# Patient Record
Sex: FEMALE | Race: WHITE | Employment: OTHER | URBAN - METROPOLITAN AREA
[De-identification: names, ages, dates, MRNs, and addresses within clinical notes are randomized per-mention and may not be internally consistent; named-entity substitution may affect disease eponyms.]

---

## 2017-01-09 ENCOUNTER — HOSPITAL ENCOUNTER (OUTPATIENT)
Dept: GENERAL RADIOLOGY | Age: 67
Discharge: HOME | End: 2017-01-09
Payer: MEDICARE

## 2017-01-09 DIAGNOSIS — J90 PLEURAL EFFUSION: ICD-10-CM

## 2017-01-09 PROCEDURE — 71020 XR CHEST PA LAT: CPT

## 2018-03-26 ENCOUNTER — HOSPITAL ENCOUNTER (OUTPATIENT)
Dept: CT IMAGING | Age: 68
Discharge: HOME | End: 2018-03-26
Attending: INTERNAL MEDICINE
Payer: MEDICARE

## 2018-03-26 VITALS — HEIGHT: 65 IN | WEIGHT: 180 LBS | BODY MASS INDEX: 29.99 KG/M2

## 2018-03-26 DIAGNOSIS — Z87.891 HISTORY OF SMOKING 30 OR MORE PACK YEARS: ICD-10-CM

## 2018-03-26 PROCEDURE — G0297 LDCT FOR LUNG CA SCREEN: HCPCS

## 2018-04-09 ENCOUNTER — HOSPITAL ENCOUNTER (OUTPATIENT)
Age: 68
Setting detail: OUTPATIENT SURGERY
Discharge: HOME | End: 2018-04-10
Attending: INTERNAL MEDICINE | Admitting: INTERNAL MEDICINE
Payer: MEDICARE

## 2018-04-09 ENCOUNTER — APPOINTMENT (OUTPATIENT)
Dept: GENERAL RADIOLOGY | Age: 68
End: 2018-04-09
Attending: INTERNAL MEDICINE
Payer: MEDICARE

## 2018-04-09 VITALS
SYSTOLIC BLOOD PRESSURE: 154 MMHG | DIASTOLIC BLOOD PRESSURE: 80 MMHG | TEMPERATURE: 97.8 F | OXYGEN SATURATION: 100 % | WEIGHT: 180 LBS | RESPIRATION RATE: 17 BRPM | HEART RATE: 89 BPM | HEIGHT: 64 IN | BODY MASS INDEX: 30.73 KG/M2

## 2018-04-09 LAB
APPEARANCE FLD: ABNORMAL
BODY FLD TYPE: NORMAL
CHOLEST FLD-MCNC: 151 MG/DL
COLOR FLD: YELLOW
GLUCOSE FLD-MCNC: 6 MG/DL
LDH FLD L TO P-CCNC: 972 U/L
LYMPHOCYTES NFR FLD: 28 %
MONOS+MACROS NFR FLD: 68 %
NEUTROPHILS NFR FLD: 4 %
NUC CELL # FLD: 291 /CU MM
PH FLD: 6.9 [PH]
PROT FLD-MCNC: 6.4 G/DL
RBC # FLD: >100 /CU MM
SPECIMEN SOURCE FLD: ABNORMAL
SPECIMEN SOURCE FLD: NORMAL

## 2018-04-09 PROCEDURE — 87205 SMEAR GRAM STAIN: CPT | Performed by: INTERNAL MEDICINE

## 2018-04-09 PROCEDURE — 82664 ELECTROPHORETIC TEST: CPT | Performed by: INTERNAL MEDICINE

## 2018-04-09 PROCEDURE — 88112 CYTOPATH CELL ENHANCE TECH: CPT | Performed by: INTERNAL MEDICINE

## 2018-04-09 PROCEDURE — 83615 LACTATE (LD) (LDH) ENZYME: CPT | Performed by: INTERNAL MEDICINE

## 2018-04-09 PROCEDURE — 89050 BODY FLUID CELL COUNT: CPT | Performed by: INTERNAL MEDICINE

## 2018-04-09 PROCEDURE — 84157 ASSAY OF PROTEIN OTHER: CPT | Performed by: INTERNAL MEDICINE

## 2018-04-09 PROCEDURE — 88305 TISSUE EXAM BY PATHOLOGIST: CPT | Performed by: INTERNAL MEDICINE

## 2018-04-09 PROCEDURE — 87116 MYCOBACTERIA CULTURE: CPT | Performed by: INTERNAL MEDICINE

## 2018-04-09 PROCEDURE — 84311 SPECTROPHOTOMETRY: CPT | Performed by: INTERNAL MEDICINE

## 2018-04-09 PROCEDURE — 87102 FUNGUS ISOLATION CULTURE: CPT | Performed by: INTERNAL MEDICINE

## 2018-04-09 PROCEDURE — 71045 X-RAY EXAM CHEST 1 VIEW: CPT

## 2018-04-09 PROCEDURE — 82945 GLUCOSE OTHER FLUID: CPT | Performed by: INTERNAL MEDICINE

## 2018-04-09 PROCEDURE — 76040000019: Performed by: INTERNAL MEDICINE

## 2018-04-09 PROCEDURE — 83986 ASSAY PH BODY FLUID NOS: CPT | Performed by: INTERNAL MEDICINE

## 2018-04-09 RX ORDER — FENTANYL CITRATE 50 UG/ML
100 INJECTION, SOLUTION INTRAMUSCULAR; INTRAVENOUS
Status: ACTIVE | OUTPATIENT
Start: 2018-04-09 | End: 2018-04-09

## 2018-04-09 RX ORDER — SODIUM CHLORIDE 0.9 % (FLUSH) 0.9 %
5-10 SYRINGE (ML) INJECTION AS NEEDED
Status: DISCONTINUED | OUTPATIENT
Start: 2018-04-09 | End: 2018-04-11 | Stop reason: HOSPADM

## 2018-04-09 RX ORDER — MIDAZOLAM HYDROCHLORIDE 1 MG/ML
5 INJECTION, SOLUTION INTRAMUSCULAR; INTRAVENOUS
Status: DISCONTINUED | OUTPATIENT
Start: 2018-04-09 | End: 2018-04-11 | Stop reason: HOSPADM

## 2018-04-09 RX ORDER — SODIUM CHLORIDE 0.9 % (FLUSH) 0.9 %
5-10 SYRINGE (ML) INJECTION EVERY 8 HOURS
Status: DISCONTINUED | OUTPATIENT
Start: 2018-04-09 | End: 2018-04-11 | Stop reason: HOSPADM

## 2018-04-09 NOTE — DISCHARGE INSTRUCTIONS
Case Diaz MD  [facility]      Name: Dyan Conley MRN: [id number]   : 1950 Hospital: [address]   Date: 2018  Admit Date: 2018     BRONCHOSCOPY / EBUS & THORACENTESIS DISCHARGE INSTRUCTIONS   Discomfort:  -  Sore throat- throat lozenges or warm salt water gargle  -  Redness at IV site- apply warm compress to area  -  If redness or soreness persists at your IV site - contact your physician  -  Gaseous discomfort- walking, belching will help relieve any discomfort  -  Should not operate a vehicle for at least 12 hours  -  You should not engage in operating any machinery or appliances for rest of today  -  You should not drink alcoholic beverages for at least 12 hours  -  Avoid making any critical decisions for at least 24 hour  -  Blood tinged secretions - this should stop within 2-3 hours    DIET:  -  Nothing by mouth - do not eat or drink for two hours. -  You may resume your regular diet unless otherwise directed    MEDICATIONS:  -  Resume your home medications as outlined below unless otherwise directed. ACTIVITY:  - You may resume your normal daily activities unless otherwise directed  -  It is recommended that you spend the remainder of the day resting.  -  Avoid any strenuous activity. CALL M.D. ANY SIGN OF:  -   Increasing pain, nausea, vomiting  -   New increased bleeding  -   Fever (chills)  -   Pain in chest area  -   Bloody discharge from nose or mouth that persists  -   Shortness of breath  -   Bubbles under the skin or around the collarbone. (These may crackle and pop when you press on them.)  -   Coughing up more than ½ cup of blood. Call [phone] office for the following:  Results of procedure  in  3 days  Additional instructions: If you have not heard the results of your test in 7 days please call the phone number listed above.

## 2018-04-09 NOTE — IP AVS SNAPSHOT
[address] 
[phone] Patient: Alisha Ballesteros MRN: [id number] A check jorgito indicates which time of day the medication should be taken. My Medications CONTINUE taking these medications Instructions Each Dose to Equal  
 Morning Noon Evening Bedtime CENTRUM SILVER PO Your last dose was: Your next dose is: Take 1 Tab by mouth daily. 1 Tab  
    
   
   
   
  
 ibuprofen 600 mg tablet Commonly known as:  MOTRIN Your last dose was: Your next dose is: Take 600 mg by mouth daily. 600 mg  
    
   
   
   
  
 vitamin a-vitamin c-vit e-min tablet Commonly known as:  Alvera Tarrs Your last dose was: Your next dose is: Take 1 Tab by mouth daily. 1 Tab

## 2018-04-09 NOTE — INTERVAL H&P NOTE
H&P Update:  Harsha Bar was seen and examined. History and physical has been reviewed. The patient has been examined.  There have been no significant clinical changes since the completion of the originally dated History and Physical.    Signed By: Lorena Mcdonald MD     April 9, 2018 10:04 AM

## 2018-04-09 NOTE — IP AVS SNAPSHOT
[address] [facility] [address] 
[phone] Patient: Rola Beach MRN: [id number] About your hospitalization You were admitted on:  2018 [facility] You were discharged on:  2018 Why you were hospitalized Your primary diagnosis was:  Not on File Follow-up Information Follow up With Details Comments Contact Info Anuel Pretty MD   [address] 
[phone] Discharge Orders None A check jorgito indicates which time of day the medication should be taken. My Medications CONTINUE taking these medications Instructions Each Dose to Equal  
 Morning Noon Evening Bedtime CENTRUM SILVER PO Your last dose was: Your next dose is: Take 1 Tab by mouth daily. 1 Tab  
    
   
   
   
  
 ibuprofen 600 mg tablet Commonly known as:  MOTRIN Your last dose was: Your next dose is: Take 600 mg by mouth daily. 600 mg  
    
   
   
   
  
 vitamin a-vitamin c-vit e-min tablet Commonly known as:  Abdirizak Shiver Your last dose was: Your next dose is: Take 1 Tab by mouth daily. 1 Tab Discharge Instructions Cheryl Delcid MD 
[facility] Name: Rola Beach MRN: [id number] : 1950 Hospital: [place] [address] Date: 2018  Admit Date: 2018 BRONCHOSCOPY / EBUS & THORACENTESIS DISCHARGE INSTRUCTIONS Discomfort: -  Sore throat- throat lozenges or warm salt water gargle -  Redness at IV site- apply warm compress to area -  If redness or soreness persists at your IV site - contact your physician 
-  Gaseous discomfort- walking, belching will help relieve any discomfort -  Should not operate a vehicle for at least 12 hours -  You should not engage in operating any machinery or appliances for rest of today -  You should not drink alcoholic beverages for at least 12 hours -  Avoid making any critical decisions for at least 24 hour -  Blood tinged secretions  this should stop within 2-3 hours DIET: 
-  Nothing by mouth - do not eat or drink for two hours. -  You may resume your regular diet unless otherwise directed MEDICATIONS: 
-  Resume your home medications as outlined below unless otherwise directed. ACTIVITY: 
- You may resume your normal daily activities unless otherwise directed -  It is recommended that you spend the remainder of the day resting. 
-  Avoid any strenuous activity. CALL M.D. ANY SIGN OF: 
-   Increasing pain, nausea, vomiting 
-   New increased bleeding -   Fever (chills) -   Pain in chest area -   Bloody discharge from nose or mouth that persists -   Shortness of breath -   Bubbles under the skin or around the collarbone. (These may crackle and pop when you press on them.) -   Coughing up more than ½ cup of blood. Call physicians office for the following: 
Results of procedure  in  3 days Additional instructions: If you have not heard the results of your test in 7 days please call the phone number listed above. Introducing [facility] [place] [facility]! Dear Wilhelmenia Mortimer: Thank you for requesting a Rocketmiles account. Our records indicate that you already have an active Rocketmiles account. You can access your account anytime at [email or web address] Did you know that you can access your hospital and ER discharge instructions at any time in Rocketmiles? You can also review all of your test results from your hospital stay or ER visit. Additional Information If you have questions, please visit the Frequently Asked Questions section of the Rocketmiles website at [email or web address] Remember, Rocketmiles is NOT to be used for urgent needs.  For medical emergencies, dial 911. Now available from your iPhone and Android! Introducing Parag Dowd As a [facility] patient, I wanted to make you aware of our electronic visit tool called Parag Dowd. Cat Torrent[employer]/7 allows you to connect within minutes with a medical provider 24 hours a day, seven days a week via a mobile device or tablet or logging into a secure website from your computer. You can access Parag Dowd from anywhere in the [place]. A virtual visit might be right for you when you have a simple condition and feel like you just dont want to get out of bed, or cant get away from work for an appointment, when your regular [facility] provider is not available (evenings, weekends or holidays), or when youre out of town and need minor care. Electronic visits cost only $49 and if the [facility] [employer]/7 provider determines a prescription is needed to treat your condition, one can be electronically transmitted to a nearby pharmacy*. Please take a moment to enroll today if you have not already done so. The enrollment process is free and takes just a few minutes. To enroll, please download the Coferon/Metail neli to your tablet or phone, or visit [email or web address] to enroll on your computer. And, as an [address] [place] [address] patient with a [employer] account, the results of your visits will be scanned into your electronic medical record and your primary care provider will be able to view the scanned results. We urge you to continue to see your regular [facility] provider for your ongoing medical care. And while your primary care provider may not be the one available when you seek a Parag Dowd virtual visit, the peace of mind you get from getting a real diagnosis real time can be priceless. For more information on Parag Dowd, view our Frequently Asked Questions (FAQs) at [email or web address].  
 
Sincerely, 
 
Jocelyn Klein MD 
 Chief Medical Officer Milagro Carrera *:  certain medications cannot be prescribed via Parag Dowd Providers Seen During Your Hospitalization Provider Specialty Primary office phone Delmi Bishop MD Pulmonary Disease [phone] Your Primary Care Physician (PCP) Primary Care Physician Office Phone Office Fax Daphne Christensensang [phone] You are allergic to the following Allergen Reactions Iodine Rash Recent Documentation Height Weight Breastfeeding? BMI OB Status Smoking Status 1.626 m 81.6 kg No 30.9 kg/m2 Postpartum Former Smoker Emergency Contacts Name Discharge Info Relation Home Work Mobile Gopi Hooks DISCHARGE CAREGIVER [3] Spouse [3] [phone] Patient Belongings The following personal items are in your possession at time of discharge: 
  Dental Appliances: None  Visual Aid: None Please provide this summary of care documentation to your next provider. Signatures-by signing, you are acknowledging that this After Visit Summary has been reviewed with you and you have received a copy. Patient Signature:  ____________________________________________________________ Date:  ____________________________________________________________  
  
Areli Knight Provider Signature:  ____________________________________________________________ Date:  ____________________________________________________________

## 2018-04-09 NOTE — H&P
Avila Radha  3/30/2018 8:15 AM  Location: [facility]  Patient #: [id number]  : 1950   / Language: Amarjit Penerendira / Race: White  Female      History of Present Illness Robin Villa; 3/30/2018 9:39 AM)  The patient is a 79year old female who presents with abnormal chest imaging. The patient presents for follow-up. Additional reasons for visit:    Pleural effusion is described as the following:  Reason for encounter: follow-up. Note: Patient here for routine follow-up of pleural effusion    Regarding pleural effusion on the left side: On physical exam you can hear a rub on that side -  she is asymptomatic.  Pleural fluid by report has been reassuring.   fluid is  significantly worse.  .  Cytology reviewed it was exudative.  I offered continued observation versus thoracentesis with a pulse of steroids or thoracic surgical evaluation.  She opts for option #2 so we will set her up for an ultrasound-guided thoracentesis by myself next week with fluid to be sent again.  If it recurs a 3rd-4th time and we would ask thoracic surgical for pleuroscopy.  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 Regarding smoking: She gave up smoking in October and smoked for approximately 40 years 1 pack per day.  Explained risks, benefits and alternatives to lung cancer screening unique to her having rheumatoid arthritis, methotrexate therapy and a pleural effusion. I answered all questions asked of me to apparent satisfaction.  Last CT nonrevealing  2018 with the exception of increasing left effusion.  serial CT monitoring   To continue    Problem List/Past Medical (Ronit Noonan; 3/30/2018 9:04 AM)  REFUSED INFLUENZA VACCINE (Z28.21)    RA (RHEUMATOID ARTHRITIS) (M06.9)   taken off immunologics until pleural disease is sorted out.   PLEURAL EFFUSION, LEFT (J90)   2016: IMPRESSION: Trace left effusion with minimal atelectasis. Areas of scarring right lung. Otherwise unremarkable. HISTORY OF SMOKING 30 OR MORE PACK YEARS (R94.090)    PRIOR METHOTREXATE THERAPY (Z92.21)      Other Problems (Sunday Arguello; 3/30/2018 9:04 AM)  Patient has Advanced Medical Directive    Abnormal chest x-ray    ACUTE BRONCHITIS (J20.9)   Pt seems better since she has had a course of levofloxacin. Allergies (Sunday Arguello; 3/30/2018 9:04 AM)  Iodinated Contrast      Family History (Sunday Arguello; 3/30/2018 9:04 AM)  Other medical problems   Paternal Grandmother. Cancer   Father. Arthritis   Father. Completed Stroke   Father. Social History (Sunday Arguello; 3/30/2018 9:04 AM)  Tobacco use   Former smoker. Past Illicit drug use   Previously used marijuana  Alcohol use   Moderate alcohol use. Exercise   5 x week. Marital status   . SMOKER (Z72.0)   Current some day smoker. Smokes < 1 pack of cigarettes per day . Current Household Members   . Current Occupation   Retired. Medication History (Sunday Arguello; 2/46/9820 5:17 AM)  Folic Acid  (1MG Tablet, one Oral daily) Active. Symphony Double Pumping System  Active. Methotrexate Sodium  (5 Oral weekly) Specific strength unknown - Active. Ibuprofen  (600MG Tablet, Oral) Active. Multivitamin Adult  (Oral) Active. Medications Reconciled     Past Surgical History (Sunday Arguello; 3/30/2018 9:04 AM)  Appendectomy          Review of Systems (Zach Serrano; 03/27/2018 6:04 PM)  General Not Present- Fever, Food allergies, Medication allergies, Night Sweats, Seasonal allergies, Weight Gain and Weight Loss. Skin Not Present- Cyanosis, Jaundice and Rash. HEENT Not Present- Blurred Vision, Deafness, Difficulty swallowing, Double Vision, Ear Discharge, Ear Pain, Eye discharge, Hoarseness, Nasal obstruction, Nose Bleed, Ringing in the Ears, Sinusitis, Sore Throat and Wears glasses/contact lenses.   Respiratory Not Present- Cough, Coughing blood, Daytime sleepiness, Shortness of breath, Snoring and Wheezing. Cardiovascular Not Present- Chest pain, Palpitations and Swelling of Extremities. Gastrointestinal Not Present- Abdominal Pain, Constipation, Diarrhea, Indigestion, Nausea and Vomiting. Female Genitourinary Present- Urinating at Night. Not Present- Blood in Urine, Frequency, Incontinence and Painful Urination. Musculoskeletal Not Present- Joint Pain, Joint Stiffness and Joint Swelling. Neurological Not Present- Headaches, Numbness and Weakness. Psychiatric Not Present- Anxiety, Depression and Hallucinations. Endocrine Not Present- Cold Intolerance, Excessive Thirst and Heat Intolerance. Hematology Not Present- Abnormal Bleeding, Easy Bruising and Swollen glands. Vitals (Moise Grass; 3/30/2018 9:08 AM)  3/30/2018 9:07 AM  Weight: 184 lb   Height: 64.5 in   Body Surface Area: 1.9 m²   Body Mass Index: 31.1 kg/m²    Temp.: 98.3° F    Pulse: 95 (Regular)    Resp. : 14 (Unlabored)    P. OX: 98% (Room air)  BP: 145/85 (Sitting, Left Arm, Standard)              Physical Exam Barney Dy; 3/30/2018 9:39 AM)  Chest and Lung Exam  Constitutional - alert, cooperative, well nourished and no acute distress. Skin - normal moisture and normal warmth. Head and Neck - normocephalic, atraumatic, trachea midline and thyroid normal.  Eyes - pupils equal round and reactive to light, conjunctiva normal and lids normal.  ENMT - external ears have normal appearance, external nose is normal, oral mucosa is moist and oropharynx clear. Chest and Lungs  Auscultation - rales (rub left side), no wheezes and no rhonchi. Palpation - chest wall non-tender. Inspection - normal shape and normal respiratory effort. Cardiovascular  Auscultation - no heart murmurs and rate regular. Inspection - jugular veins nondistended and no lower extremity edema. Abdomen - normal contour and soft. Peripheral Vascular - normal gait and no digital clubbing.   Neuropsychiatric - oriented x 3, appropriate mood and affect and good insight. Lymphatic - no neck lymphadenopathy and no supraclavicular lymphadenopathy. Neurological - extraocular muscles intact and no focal deficits present. DATA - CT scan image and report reviewed. Assessment & Plan Jose Manuel Elmore; 3/30/2018 9:35 AM)  REFUSED INFLUENZA VACCINE (Z28.21)  Current Plans  INFLUENZA IMMUNIZATION WAS NOT ORDERED OR ADMINISTERED FOR REASONS DOCUMENTED BY CLINICIAN ()  PRIOR METHOTREXATE THERAPY (Z92.21)  RA (RHEUMATOID ARTHRITIS) (M06.9) <HCC40>  Story: taken off immunologics until pleural disease is sorted out. HISTORY OF SMOKING 30 OR MORE PACK YEARS (I67.685)  Story: Quit October 2016-greater than 30-pack-year history of smoking    March 2018 IMPRESSION: Benign-appearing lung nodules. No significant change. Limited by  pleural effusion. Current Plans  LOW DOSE CT LUNG SCREENING (92148) (March 2019)  Lung Cancer Screening CT:    1. Age 50-69 years old  2. No signs or symptoms of active lung carcinoma  3. 30 pack year history of smoking (or greater than 30 pack years)  4. Current smoker or quit smoking within the last 15 years  11. Written order from health professional following lung cancer screening counseling that attests to shared decision-making having taken place before their 1st screening CT     I attest the patient meets the above criteria    PLEURAL EFFUSION, LEFT (T34)  Story: March 2018-increasing pleural effusion on the left hand side    December 2016: IMPRESSION: Trace left effusion with minimal atelectasis. Areas of scarring  right lung. Otherwise unremarkable. Current Plans  Pt Education - How to access health information online: discussed with patient and provided information.   THORACENTESIS WITH ULTRASOUND (27668) (left-sided thoracentesis)  Follow up in 3 months or sooner, as needed  Patient to follow up with me  Started PredniSONE 10MG, 1 (one) Tablet as directed, #40, 16 days starting 03/30/2018, No Refill. Local Order: - 40 mg x 4 days 30 mg x 4 days 20 mg x 4 days 10 mg x 4 days  Note: Dr. Abilio Tsai available for immediate supervision.     Medical Decision Making Laurent Alexander; 3/30/2018 9:39 AM)  Amount/complexity of data to be reviewed:  - Order and/or review of radiology test(s)          Signed by Laurent Alexander (3/30/2018 9:40 AM)

## 2018-04-09 NOTE — PROCEDURES
Name: Ce Yu MRN: [id number]   : 1950 Hospital: [address]   Date: 2018  Admit Date: 2018       Procedure:  Left  THORACENTESIS with ultrasound guidance & with catheter    Indication:  Left Pleural effusion    Summary:  Informed consent was obtained and timeout taken. Operative site was prepped and draped in usual fashion for a thoracentesis using aseptic technique. The intercostal space was anesthetized with 1% Lidocaine to achieve adequate anesthesia. A thoracentesis kit was used to perform the procedure. Using the previously marked location as a guide,1% plain lidocaine was infiltrated into the skin and subcutaneous tissue, as well as onto the underlying rib and inter-costal muscles & pleural fluid was aspirated to assure proper location prior to removing the anesthesia needle. An incision was then made, with the supplied scalpel in the usual fashion to facilitate the insertion of the thoracentesis needle. The needle and thoracentesis catheter were inserted over the rib into the pleural space using the needle itself as a trocar. The needle was then removed and the catheter inserted was attached to the supplied tubing  & drainage bag without complication to drain pleural fluid. Approximately 500 mL of turbid MILKY YELLOW ( see picture below  ) fluid was obatined with ease and sent for protein, LDH, glucose, cell count with differential, Gram stain, culture and sensitivity, anaerobic culture, AFB smear and culture, fungal smear and culture, chylomicrons  and cholesterol  and cytology. The procedure was terminated after pleural drainage stopped. Air was aspirated during the procedure & estimated blood loss was Minimal.   A post procedure chest Xray is pending. Results discussed with patient and spouse. Will need surgical Evaluation as outpatient for possible VATS    There was an opportunity for questions and answers.    I answered all questions asked of me to apparent satisfaction. Complications:  None; patient tolerated the procedure well.     Estimated Blood Loss: None    Condition: stable    Pictures:                    Signed By: Leonor Beach MD

## 2018-04-12 LAB
ADENOSINE DEAMINASE PLR-CCNC: 6.6 U/L (ref 0–9.4)
SPECIMEN SOURCE: NORMAL

## 2018-04-13 LAB
BACTERIA SPEC CULT: NORMAL
CHYLOMICRON SCREEN, CHYLMT: NORMAL
GRAM STN SPEC: NORMAL
SERVICE CMNT-IMP: NORMAL
SPECIMEN SOURCE: NORMAL

## 2018-04-17 ENCOUNTER — OFFICE VISIT (OUTPATIENT)
Dept: SURGERY | Age: 68
End: 2018-04-17

## 2018-04-17 ENCOUNTER — HOSPITAL ENCOUNTER (OUTPATIENT)
Dept: MAMMOGRAPHY | Age: 68
Discharge: HOME | End: 2018-04-17
Attending: FAMILY MEDICINE
Payer: MEDICARE

## 2018-04-17 VITALS
WEIGHT: 180 LBS | TEMPERATURE: 98.7 F | HEIGHT: 64 IN | BODY MASS INDEX: 30.73 KG/M2 | DIASTOLIC BLOOD PRESSURE: 82 MMHG | RESPIRATION RATE: 20 BRPM | OXYGEN SATURATION: 98 % | SYSTOLIC BLOOD PRESSURE: 133 MMHG | HEART RATE: 99 BPM

## 2018-04-17 DIAGNOSIS — M06.9 RHEUMATOID ARTHRITIS INVOLVING MULTIPLE SITES, UNSPECIFIED RHEUMATOID FACTOR PRESENCE: ICD-10-CM

## 2018-04-17 DIAGNOSIS — Z12.31 VISIT FOR SCREENING MAMMOGRAM: ICD-10-CM

## 2018-04-17 DIAGNOSIS — J90 EXUDATIVE PLEURAL EFFUSION: Primary | ICD-10-CM

## 2018-04-17 PROCEDURE — 77067 SCR MAMMO BI INCL CAD: CPT

## 2018-04-17 RX ORDER — LEUCOVORIN CALCIUM 5 MG/1
5 TABLET ORAL
Refills: 6 | COMMUNITY
Start: 2018-03-28

## 2018-04-17 RX ORDER — METHOTREXATE 2.5 MG/1
17.5 TABLET ORAL
Refills: 3 | COMMUNITY
Start: 2018-02-09

## 2018-04-17 RX ORDER — PREDNISONE 10 MG/1
10 TABLET ORAL
Refills: 0 | Status: ON HOLD | COMMUNITY
Start: 2018-04-04 | End: 2022-02-08 | Stop reason: SDUPTHER

## 2018-04-17 NOTE — PROGRESS NOTES
Asked to see Mrs. Hooks re: recurrent pleural effusion    Referred by Dr. Camila Ortiz    Mrs. Howard Lucas is a pleasant 80 y/o lady with a past medical history significant for rheumatoid arthritis who was noted to have an incidental pleural effusion on her annual screening LDCT. She was seen by Dr. Antione Hoffman of pulmonary medicine. She had a thoracentesis which revealed a cloudy, exudative effusion. She reports that she is asymptomatic; no dyspnea, no fevers no chest pain. Allergies: Iodine    PMHx: rheumatoid arthritis    PSHx: appendectomy, breast biopsy    SocHx: quit smoking in 2016    FamHx: no history of pulmonary disorders    Meds:  leucovorin calcium (WELLCOVORIN) 5 mg tablet       methotrexate (RHEUMATREX) 2.5 mg tablet      predniSONE (DELTASONE) 10 mg tablet      ibuprofen (MOTRIN) 600 mg tablet      FOLIC ACID/MULTIVIT-MIN/LUTEIN (CENTRUM SILVER PO)      vitamin a-vitamin c-vit e-min (OCUVITE) tablet        ROS:    Constitutional- denies weight loss or gain  Neuro- denies syncope  Optho- no changes in vision  HEENT- denies dysphagia  Resp- denies dyspnea  CV- denies angina  GI- denies abd pain  - no complaints  ID- denies recent fevers  Vasc- denies claudication    Afebrile  P 90  /80    On exam she is seated upright  Alert and oriented  Appears younger than her stated age  Well developed  Normal speech, normal affect  No cervical or supraclavicular lymphadenopathy  Lungs CTA b/l  RRR, no murmurs  Radial pulses palp b/l  Abd SNTND, no organomegaly  LE non edematous    =======================    Pleural Fluid- pH 6.9  Protein 6.4  Glucose 6 neutrophils 4    No chylomicrons    Gram stain neg  AFB neg  Fungal neg    Cytology neg  ==========================    I have personally reviewed her LDCT. She had a moderate left pleural effusion.   No definite pleural thickening or nodules.    =======================    Diagnoses  1- Exudative left pleural effusion    =====================    Mrs. Tremayne Gustafson as an asymptomatic left pleural effusion which is exudative. It does not appear to be an empyema and it does not appear to be a malignant effusion. It could be related to her rheumatoid arthritis. The next step in working this up would be a pleural biopsy. I discussed a possible VATS with her for a pleural biopsy and I discussed long term management of the effusion should it keep coming back or start to bother her. Right now she is not interested in pursuing a pleural biopsy. She states that she has no symptoms and is \"pretty sure its my arthritis\". She plans to have her RA regimen increased. Mrs. Tremayne Gustafson understands the risks associated with not aggressively pursuing a biopsy and is comfortable with that. She will reconsider if the effusion returns or if she becomes dyspneic. Thank you for allowing us to care for Mrs. Tremayne Gustafson. I spent 45 minutes with Mrs. Hooks, greater than 50% of which was spent in counseling and education re: pleural effusions, pleural biopsies and surgery etc.

## 2018-04-17 NOTE — PROGRESS NOTES
1. Have you been to the ER, urgent care clinic since your last visit? Hospitalized since your last visit? No    2. Have you seen or consulted any other health care providers outside of the [address] [place] [address] since your last visit? Include any pap smears or colon screening.  No

## 2018-05-07 LAB
BACTERIA SPEC CULT: NORMAL
SERVICE CMNT-IMP: NORMAL

## 2018-05-23 LAB
ACID FAST STN SPEC: NEGATIVE
MYCOBACTERIUM SPEC QL CULT: NEGATIVE
SPECIMEN PREPARATION: NORMAL
SPECIMEN SOURCE: NORMAL

## 2019-08-08 ENCOUNTER — HOSPITAL ENCOUNTER (OUTPATIENT)
Dept: CT IMAGING | Age: 69
Discharge: HOME | End: 2019-08-08
Attending: INTERNAL MEDICINE
Payer: MEDICARE

## 2019-08-08 VITALS — WEIGHT: 185 LBS | BODY MASS INDEX: 30.82 KG/M2 | HEIGHT: 65 IN

## 2019-08-08 DIAGNOSIS — Z87.891 PERSONAL HISTORY OF TOBACCO USE, PRESENTING HAZARDS TO HEALTH: ICD-10-CM

## 2019-08-08 PROCEDURE — G0297 LDCT FOR LUNG CA SCREEN: HCPCS

## 2020-02-11 ENCOUNTER — HOSPITAL ENCOUNTER (OUTPATIENT)
Dept: CT IMAGING | Age: 70
Discharge: HOME | End: 2020-02-11
Attending: INTERNAL MEDICINE
Payer: MEDICARE

## 2020-02-11 VITALS — HEIGHT: 65 IN | WEIGHT: 189 LBS | BODY MASS INDEX: 31.49 KG/M2

## 2020-02-11 DIAGNOSIS — Z87.891 PERSONAL HISTORY OF NICOTINE DEPENDENCE: ICD-10-CM

## 2020-02-11 PROCEDURE — G0297 LDCT FOR LUNG CA SCREEN: HCPCS

## 2022-01-27 ENCOUNTER — TRANSCRIBE ORDER (OUTPATIENT)
Dept: SCHEDULING | Age: 72
End: 2022-01-27

## 2022-01-27 DIAGNOSIS — Z87.891 PERSONAL HISTORY OF TOBACCO USE, PRESENTING HAZARDS TO HEALTH: Primary | ICD-10-CM

## 2022-01-28 ENCOUNTER — TRANSCRIBE ORDER (OUTPATIENT)
Dept: SCHEDULING | Age: 72
End: 2022-01-28

## 2022-01-28 DIAGNOSIS — Z87.891 PERSONAL HISTORY OF TOBACCO USE, PRESENTING HAZARDS TO HEALTH: Primary | ICD-10-CM

## 2022-02-02 ENCOUNTER — APPOINTMENT (OUTPATIENT)
Dept: CT IMAGING | Age: 72
DRG: 196 | End: 2022-02-02
Attending: INTERNAL MEDICINE
Payer: MEDICARE

## 2022-02-02 ENCOUNTER — HOSPITAL ENCOUNTER (INPATIENT)
Age: 72
LOS: 6 days | Discharge: HOME | DRG: 196 | End: 2022-02-08
Attending: EMERGENCY MEDICINE | Admitting: INTERNAL MEDICINE
Payer: MEDICARE

## 2022-02-02 ENCOUNTER — APPOINTMENT (OUTPATIENT)
Dept: GENERAL RADIOLOGY | Age: 72
DRG: 196 | End: 2022-02-02
Attending: EMERGENCY MEDICINE
Payer: MEDICARE

## 2022-02-02 DIAGNOSIS — R09.02 HYPOXIA: ICD-10-CM

## 2022-02-02 DIAGNOSIS — J18.9 PNEUMONIA OF BOTH LUNGS DUE TO INFECTIOUS ORGANISM, UNSPECIFIED PART OF LUNG: Primary | ICD-10-CM

## 2022-02-02 PROBLEM — J96.90 RESPIRATORY FAILURE (HCC): Status: ACTIVE | Noted: 2022-02-02

## 2022-02-02 LAB
ALBUMIN SERPL-MCNC: 2.5 G/DL (ref 3.5–5)
ALBUMIN/GLOB SERPL: 0.6 {RATIO} (ref 1.1–2.2)
ALP SERPL-CCNC: 66 U/L (ref 45–117)
ALT SERPL-CCNC: 29 U/L (ref 12–78)
ANION GAP SERPL CALC-SCNC: 6 MMOL/L (ref 5–15)
ARTERIAL PATENCY WRIST A: YES
AST SERPL-CCNC: 41 U/L (ref 15–37)
ATRIAL RATE: 101 BPM
BASE EXCESS BLDA CALC-SCNC: 0.8 MMOL/L
BASOPHILS # BLD: 0.1 K/UL (ref 0–0.1)
BASOPHILS NFR BLD: 1 % (ref 0–1)
BDY SITE: NORMAL
BILIRUB SERPL-MCNC: 0.5 MG/DL (ref 0.2–1)
BNP SERPL-MCNC: 245 PG/ML
BUN SERPL-MCNC: 13 MG/DL (ref 6–20)
BUN/CREAT SERPL: 21 (ref 12–20)
CALCIUM SERPL-MCNC: 8.8 MG/DL (ref 8.5–10.1)
CALCULATED P AXIS, ECG09: 42 DEGREES
CALCULATED R AXIS, ECG10: -42 DEGREES
CALCULATED T AXIS, ECG11: 41 DEGREES
CHLORIDE SERPL-SCNC: 96 MMOL/L (ref 97–108)
CO2 SERPL-SCNC: 27 MMOL/L (ref 21–32)
COMMENT, HOLDF: NORMAL
COMMENT, HOLDF: NORMAL
CREAT SERPL-MCNC: 0.61 MG/DL (ref 0.55–1.02)
D DIMER PPP FEU-MCNC: 1.35 MG/L FEU (ref 0–0.65)
DIAGNOSIS, 93000: NORMAL
DIFFERENTIAL METHOD BLD: ABNORMAL
EOSINOPHIL # BLD: 0.2 K/UL (ref 0–0.4)
EOSINOPHIL NFR BLD: 3 % (ref 0–7)
ERYTHROCYTE [DISTWIDTH] IN BLOOD BY AUTOMATED COUNT: 12.2 % (ref 11.5–14.5)
FLUAV RNA SPEC QL NAA+PROBE: NOT DETECTED
FLUBV RNA SPEC QL NAA+PROBE: NOT DETECTED
GAS FLOW.O2 O2 DELIVERY SYS: 4 L/MIN
GLOBULIN SER CALC-MCNC: 4.1 G/DL (ref 2–4)
GLUCOSE SERPL-MCNC: 101 MG/DL (ref 65–100)
HCO3 BLDA-SCNC: 25 MMOL/L (ref 22–26)
HCT VFR BLD AUTO: 38.5 % (ref 35–47)
HGB BLD-MCNC: 13.1 G/DL (ref 11.5–16)
IMM GRANULOCYTES # BLD AUTO: 0 K/UL (ref 0–0.04)
IMM GRANULOCYTES NFR BLD AUTO: 0 % (ref 0–0.5)
LYMPHOCYTES # BLD: 1.5 K/UL (ref 0.8–3.5)
LYMPHOCYTES NFR BLD: 21 % (ref 12–49)
MCH RBC QN AUTO: 33.2 PG (ref 26–34)
MCHC RBC AUTO-ENTMCNC: 34 G/DL (ref 30–36.5)
MCV RBC AUTO: 97.5 FL (ref 80–99)
MONOCYTES # BLD: 0.7 K/UL (ref 0–1)
MONOCYTES NFR BLD: 10 % (ref 5–13)
NEUTS SEG # BLD: 4.6 K/UL (ref 1.8–8)
NEUTS SEG NFR BLD: 65 % (ref 32–75)
NRBC # BLD: 0 K/UL (ref 0–0.01)
NRBC BLD-RTO: 0 PER 100 WBC
OSMOLALITY SERPL: 276 MOSM/KG H2O
P-R INTERVAL, ECG05: 156 MS
PCO2 BLDA: 40 MMHG (ref 35–45)
PH BLDA: 7.42 [PH] (ref 7.35–7.45)
PLATELET # BLD AUTO: 327 K/UL (ref 150–400)
PMV BLD AUTO: 8.8 FL (ref 8.9–12.9)
PO2 BLDA: 82 MMHG (ref 80–100)
POTASSIUM SERPL-SCNC: 3.7 MMOL/L (ref 3.5–5.1)
PROCALCITONIN SERPL-MCNC: <0.05 NG/ML
PROT SERPL-MCNC: 6.6 G/DL (ref 6.4–8.2)
Q-T INTERVAL, ECG07: 328 MS
QRS DURATION, ECG06: 88 MS
QTC CALCULATION (BEZET), ECG08: 425 MS
RBC # BLD AUTO: 3.95 M/UL (ref 3.8–5.2)
SAMPLES BEING HELD,HOLD: NORMAL
SAMPLES BEING HELD,HOLD: NORMAL
SAO2 % BLD: 96 % (ref 92–97)
SAO2% DEVICE SAO2% SENSOR NAME: NORMAL
SARS-COV-2, COV2: NORMAL
SARS-COV-2, COV2: NOT DETECTED
SODIUM SERPL-SCNC: 129 MMOL/L (ref 136–145)
SPECIMEN SITE: NORMAL
UR CULT HOLD, URHOLD: NORMAL
VENTRICULAR RATE, ECG03: 101 BPM
WBC # BLD AUTO: 7.1 K/UL (ref 3.6–11)

## 2022-02-02 PROCEDURE — 74011636637 HC RX REV CODE- 636/637: Performed by: INTERNAL MEDICINE

## 2022-02-02 PROCEDURE — 74011000636 HC RX REV CODE- 636: Performed by: RADIOLOGY

## 2022-02-02 PROCEDURE — 99285 EMERGENCY DEPT VISIT HI MDM: CPT

## 2022-02-02 PROCEDURE — 74011250636 HC RX REV CODE- 250/636: Performed by: INTERNAL MEDICINE

## 2022-02-02 PROCEDURE — 93005 ELECTROCARDIOGRAM TRACING: CPT

## 2022-02-02 PROCEDURE — 85025 COMPLETE CBC W/AUTO DIFF WBC: CPT

## 2022-02-02 PROCEDURE — 36415 COLL VENOUS BLD VENIPUNCTURE: CPT

## 2022-02-02 PROCEDURE — 74011000250 HC RX REV CODE- 250: Performed by: INTERNAL MEDICINE

## 2022-02-02 PROCEDURE — 71275 CT ANGIOGRAPHY CHEST: CPT

## 2022-02-02 PROCEDURE — 84145 PROCALCITONIN (PCT): CPT

## 2022-02-02 PROCEDURE — 87636 SARSCOV2 & INF A&B AMP PRB: CPT

## 2022-02-02 PROCEDURE — 82803 BLOOD GASES ANY COMBINATION: CPT

## 2022-02-02 PROCEDURE — 71046 X-RAY EXAM CHEST 2 VIEWS: CPT

## 2022-02-02 PROCEDURE — 65660000000 HC RM CCU STEPDOWN

## 2022-02-02 PROCEDURE — 36600 WITHDRAWAL OF ARTERIAL BLOOD: CPT

## 2022-02-02 PROCEDURE — 85379 FIBRIN DEGRADATION QUANT: CPT

## 2022-02-02 PROCEDURE — 83880 ASSAY OF NATRIURETIC PEPTIDE: CPT

## 2022-02-02 PROCEDURE — 83930 ASSAY OF BLOOD OSMOLALITY: CPT

## 2022-02-02 PROCEDURE — 80053 COMPREHEN METABOLIC PANEL: CPT

## 2022-02-02 RX ORDER — POLYETHYLENE GLYCOL 3350 17 G/17G
17 POWDER, FOR SOLUTION ORAL DAILY PRN
Status: DISCONTINUED | OUTPATIENT
Start: 2022-02-02 | End: 2022-02-08 | Stop reason: HOSPADM

## 2022-02-02 RX ORDER — TELMISARTAN 80 MG/1
80 TABLET ORAL DAILY
COMMUNITY
Start: 2021-08-02

## 2022-02-02 RX ORDER — ENOXAPARIN SODIUM 100 MG/ML
40 INJECTION SUBCUTANEOUS EVERY 24 HOURS
Status: DISCONTINUED | OUTPATIENT
Start: 2022-02-02 | End: 2022-02-08 | Stop reason: HOSPADM

## 2022-02-02 RX ORDER — ONDANSETRON 2 MG/ML
4 INJECTION INTRAMUSCULAR; INTRAVENOUS
Status: DISCONTINUED | OUTPATIENT
Start: 2022-02-02 | End: 2022-02-08 | Stop reason: HOSPADM

## 2022-02-02 RX ORDER — DIPHENHYDRAMINE HYDROCHLORIDE 50 MG/ML
50 INJECTION, SOLUTION INTRAMUSCULAR; INTRAVENOUS ONCE
Status: COMPLETED | OUTPATIENT
Start: 2022-02-02 | End: 2022-02-02

## 2022-02-02 RX ORDER — PREDNISONE 20 MG/1
40 TABLET ORAL ONCE
Status: COMPLETED | OUTPATIENT
Start: 2022-02-02 | End: 2022-02-02

## 2022-02-02 RX ORDER — SODIUM CHLORIDE 0.9 % (FLUSH) 0.9 %
5-40 SYRINGE (ML) INJECTION AS NEEDED
Status: DISCONTINUED | OUTPATIENT
Start: 2022-02-02 | End: 2022-02-08 | Stop reason: HOSPADM

## 2022-02-02 RX ORDER — SODIUM CHLORIDE 0.9 % (FLUSH) 0.9 %
5-40 SYRINGE (ML) INJECTION EVERY 8 HOURS
Status: DISCONTINUED | OUTPATIENT
Start: 2022-02-02 | End: 2022-02-08 | Stop reason: HOSPADM

## 2022-02-02 RX ORDER — ACETAMINOPHEN 325 MG/1
650 TABLET ORAL
Status: DISCONTINUED | OUTPATIENT
Start: 2022-02-02 | End: 2022-02-08 | Stop reason: HOSPADM

## 2022-02-02 RX ORDER — FUROSEMIDE 10 MG/ML
40 INJECTION INTRAMUSCULAR; INTRAVENOUS ONCE
Status: COMPLETED | OUTPATIENT
Start: 2022-02-02 | End: 2022-02-02

## 2022-02-02 RX ORDER — ACETAMINOPHEN 650 MG/1
650 SUPPOSITORY RECTAL
Status: DISCONTINUED | OUTPATIENT
Start: 2022-02-02 | End: 2022-02-08 | Stop reason: HOSPADM

## 2022-02-02 RX ORDER — ONDANSETRON 4 MG/1
4 TABLET, ORALLY DISINTEGRATING ORAL
Status: DISCONTINUED | OUTPATIENT
Start: 2022-02-02 | End: 2022-02-08 | Stop reason: HOSPADM

## 2022-02-02 RX ADMIN — SODIUM CHLORIDE, PRESERVATIVE FREE 10 ML: 5 INJECTION INTRAVENOUS at 16:09

## 2022-02-02 RX ADMIN — FUROSEMIDE 40 MG: 10 INJECTION, SOLUTION INTRAVENOUS at 17:25

## 2022-02-02 RX ADMIN — IOPAMIDOL 80 ML: 755 INJECTION, SOLUTION INTRAVENOUS at 18:43

## 2022-02-02 RX ADMIN — PREDNISONE 40 MG: 20 TABLET ORAL at 17:25

## 2022-02-02 RX ADMIN — DIPHENHYDRAMINE HYDROCHLORIDE 50 MG: 50 INJECTION INTRAMUSCULAR; INTRAVENOUS at 17:27

## 2022-02-02 NOTE — H&P
[address] [facility] Adult  Hospitalist Group  History and Physical    Date of Service:  2/2/2022  Primary Care Provider: Ashley Davalos MD  Source of information: The patient    Chief Complaint: Shortness of Breath      History of Presenting Illness:   Rita Dexter is a 70 y.o. female with pmh of HTN, Rheumatoid arthritis on MTX, and immunosuppresants presents with 3 days of worsening SOB. Patient was seen by her primary care physician today for complaints of shortness of breath, was noted to be hypoxic to the 80s on room air and sent to the emergency department for further evaluation, and concern for COVID-19. Patient has been vaccinated x3, however notably is immunocompromised with rheumatoid arthritis. Patient denies any fevers at home, she notes that she has been weak, increase shortness of breath. She is unable to barely walk up steps or even walk a few blocks without having to stop and catch her breath. She denied any lower extremity edema, para nocturnal dyspnea, orthopnea. But she does use 2 pillows to sleep at night, however is always done so. She does have a dry non productive cough. Denies any palpitations, chest pain, nausea vomiting or diarrhea. No musculoskeletal pain. Of note she does have a h/o pleural effusion which had to be drained multiple times (and cause was uncertain), she has since always had a \"tiny small one\" which never went away. On arrival to the emergency department patient was found to be desaturating to the 80s on room air, placed on 2 L with improvement. Chest x-ray with bilateral infiltrates. COVID-19 PCR done and negative. Patient referred for admission to the hospitalist.     REVIEW OF SYSTEMS:  A comprehensive review of systems was negative except for that written in the History of Present Illness.      Past Medical History:   Diagnosis Date    HLD (hyperlipidemia)     HTN (hypertension)     Rheumatoid arthritis ([address]       Past Surgical History: Procedure Laterality Date    HX APPENDECTOMY      HX BREAST BIOPSY Left     Surgical BX (Over 20yrs ago) - BENIGN     HX OTHER SURGICAL      Breast Biopsy     Prior to Admission medications    Medication Sig Start Date End Date Taking? Authorizing Provider   leucovorin calcium (WELLCOVORIN) 5 mg tablet TK 1  T  PO  FOR  3  DAYS  BEGINNING  THE  DAY  AFTER  METHOTREXATE  DOSE 3/28/18   Provider, Historical   methotrexate (RHEUMATREX) 2.5 mg tablet TK  5  TS  PO  Q  WEEK  ON  THE  SAME  DAY 2/9/18   Provider, Historical   predniSONE (DELTASONE) 10 mg tablet  4/4/18   Provider, Historical   ibuprofen (MOTRIN) 600 mg tablet Take 600 mg by mouth daily. Provider, Historical   FOLIC ACID/MULTIVIT-MIN/LUTEIN (CENTRUM SILVER PO) Take 1 Tab by mouth daily. Provider, Historical   vitamin a-vitamin c-vit e-min (OCUVITE) tablet Take 1 Tab by mouth daily. Provider, Historical     Allergies   Allergen Reactions    Iodine Rash      FH: sister with graves disease  Social History:  reports that she quit smoking about 5 years ago. Her smoking use included cigarettes. She has a 40.00 pack-year smoking history. She has never used smokeless tobacco. She reports current alcohol use. She reports that she does not use drugs. Family and social history were personally reviewed, all pertinent and relevant details are outlined as above. Objective:     Visit Vitals  BP (!) 168/114   Pulse (!) 111   Temp 99.8 °F (37.7 °C)   Resp 20   Ht 5' 5\" (1.651 m)   Wt 90.7 kg (200 lb)   SpO2 94%   BMI 33.28 kg/m²    O2 Flow Rate (L/min): 4 l/min O2 Device: Nasal cannula    PHYSICAL EXAM:   General: Alert x oriented x 3, awake, mild respiratory distress, resting in bed, pleasant female, appears to be stated age  HEENT: PEERL, EOMI, moist mucus membranes  Neck: + JVD,   Chest: Crackles bilaterally in all lung fields  CVS: Regular, tachycardic, no murmurs/rubs/gallops.   Abd: Soft, non-tender, non-distended, +bowel sounds   Ext: No clubbing, no cyanosis, 1+ edema right greater than left  Neuro/Psych: Pleasant mood and affect, CN 2-12 grossly intact, sensory grossly within normal limit,   Cap refill: Brisk, less than 3 seconds  Pulses: 2+, symmetric in all extremities  Skin: Warm, dry, without rashes or lesions    Data Review: All diagnostic labs and studies have been reviewed. Abnormal Labs Reviewed   METABOLIC PANEL, COMPREHENSIVE - Abnormal; Notable for the following components:       Result Value    Sodium 129 (*)     Chloride 96 (*)     Glucose 101 (*)     BUN/Creatinine ratio 21 (*)     AST (SGOT) 41 (*)     Albumin 2.5 (*)     Globulin 4.1 (*)     A-G Ratio 0.6 (*)     All other components within normal limits   CBC WITH AUTOMATED DIFF - Abnormal; Notable for the following components:    MPV 8.8 (*)     All other components within normal limits       All Micro Results     Procedure Component Value Units Date/Time    COVID-19 WITH INFLUENZA A/B [[id number]] Collected: 02/02/22 1358    Order Status: Completed Specimen: Nasopharyngeal Updated: 02/02/22 1436     SARS-CoV-2 Not detected        Comment: Not Detected results do not preclude SARS-CoV-2 infection and should not be used as the sole basis for patient management decisions. Results must be combined with clinical observations, patient history, and epidemiological information. Influenza A by PCR Not detected        Influenza B by PCR Not detected        Comment: Testing was performed using jenaro Dayana SARS-CoV-2 and Influenza A/B nucleic acid assay. This test is a multiplex Real-Time Reverse Transcriptase Polymerase Chain Reaction (RT-PCR) based in vitro diagnostic test intended for the qualitative detection of nucleic acids from SARS-CoV-2, Influenza A, and Influenza B in nasopharyngeal and nasal swab specimens for use under the FDA's Emergency Use Authorization (EUA) only.        Fact sheet for Patients: FindDrives.pl  Fact sheet for Healthcare Providers: [email or web address]         URINE CULTURE HOLD SAMPLE [[id number]] Collected: 02/02/22 1359    Order Status: Completed Specimen: Serum Updated: 02/02/22 1401     Urine culture hold       Urine on hold in Microbiology dept for 2 days. If unpreserved urine is submitted, it cannot be used for addtional testing after 24 hours, recollection will be required. IMAGING:   XR CHEST PA LAT   Final Result   Bilateral infiltrates are consistent with Covid pneumonia      CTA CHEST W OR W WO CONT    (Results Pending)        ECG/ECHO:    Results for orders placed or performed during the hospital encounter of 02/02/22   EKG, 12 LEAD, INITIAL   Result Value Ref Range    Ventricular Rate 101 BPM    Atrial Rate 101 BPM    P-R Interval 156 ms    QRS Duration 88 ms    Q-T Interval 328 ms    QTC Calculation (Bezet) 425 ms    Calculated P Axis 42 degrees    Calculated R Axis -42 degrees    Calculated T Axis 41 degrees    Diagnosis       Sinus tachycardia  Left axis deviation  Abnormal ECG  No previous ECGs available  Confirmed by Miguelina Paez M.D., hSmuel Stelee ([id number]) on 2/2/2022 3:56:57 PM          Assessment:   Given the patient's current clinical presentation, there is a high level of concern for decompensation if discharged from the emergency department. Complex decision making was performed, which includes reviewing the patient's available past medical records, laboratory results, and imaging studies. Active Problems:    Respiratory failure ([address] (2/2/2022)      Plan:   Acute hypoxic respiratory failure - requiring 2-4L to maintain saturations over 90%  Suspected CHF, pulmonary edema - clnically with mild LE edema, crackles and JVD  -O2 as needed to keep saturations over 90%  -Give 40mg of Lasix now  -Check proBNP, echo  - D dimer elevated, check CTA particularly with sinus tach --> discussed with radiology and premedicate with prednisone and benadryl   - COVID PCR/Influenza negative. Canceled the second COVIDPCR with lab, as ER physician was not aware that COVID/flu is a PCR   - Check ABG  - Follow BMP  - If proBNP and echo negative, can consider ordering viral respiratory panel, given she is immunocompromised (to r/o RSV, etc)    Hyponatremia - suspect hypervolemic  - Monitor BMP  - Check urine sodium, urine osm, serum osm     HTN - on ARB, will resume   RA - on MTX and leukovorin, q8week golimumab, as well as qweek denosumab . Hold for now given unclear if pt has active infection.   - Need to keep in mind at risk for ILD ---> FU CT chest       DIET: ADULT DIET Regular   ISOLATION PRECAUTIONS: There are currently no Active Isolations  CODE STATUS: Full Code   DVT PROPHYLAXIS: Lovenox  FUNCTIONAL STATUS PRIOR TO HOSPITALIZATION: Fully active and ambulatory; able to carry on all self-care without restriction. EARLY MOBILITY ASSESSMENT: Recommend routine ambulation while hospitalized with the assistance of nursing staff  ANTICIPATED DISCHARGE: 24-48 hours. EMERGENCY CONTACT/SURROGATE DECISION MAKER:      CRITICAL CARE WAS PERFORMED FOR THIS ENCOUNTER: NO.      Signed By: Isis Fleming MD     February 2, 2022         Please note that this dictation may have been completed with Dragon, the [employer] voice recognition software. Quite often unanticipated grammatical, syntax, homophones, and other interpretive errors are inadvertently transcribed by the computer software. Please disregard these errors. Please excuse any errors that have escaped final proofreading.

## 2022-02-02 NOTE — ED NOTES
Pt ambulatory to room with steady gait. Pt O2 84% on room air after sitting for a few mins. Placed on 2L NC. O2 brought to 95%.

## 2022-02-02 NOTE — ED NOTES
[address] care of patient from [facility], [address] [place] [address].     1750 - Bedside and Verbal shift change report given to Rubin Marti RN (oncoming nurse) by Vanita Gaytan RN (offgoing nurse). Report included the following information SBAR, ED Summary, Procedure Summary, Intake/Output, MAR, Recent Results and Cardiac Rhythm Sinus tach. Amina Shelton

## 2022-02-02 NOTE — ED PROVIDER NOTES
This is a 79-year-old female with a history of hypertension and rheumatoid arthritis. She presents with an onset of fever, increasing cough and shortness of breath since . She has had a her Covid vaccines. She is taken to home test which are negative for Covid. She was sent here today by her primary physician who saw her in the office and noted that she had a significant oxygen requirement. He sent her here with suspicion of Covid for likely admission. Patient is not complaining of any pain. She has not got any productive cough. She has had no chest pain or abdominal pain and no GI or  symptoms noted. She is more short of breath with activity. Past Medical History:   Diagnosis Date    HLD (hyperlipidemia)     HTN (hypertension)     Rhe[address] [address]        Past Surgical History:   Procedure Laterality Date    HX APPENDECTOMY      HX BREAST BIOPSY Left     Surgical BX (Over 20yrs ago) - BENIGN     HX OTHER SURGICAL      Breast Biopsy         History reviewed. No pertinent family history. Social History     Socioeconomic History    Marital status:      Spouse name: Not on file    Number of children: Not on file    Years of education: Not on file    Highest education level: Not on file   Occupational History    Not on file   Tobacco Use    Smoking status: Former Smoker     Packs/day: 1.00     Years: 40.00     Pack years: 40.00     Types: Cigarettes     Quit date: 10/26/2016     Years since quittin.2    Smokeless tobacco: Never Used   Substance and Sexual Activity    Alcohol use:  Yes    Drug use: No    Sexual activity: Yes     Partners: Male   Other Topics Concern    Not on file   Social History Narrative    Not on file     Social Determinants of Health     Financial Resource Strain:     Difficulty of Paying Living Expenses: Not on file   Food Insecurity:     Worried About [address] in the Last Year: Not on file    [address] in the Last Year: Not on file   Transportation Needs:     Lack of Transportation (Medical): Not on file    Lack of Transportation (Non-Medical): Not on file   Physical Activity:     Days of Exercise per Week: Not on file    Minutes of Exercise per Session: Not on file   Stress:     Feeling of Stress : Not on file   Social Connections:     Frequency of Communication with Friends and Family: Not on file    Frequency of Social Gatherings with Friends and Family: Not on file    Attends Yarsanism Services: Not on file    Active Member of [address] [place] [address] [employer] or Organizations: Not on file    Attends Club or Organization Meetings: Not on file    Marital Status: Not on file   Intimate Partner Violence:     Fear of Current or Ex-Partner: Not on file    Emotionally Abused: Not on file    Physically Abused: Not on file    Sexually Abused: Not on file   Housing Stability:     Unable to Pay for Housing in the Last Year: Not on file    Number of [place] in the Last Year: Not on file    Unstable Housing in the Last Year: Not on file         ALLERGIES: Iodine    Review of Systems    Vitals:    02/02/22 1310 02/02/22 1315 02/02/22 1319 02/02/22 1331   BP: (!) 161/95 (!) 150/88  (!) 149/97   Pulse: (!) 107 (!) 101  (!) 109   Resp: 22 20 22   Temp: 99.1 °F (37.3 °C)      SpO2: (!) 84% 93% 94% 90%   Weight:       Height:                Physical Exam  Vitals and nursing note reviewed. Constitutional:       General: She is in acute distress ( With mild to moderate shortness of breath. ). Appearance: She is well-developed. She is not ill-appearing or diaphoretic. Comments: This is a 70-year-old female who presents with increasing shortness of breath and cough. She has had a fever at home. Cough is been nonproductive. Vital signs are as noted. Patient's oxygen saturation on room air is at 84%. 2 L she is up to 94%. HENT:      Head: Normocephalic and atraumatic.       Nose: Nose normal.   Eyes:      General: No scleral icterus. Conjunctiva/sclera: Conjunctivae normal.      Pupils: Pupils are equal, round, and reactive to light. Neck:      Thyroid: No thyromegaly. Vascular: No JVD. Trachea: No tracheal deviation. Comments: No carotid bruits noted. Cardiovascular:      Rate and Rhythm: Normal rate and regular rhythm. Heart sounds: Normal heart sounds. No murmur heard. No friction rub. No gallop. Pulmonary:      Effort: Pulmonary effort is normal. No respiratory distress. Breath sounds: Normal breath sounds. No wheezing or rales. Chest:      Chest wall: No tenderness. Abdominal:      General: Bowel sounds are normal. There is no distension. Palpations: Abdomen is soft. There is no mass. Tenderness: There is no abdominal tenderness. There is no guarding or rebound. Musculoskeletal:         General: No tenderness. Normal range of motion. Cervical back: Normal range of motion and neck supple. Lymphadenopathy:      Cervical: No cervical adenopathy. Skin:     General: Skin is warm and dry. Capillary Refill: Capillary refill takes 2 to 3 seconds. Findings: No erythema or rash. Neurological:      General: No focal deficit present. Mental Status: She is alert and oriented to person, place, and time. Cranial Nerves: No cranial nerve deficit. Coordination: Coordination normal.      Deep Tendon Reflexes: Reflexes are normal and symmetric. Psychiatric:         Behavior: Behavior normal.         Thought Content:  Thought content normal.         Judgment: Judgment normal.          MDM  Number of Diagnoses or Management Options     Amount and/or Complexity of Data Reviewed  Clinical lab tests: ordered and reviewed  Tests in the radiology section of CPT®: ordered and reviewed  Decide to obtain previous medical records or to obtain history from someone other than the patient: yes  Review and summarize past medical records: yes  Discuss the patient with other providers: yes  Independent visualization of images, tracings, or specimens: yes    Risk of Complications, Morbidity, and/or Mortality  Presenting problems: high  Diagnostic procedures: high  Management options: high    Patient Progress  Patient progress: stable         Procedures  Patient's chest x-ray is consistent with Covid pneumonia. We are doing a rapid and PCR test here with flu as well. Patient is hypoxic on room air and will require admission. The patient's Covid test is negative. A PCR was ordered. Flu swabs were ordered and are negative. The chest x-ray is clearly consistent with Covid pneumonia and her oxygen saturations on room air are down to 84%. Will discuss with the hospitalist for admission for suspected Covid pneumonia and hypoxia. Perfect Serve Consult for Admission  2:47 PM    ED Room Number: ER29/29  Patient Name and age:  Lori Yip 70 y.o.  female  Working Diagnosis:   1. Pneumonia of both lungs due to infectious organism, unspecified part of lung    2.  Hypoxia        COVID-19 Suspicion:  yes  Sepsis present:  no  Reassessment needed: yes  Code Status:  Full Code  Readmission: no  Isolation Requirements:  yes  Recommended Level of Care:  telemetry  Department:[facility] Adult ED - 21   Other:

## 2022-02-02 NOTE — ED TRIAGE NOTES
Sent by MD Jose Jacob for evaluation of hypoxia. C/o SOB for a few days. Has received COVID vaccine and booster. Hypoxic to 89% at MD's office. Patient has hx of RA and is on methotrexate. 92% on RA in triage.

## 2022-02-03 ENCOUNTER — APPOINTMENT (OUTPATIENT)
Dept: NON INVASIVE DIAGNOSTICS | Age: 72
DRG: 196 | End: 2022-02-03
Attending: INTERNAL MEDICINE
Payer: MEDICARE

## 2022-02-03 LAB
ANION GAP SERPL CALC-SCNC: 6 MMOL/L (ref 5–15)
BASOPHILS # BLD: 0 K/UL (ref 0–0.1)
BASOPHILS NFR BLD: 1 % (ref 0–1)
BUN SERPL-MCNC: 10 MG/DL (ref 6–20)
BUN/CREAT SERPL: 16 (ref 12–20)
CALCIUM SERPL-MCNC: 8.5 MG/DL (ref 8.5–10.1)
CHLORIDE SERPL-SCNC: 97 MMOL/L (ref 97–108)
CO2 SERPL-SCNC: 25 MMOL/L (ref 21–32)
CREAT SERPL-MCNC: 0.63 MG/DL (ref 0.55–1.02)
CRP SERPL-MCNC: 13.1 MG/DL (ref 0–0.6)
DIFFERENTIAL METHOD BLD: ABNORMAL
EOSINOPHIL # BLD: 0 K/UL (ref 0–0.4)
EOSINOPHIL NFR BLD: 0 % (ref 0–7)
ERYTHROCYTE [DISTWIDTH] IN BLOOD BY AUTOMATED COUNT: 12.3 % (ref 11.5–14.5)
GLUCOSE SERPL-MCNC: 153 MG/DL (ref 65–100)
HCT VFR BLD AUTO: 37.6 % (ref 35–47)
HGB BLD-MCNC: 12.7 G/DL (ref 11.5–16)
IMM GRANULOCYTES # BLD AUTO: 0 K/UL (ref 0–0.04)
IMM GRANULOCYTES NFR BLD AUTO: 1 % (ref 0–0.5)
LYMPHOCYTES # BLD: 1.2 K/UL (ref 0.8–3.5)
LYMPHOCYTES NFR BLD: 19 % (ref 12–49)
MAGNESIUM SERPL-MCNC: 2.3 MG/DL (ref 1.6–2.4)
MCH RBC QN AUTO: 33.2 PG (ref 26–34)
MCHC RBC AUTO-ENTMCNC: 33.8 G/DL (ref 30–36.5)
MCV RBC AUTO: 98.2 FL (ref 80–99)
MONOCYTES # BLD: 0.5 K/UL (ref 0–1)
MONOCYTES NFR BLD: 8 % (ref 5–13)
NEUTS SEG # BLD: 4.4 K/UL (ref 1.8–8)
NEUTS SEG NFR BLD: 72 % (ref 32–75)
NRBC # BLD: 0 K/UL (ref 0–0.01)
NRBC BLD-RTO: 0 PER 100 WBC
PHOSPHATE SERPL-MCNC: 3.6 MG/DL (ref 2.6–4.7)
PLATELET # BLD AUTO: 313 K/UL (ref 150–400)
POTASSIUM SERPL-SCNC: 3.9 MMOL/L (ref 3.5–5.1)
RBC # BLD AUTO: 3.83 M/UL (ref 3.8–5.2)
SODIUM SERPL-SCNC: 128 MMOL/L (ref 136–145)
WBC # BLD AUTO: 6.1 K/UL (ref 3.6–11)

## 2022-02-03 PROCEDURE — 65660000000 HC RM CCU STEPDOWN

## 2022-02-03 PROCEDURE — 77010033678 HC OXYGEN DAILY

## 2022-02-03 PROCEDURE — 74011250637 HC RX REV CODE- 250/637: Performed by: INTERNAL MEDICINE

## 2022-02-03 PROCEDURE — 93005 ELECTROCARDIOGRAM TRACING: CPT

## 2022-02-03 PROCEDURE — 74011000250 HC RX REV CODE- 250: Performed by: INTERNAL MEDICINE

## 2022-02-03 PROCEDURE — 85025 COMPLETE CBC W/AUTO DIFF WBC: CPT

## 2022-02-03 PROCEDURE — 84100 ASSAY OF PHOSPHORUS: CPT

## 2022-02-03 PROCEDURE — 74011250636 HC RX REV CODE- 250/636: Performed by: STUDENT IN AN ORGANIZED HEALTH CARE EDUCATION/TRAINING PROGRAM

## 2022-02-03 PROCEDURE — 86140 C-REACTIVE PROTEIN: CPT

## 2022-02-03 PROCEDURE — 80048 BASIC METABOLIC PNL TOTAL CA: CPT

## 2022-02-03 PROCEDURE — 74011000250 HC RX REV CODE- 250: Performed by: NURSE PRACTITIONER

## 2022-02-03 PROCEDURE — 83735 ASSAY OF MAGNESIUM: CPT

## 2022-02-03 PROCEDURE — 74011250636 HC RX REV CODE- 250/636: Performed by: NURSE PRACTITIONER

## 2022-02-03 PROCEDURE — 74011250636 HC RX REV CODE- 250/636: Performed by: INTERNAL MEDICINE

## 2022-02-03 PROCEDURE — 36415 COLL VENOUS BLD VENIPUNCTURE: CPT

## 2022-02-03 RX ORDER — LOSARTAN POTASSIUM 50 MG/1
100 TABLET ORAL DAILY
Status: DISCONTINUED | OUTPATIENT
Start: 2022-02-03 | End: 2022-02-08 | Stop reason: HOSPADM

## 2022-02-03 RX ORDER — FUROSEMIDE 10 MG/ML
40 INJECTION INTRAMUSCULAR; INTRAVENOUS DAILY
Status: DISCONTINUED | OUTPATIENT
Start: 2022-02-03 | End: 2022-02-03

## 2022-02-03 RX ORDER — FUROSEMIDE 10 MG/ML
20 INJECTION INTRAMUSCULAR; INTRAVENOUS DAILY
Status: DISCONTINUED | OUTPATIENT
Start: 2022-02-03 | End: 2022-02-04

## 2022-02-03 RX ADMIN — FUROSEMIDE 20 MG: 10 INJECTION, SOLUTION INTRAMUSCULAR; INTRAVENOUS at 10:53

## 2022-02-03 RX ADMIN — ACETAMINOPHEN 650 MG: 325 TABLET ORAL at 20:17

## 2022-02-03 RX ADMIN — AZITHROMYCIN MONOHYDRATE 500 MG: 500 INJECTION, POWDER, LYOPHILIZED, FOR SOLUTION INTRAVENOUS at 00:38

## 2022-02-03 RX ADMIN — WATER 2 G: 1 INJECTION INTRAMUSCULAR; INTRAVENOUS; SUBCUTANEOUS at 00:38

## 2022-02-03 RX ADMIN — AZITHROMYCIN MONOHYDRATE 500 MG: 500 INJECTION, POWDER, LYOPHILIZED, FOR SOLUTION INTRAVENOUS at 21:43

## 2022-02-03 RX ADMIN — SODIUM CHLORIDE, PRESERVATIVE FREE 10 ML: 5 INJECTION INTRAVENOUS at 21:51

## 2022-02-03 RX ADMIN — WATER 2 G: 1 INJECTION INTRAMUSCULAR; INTRAVENOUS; SUBCUTANEOUS at 21:42

## 2022-02-03 RX ADMIN — SODIUM CHLORIDE, PRESERVATIVE FREE 10 ML: 5 INJECTION INTRAVENOUS at 00:39

## 2022-02-03 RX ADMIN — SODIUM CHLORIDE, PRESERVATIVE FREE 10 ML: 5 INJECTION INTRAVENOUS at 14:00

## 2022-02-03 RX ADMIN — ENOXAPARIN SODIUM 40 MG: 100 INJECTION SUBCUTANEOUS at 17:47

## 2022-02-03 NOTE — PROGRESS NOTES
Patient's CTA negative for PE, extensive airspace disease and small pleural effusion noted. Findings discussed with Dr. Jennifer Hawthorne, though pro calcitonin is negative will add Rocephin and Azithromycin in light of patient being immunocompromised.

## 2022-02-03 NOTE — PROGRESS NOTES
TRANSFER - OUT REPORT:    Verbal report given to JOSSE Spence(name) on Mone Campbell  being transferred to (unit) for routine progression of care       Report consisted of patients Situation, Background, Assessment and   Recommendations(SBAR). Information from the following report(s) SBAR, Kardex, [facility], Recent Results and Cardiac Rhythm NSR/Sinus Tach was reviewed with the receiving nurse. Lines:   Peripheral IV 02/02/22 Left Antecubital (Active)   Site Assessment Clean, dry, & intact 02/03/22 0827   Phlebitis Assessment 0 02/03/22 0827   Infiltration Assessment 0 02/03/22 0827   Dressing Status Clean, dry, & intact 02/03/22 0827   Dressing Type Transparent;Tape 02/03/22 0827   Hub Color/Line Status Patent; Flushed;Capped;Pink 02/03/22 0827   Action Taken Open ports on tubing capped 02/03/22 0827   Alcohol Cap Used Yes 02/03/22 0827        Opportunity for questions and clarification was provided.       Patient transported with:   O2 @ 4 liters

## 2022-02-03 NOTE — CONSULTS
[id number] dictated    A:  Hyponatremia- likely due to excessive free water intake (reports drinking 4-5 12 oz glasses of tea besides other liquids) compounded by poor solute intake. ? SIADH in the setting of PNA would be diagnosis of exclusion.  Need to exclude occult hypothyroidism    HTN  SOB/Hypoxa  PNA- Covid testing is negative    P:  FR of 1.2 L/day ordered  Check Urine Na, Osm  TSH and Uric acid with AM labs  Encourage increase solute intake  Further Rx based on urine studies and Na levels  Avoid thiazides, SSRI, NSAIDs    D/W pt    Odalys Graf MD  [facility]

## 2022-02-03 NOTE — PROGRESS NOTES
Transition of Care Plan  1. RUR- 6%  2. DISPOSITION: TBD/subject to change pending recommendations; pending medical progression   -Anticipate home with family with assistance once medically stable   -Nephrology consulted and following  3. F/U with PCP/Specialist    4. Transport: Family    Reason for Admission:  Respiratory failure ([address]                     RUR Score:      6%               Plan for utilizing home health:   Not at this time. TBD/subject to change pending recommendations. PCP: Yes First and Last name:  Sherine Gallagher MD     Name of Practice: [facility]   Are you a current patient: Yes/No: YES   Approximate date of last visit: 2/2/22   Can you participate in a virtual visit with your PCP: Yes                    Current Advanced Directive/Advance Care Plan: Full Code      Healthcare Decision Maker:   Sudha Camachocarolyne [phone]/ [phone]           70year old female, AOx3. Independent with ADL's and IADL's. No DME utilized. Currently on O2 and does not utilize at home. Patient is retired  from [employer]. Receives SS/Pension as source of income with no significant financial stressors or concerns at this time. Insurance verified: Medicare A&B/ [employer]. [facility] is utilized for prescription. Care Management Interventions  PCP Verified by CM: Yes  Last Visit to PCP: 02/02/22  Palliative Care Criteria Met (RRAT>21 & CHF Dx)?: No  Mode of Transport at Discharge:  Other (see comment) (Family)  Transition of Care Consult (CM Consult):  (No current CM consult or need at this time)  Discharge Durable Medical Equipment: No  Physical Therapy Consult: No  Occupational Therapy Consult: No  Speech Therapy Consult: No  Support Systems: Spouse/Significant Other  Confirm Follow Up Transport: Family  Discharge Location  Patient Expects to be Discharged to[de-identified] Home with family assistance (TBD/subject to change pending recommendations at this time.)    RENETTA Iqbal/INGRIS  Care Management  12:57 PM

## 2022-02-03 NOTE — CONSULTS
[address]    Name:  Isela Richter  MR#:  [id number]  :  1950  ACCOUNT #:  [de-identified]  DATE OF SERVICE:  2022    RENAL CONSULTATION    REQUESTING PHYSICIAN:  Dr. Deborah Pugh. REASON FOR CONSULTATION:  For evaluation and management of hyponatremia. The patient was seen and examined in ED. History obtained from the patient and chart review. HISTORY OF PRESENT ILLNESS:  The patient is a 75-year-old female with past medical history significant for hypertension and rheumatoid arthritis, on methotrexate, presented with 3 days of worsening shortness of breath and hypoxia. She also has associated nonproductive dry cough. She has been vaccinated. There was concern of COVID-19, and PCP referred her to ED. Chest x-ray showed bilateral infiltrates suggestive of COVID pneumonia. Her repeat COVID-19 PCR test done in the hospital was negative. She was noted to have hyponatremia on her admission with a serum sodium of 129 yesterday and down to 128 today. The patient admits of drinking fair amounts of liquids, mainly tea, besides other liquids including coffee, soda, milk, etc.  She denies taking any NSAIDs. She is not on any thiazide diuretics or SSRI. Her blood pressure was actually elevated. Her appetite has been poor the last couple of days. She denied any nausea, vomiting or diarrhea. She seems alert, awake and fully oriented. No history of seizures or fall. No confusion. No memory loss. REVIEW OF SYSTEMS:  As noted in HPI. Remainder review of systems obtained and negative.     Past Medical History:   Diagnosis Date    HLD (hyperlipidemia)     HTN (hypertension)     Rheumatoid arthritis ([facility] [address]      Past Surgical History:   Procedure Laterality Date    HX APPENDECTOMY      HX BREAST BIOPSY Left     Surgical BX (Over 20yrs ago) - BENIGN     HX OTHER SURGICAL      Breast Biopsy     Allergies   Allergen Reactions    Iodine Rash Prior to Admission Medications   Prescriptions Last Dose Informant Patient Reported? Taking? FOLIC ACID/MULTIVIT-MIN/LUTEIN (CENTRUM SILVER PO)   Yes No   Sig: Take 1 Tab by mouth daily. ibuprofen (MOTRIN) 600 mg tablet   Yes No   Sig: Take 600 mg by mouth daily. leucovorin calcium (WELLCOVORIN) 5 mg tablet   Yes No   Sig: TK 1  T  PO  FOR  3  DAYS  BEGINNING  THE  DAY  AFTER  METHOTREXATE  DOSE   methotrexate (RHEUMATREX) 2.5 mg tablet   Yes No   Sig: TK  5  TS  PO  Q  WEEK  ON  THE  SAME  DAY   predniSONE (DELTASONE) 10 mg tablet   Yes No   telmisartan (MICARDIS) 80 mg tablet   Yes Yes   Sig: Take 80 mg by mouth daily. vitamin a-vitamin c-vit e-min (OCUVITE) tablet   Yes No   Sig: Take 1 Tab by mouth daily. Facility-Administered Medications: None     Social History     Tobacco Use    Smoking status: Former Smoker     Packs/day: 1.00     Years: 40.00     Pack years: 40.00     Types: Cigarettes     Quit date: 10/26/2016     Years since quittin.2    Smokeless tobacco: Never Used   Substance Use Topics    Alcohol use: Yes    Drug use: No     History reviewed. No pertinent family history. PHYSICAL EXAMINATION:  GENERAL:  Elderly female, well-built, well-nourished, in no acute distress. VITAL SIGNS:  Stable except blood pressure is elevated at 168/114 and borderline tachycardic. HEENT:  Head is atraumatic, normocephalic. Conjunctivae are pink. Mucous membranes are moist.  Pupils are equal, round, reactive to light. Extraocular muscles are intact. LUNGS:  Bibasilar crackles noted. No overt respiratory distress. HEART:  Regular rate and rhythm with mild tachycardia. ABDOMEN:  Soft, nontender, active bowel sounds. EXTREMITIES:  No clubbing or cyanosis. Trace to 1+ edema, right greater than left. CNS:  A and O x3. SKIN:  Warm and dry. LABORATORY DATA:  Labs as reviewed in HPI. In addition, her CBC is normal.  No recent TSH. Urine sodium and osmolality have been ordered.   CTA of the chest with IV contrast was negative for PE. There was extensive airspace disease throughout the lungs and small left pleural effusion. ASSESSMENT AND PLAN:  Please refer to my note in the Epic.       Richard Betancourt MD      [id number]  D:  02/03/2022 10:33  T:  02/03/2022 11:34  JOB #:  [id number]

## 2022-02-03 NOTE — PROGRESS NOTES
[address] [facility]  Hospitalist Group                                                                                          Hospitalist Progress Note  Reida Kayser, DO  Answering service: [phone] from in house phone        Date of Service:  2/3/2022  NAME:  Dell Cabrera  :  1950  MRN:  [id number]      Admission Summary:   Dell Cabrera is a 70 y.o. female with pmh of HTN, Rheumatoid arthritis on MTX, and immunosuppresants presents with 3 days of worsening SOB. Patient was seen by her primary care physician today for complaints of shortness of breath, was noted to be hypoxic to the 80s on room air and sent to the emergency department for further evaluation, and concern for COVID-19. Patient has been vaccinated x3, however notably is immunocompromised with rheumatoid arthritis. Patient denies any fevers at home, she notes that she has been weak, increase shortness of breath. She is unable to barely walk up steps or even walk a few blocks without having to stop and catch her breath. She denied any lower extremity edema, para nocturnal dyspnea, orthopnea. But she does use 2 pillows to sleep at night, however is always done so. She does have a dry non productive cough. Denies any palpitations, chest pain, nausea vomiting or diarrhea. No musculoskeletal pain. Of note she does have a h/o pleural effusion which had to be drained multiple times (and cause was uncertain), she has since always had a \"tiny small one\" which never went away.     On arrival to the emergency department patient was found to be desaturating to the 80s on room air, placed on 2 L with improvement. Chest x-ray with bilateral infiltrates. COVID-19 PCR done and negative. Patient referred for admission to the hospitalist.    Chest CTA was done with evidence of pneumonia negative for PE       Interval history / Subjective:   Patient was seen and examined this morning. She denies any new complaints.   She remains on oxygen therapy at 4 L. Denies fever, chills, chest pain, abdominal pain, nausea, vomiting and diarrhea. Overnight events reported by nursing staff. Assessment & Plan:     Acute hypoxic respiratory failure - requiring 4L secondary to below  Acute CHF, unknown EF, pulmonary edema  -Started Lasix 20mg daily, monitor I&O, fluid restriction   -Pending echocardiogram    Community-acquired pneumonia:  -Continue antibiotic therapy with Rocephin/azithromycin     Hyponatremia, rule out SIADH  - Monitor BMP   - Check urine sodium, urine osm, serum osm   -Consulted nephrology, appreciate recommendations     HTN -continue losartan 100 mg daily    RA - on MTX and leukovorin, q8week golimumab, as well as qweek denosumab . Hold for now given unclear if pt has active infection. Further recommendations pending clinical course         CODE STATUS: Full Code   DVT PROPHYLAXIS: Lovenox  FUNCTIONAL STATUS PRIOR TO HOSPITALIZATION: Fully active and ambulatory; able to carry on all self-care without restriction. EARLY MOBILITY ASSESSMENT: Recommend routine ambulation while hospitalized with the assistance of nursing staff  ANTICIPATED DISCHARGE: 24-48 hours. EMERGENCY CONTACT/SURROGATE DECISION MAKER: Manisha          Hospital Problems  Date Reviewed: 4/17/2018          Codes Class Noted POA    Respiratory failure [facility]) ICD-10-CM: J96.90  ICD-9-CM: 518.81  2/2/2022 Unknown                Review of Systems:   A comprehensive review of systems was negative except for that written in the HPI. Vital Signs:    Last 24hrs VS reviewed since prior progress note.  Most recent are:  Visit Vitals  /71   Pulse 100   Temp 98.3 °F (36.8 °C)   Resp 20   Ht 5' 5\" (1.651 m)   Wt 90.7 kg (200 lb)   SpO2 93%   BMI 33.28 kg/m²         Intake/Output Summary (Last 24 hours) at 2/3/2022 1546  Last data filed at 2/3/2022 1105  Gross per 24 hour   Intake 200 ml   Output    Net 200 ml        Physical Examination:     I had a face to face encounter with this patient and independently examined them on 2/3/2022 as outlined below:          Constitutional:  No acute distress, cooperative, pleasant    ENT:  Oral mucosa moist, oropharynx benign. Resp:  Decreased breath sounds bilaterally, hypoxia, on oxygen, rales   CV:  Regular rhythm, normal rate, no murmurs, gallops, rubs    GI:  Soft, non distended, non tender. normoactive bowel sounds, no hepatosplenomegaly     Musculoskeletal:  1+ pitting edema in the lower extremities, warm, 2+ pulses throughout    Neurologic:  Moves all extremities. AAOx3, CN II-XII reviewed            Data Review:    Review and/or order of clinical lab test      Labs:     Recent Labs     02/03/22  0234 02/02/22  1043   WBC 6.1 7.1   HGB 12.7 13.1   HCT 37.6 38.5    327     Recent Labs     02/03/22  0056 02/02/22  1043   * 129*   K 3.9 3.7   CL 97 96*   CO2 25 27   BUN 10 13   CREA 0.63 0.61   * 101*   CA 8.5 8.8   MG 2.3  --    PHOS 3.6  --      Recent Labs     02/02/22  1043   ALT 29   AP 66   TBILI 0.5   TP 6.6   ALB 2.5*   GLOB 4.1*     No results for input(s): INR, PTP, APTT, INREXT in the last 72 hours. No results for input(s): FE, TIBC, PSAT, FERR in the last 72 hours. No results found for: FOL, RBCF   Recent Labs     02/02/22  1621   PH 7.42   PCO2 40   PO2 82     No results for input(s): CPK, CKNDX, TROIQ in the last 72 hours.     No lab exists for component: CPKMB  No results found for: CHOL, CHOLX, CHLST, CHOLV, HDL, HDLP, LDL, LDLC, DLDLP, TGLX, TRIGL, TRIGP, CHHD, CHHDX  No results found for: GLUCPOC  No results found for: COLOR, APPRN, SPGRU, REFSG, MANE, PROTU, GLUCU, KETU, BILU, UROU, NIMA, LEUKU, GLUKE, EPSU, BACTU, WBCU, RBCU, CASTS, UCRY      Medications Reviewed:     Current Facility-Administered Medications   Medication Dose Route Frequency    losartan (COZAAR) tablet 100 mg  100 mg Oral DAILY    furosemide (LASIX) injection 20 mg  20 mg IntraVENous DAILY    sodium chloride (NS) flush 5-40 mL  5-40 mL IntraVENous Q8H    sodium chloride (NS) flush 5-40 mL  5-40 mL IntraVENous PRN    acetaminophen (TYLENOL) tablet 650 mg  650 mg Oral Q6H PRN    Or    acetaminophen (TYLENOL) suppository 650 mg  650 mg Rectal Q6H PRN    polyethylene glycol (MIRALAX) packet 17 g  17 g Oral DAILY PRN    ondansetron (ZOFRAN ODT) tablet 4 mg  4 mg Oral Q8H PRN    Or    ondansetron (ZOFRAN) injection 4 mg  4 mg IntraVENous Q6H PRN    enoxaparin (LOVENOX) injection 40 mg  40 mg SubCUTAneous Q24H    cefTRIAXone (ROCEPHIN) 2 g in sterile water (preservative free) 20 mL IV syringe  2 g IntraVENous Q24H    azithromycin (ZITHROMAX) 500 mg in 0.9% sodium chloride 250 mL (VIAL-MATE)  500 mg IntraVENous Q24H     ______________________________________________________________________  EXPECTED LENGTH OF STAY: 3d 14h  ACTUAL LENGTH OF STAY:          1                 Saqib Jean-Baptiste DO

## 2022-02-04 ENCOUNTER — APPOINTMENT (OUTPATIENT)
Dept: NON INVASIVE DIAGNOSTICS | Age: 72
DRG: 196 | End: 2022-02-04
Attending: INTERNAL MEDICINE
Payer: MEDICARE

## 2022-02-04 LAB
ALBUMIN SERPL-MCNC: 2.3 G/DL (ref 3.5–5)
ALBUMIN/GLOB SERPL: 0.5 {RATIO} (ref 1.1–2.2)
ALP SERPL-CCNC: 58 U/L (ref 45–117)
ALT SERPL-CCNC: 37 U/L (ref 12–78)
ANION GAP SERPL CALC-SCNC: 5 MMOL/L (ref 5–15)
AST SERPL-CCNC: 49 U/L (ref 15–37)
ATRIAL RATE: 91 BPM
BASOPHILS # BLD: 0.1 K/UL (ref 0–0.1)
BASOPHILS NFR BLD: 1 % (ref 0–1)
BILIRUB SERPL-MCNC: 0.5 MG/DL (ref 0.2–1)
BUN SERPL-MCNC: 14 MG/DL (ref 6–20)
BUN/CREAT SERPL: 27 (ref 12–20)
CALCIUM SERPL-MCNC: 8.3 MG/DL (ref 8.5–10.1)
CALCULATED P AXIS, ECG09: 51 DEGREES
CALCULATED R AXIS, ECG10: -29 DEGREES
CALCULATED T AXIS, ECG11: 29 DEGREES
CHLORIDE SERPL-SCNC: 96 MMOL/L (ref 97–108)
CO2 SERPL-SCNC: 28 MMOL/L (ref 21–32)
CREAT SERPL-MCNC: 0.51 MG/DL (ref 0.55–1.02)
DIAGNOSIS, 93000: NORMAL
DIFFERENTIAL METHOD BLD: NORMAL
ECHO AO ROOT DIAM: 3.4 CM
ECHO AO ROOT INDEX: 1.7 CM/M2
ECHO AV AREA PEAK VELOCITY: 2.2 CM2
ECHO AV AREA PEAK VELOCITY: 2.2 CM2
ECHO AV PEAK GRADIENT: 17 MMHG
ECHO AV PEAK VELOCITY: 2.1 M/S
ECHO AV VELOCITY RATIO: 0.52
ECHO EST RA PRESSURE: 3 MMHG
ECHO LA DIAMETER INDEX: 2.1 CM/M2
ECHO LA DIAMETER: 4.2 CM
ECHO LA TO AORTIC ROOT RATIO: 1.24
ECHO LV E' SEPTAL VELOCITY: 7 CM/S
ECHO LV FRACTIONAL SHORTENING: 33 % (ref 28–44)
ECHO LV INTERNAL DIMENSION DIASTOLE INDEX: 2.1 CM/M2
ECHO LV INTERNAL DIMENSION DIASTOLIC: 4.2 CM (ref 3.9–5.3)
ECHO LV INTERNAL DIMENSION SYSTOLIC INDEX: 1.4 CM/M2
ECHO LV INTERNAL DIMENSION SYSTOLIC: 2.8 CM
ECHO LV IVSD: 1.1 CM (ref 0.6–0.9)
ECHO LV MASS 2D: 147 G (ref 67–162)
ECHO LV MASS INDEX 2D: 73.5 G/M2 (ref 43–95)
ECHO LV POSTERIOR WALL DIASTOLIC: 1 CM (ref 0.6–0.9)
ECHO LV RELATIVE WALL THICKNESS RATIO: 0.48
ECHO LVOT AREA: 4.2 CM2
ECHO LVOT DIAM: 2.3 CM
ECHO LVOT PEAK GRADIENT: 5 MMHG
ECHO LVOT PEAK VELOCITY: 1.1 M/S
ECHO MV A VELOCITY: 1.08 M/S
ECHO MV AREA PHT: 4.6 CM2
ECHO MV E DECELERATION TIME (DT): 163.5 MS
ECHO MV E VELOCITY: 0.82 M/S
ECHO MV E/A RATIO: 0.76
ECHO MV E/E' SEPTAL: 11.71
ECHO MV PRESSURE HALF TIME (PHT): 47.4 MS
ECHO PV MAX VELOCITY: 0.9 M/S
ECHO PV PEAK GRADIENT: 3 MMHG
ECHO RIGHT VENTRICULAR SYSTOLIC PRESSURE (RVSP): 35 MMHG
ECHO RV FREE WALL PEAK S': 15 CM/S
ECHO RV TAPSE: 2.1 CM (ref 1.5–2)
ECHO TV REGURGITANT MAX VELOCITY: 2.85 M/S
ECHO TV REGURGITANT PEAK GRADIENT: 33 MMHG
EOSINOPHIL # BLD: 0.2 K/UL (ref 0–0.4)
EOSINOPHIL NFR BLD: 2 % (ref 0–7)
ERYTHROCYTE [DISTWIDTH] IN BLOOD BY AUTOMATED COUNT: 12.4 % (ref 11.5–14.5)
GLOBULIN SER CALC-MCNC: 4.3 G/DL (ref 2–4)
GLUCOSE SERPL-MCNC: 101 MG/DL (ref 65–100)
HCT VFR BLD AUTO: 37.1 % (ref 35–47)
HGB BLD-MCNC: 12.7 G/DL (ref 11.5–16)
IMM GRANULOCYTES # BLD AUTO: 0 K/UL (ref 0–0.04)
IMM GRANULOCYTES NFR BLD AUTO: 0 % (ref 0–0.5)
LYMPHOCYTES # BLD: 1.4 K/UL (ref 0.8–3.5)
LYMPHOCYTES NFR BLD: 17 % (ref 12–49)
MAGNESIUM SERPL-MCNC: 2.3 MG/DL (ref 1.6–2.4)
MCH RBC QN AUTO: 33.4 PG (ref 26–34)
MCHC RBC AUTO-ENTMCNC: 34.2 G/DL (ref 30–36.5)
MCV RBC AUTO: 97.6 FL (ref 80–99)
MONOCYTES # BLD: 0.9 K/UL (ref 0–1)
MONOCYTES NFR BLD: 11 % (ref 5–13)
NEUTS SEG # BLD: 5.9 K/UL (ref 1.8–8)
NEUTS SEG NFR BLD: 69 % (ref 32–75)
NRBC # BLD: 0 K/UL (ref 0–0.01)
NRBC BLD-RTO: 0 PER 100 WBC
OSMOLALITY UR: 855 MOSM/KG H2O
P-R INTERVAL, ECG05: 164 MS
PLATELET # BLD AUTO: 346 K/UL (ref 150–400)
PMV BLD AUTO: 9 FL (ref 8.9–12.9)
POTASSIUM SERPL-SCNC: 3.6 MMOL/L (ref 3.5–5.1)
PROT SERPL-MCNC: 6.6 G/DL (ref 6.4–8.2)
Q-T INTERVAL, ECG07: 348 MS
QRS DURATION, ECG06: 92 MS
QTC CALCULATION (BEZET), ECG08: 428 MS
RBC # BLD AUTO: 3.8 M/UL (ref 3.8–5.2)
SODIUM SERPL-SCNC: 129 MMOL/L (ref 136–145)
SODIUM UR-SCNC: 106 MMOL/L
TSH SERPL DL<=0.05 MIU/L-ACNC: 0.42 UIU/ML (ref 0.36–3.74)
URATE SERPL-MCNC: 3 MG/DL (ref 2.6–6)
VENTRICULAR RATE, ECG03: 91 BPM
WBC # BLD AUTO: 8.5 K/UL (ref 3.6–11)

## 2022-02-04 PROCEDURE — 80053 COMPREHEN METABOLIC PANEL: CPT

## 2022-02-04 PROCEDURE — 74011250636 HC RX REV CODE- 250/636: Performed by: NURSE PRACTITIONER

## 2022-02-04 PROCEDURE — 85025 COMPLETE CBC W/AUTO DIFF WBC: CPT

## 2022-02-04 PROCEDURE — 65660000000 HC RM CCU STEPDOWN

## 2022-02-04 PROCEDURE — 97535 SELF CARE MNGMENT TRAINING: CPT

## 2022-02-04 PROCEDURE — 83735 ASSAY OF MAGNESIUM: CPT

## 2022-02-04 PROCEDURE — 74011250636 HC RX REV CODE- 250/636: Performed by: INTERNAL MEDICINE

## 2022-02-04 PROCEDURE — 74011250637 HC RX REV CODE- 250/637: Performed by: INTERNAL MEDICINE

## 2022-02-04 PROCEDURE — 74011000250 HC RX REV CODE- 250: Performed by: INTERNAL MEDICINE

## 2022-02-04 PROCEDURE — 74011250636 HC RX REV CODE- 250/636: Performed by: STUDENT IN AN ORGANIZED HEALTH CARE EDUCATION/TRAINING PROGRAM

## 2022-02-04 PROCEDURE — 93306 TTE W/DOPPLER COMPLETE: CPT

## 2022-02-04 PROCEDURE — 93306 TTE W/DOPPLER COMPLETE: CPT | Performed by: SPECIALIST

## 2022-02-04 PROCEDURE — 74011000250 HC RX REV CODE- 250: Performed by: NURSE PRACTITIONER

## 2022-02-04 PROCEDURE — 84550 ASSAY OF BLOOD/URIC ACID: CPT

## 2022-02-04 PROCEDURE — 97165 OT EVAL LOW COMPLEX 30 MIN: CPT

## 2022-02-04 PROCEDURE — 74011250637 HC RX REV CODE- 250/637: Performed by: NURSE PRACTITIONER

## 2022-02-04 PROCEDURE — 74011250637 HC RX REV CODE- 250/637: Performed by: STUDENT IN AN ORGANIZED HEALTH CARE EDUCATION/TRAINING PROGRAM

## 2022-02-04 PROCEDURE — 84443 ASSAY THYROID STIM HORMONE: CPT

## 2022-02-04 PROCEDURE — 97116 GAIT TRAINING THERAPY: CPT

## 2022-02-04 PROCEDURE — 97161 PT EVAL LOW COMPLEX 20 MIN: CPT

## 2022-02-04 PROCEDURE — 36415 COLL VENOUS BLD VENIPUNCTURE: CPT

## 2022-02-04 PROCEDURE — 77010033678 HC OXYGEN DAILY

## 2022-02-04 PROCEDURE — 84300 ASSAY OF URINE SODIUM: CPT

## 2022-02-04 PROCEDURE — 83935 ASSAY OF URINE OSMOLALITY: CPT

## 2022-02-04 RX ORDER — GUAIFENESIN 600 MG/1
600 TABLET, EXTENDED RELEASE ORAL EVERY 12 HOURS
Status: DISCONTINUED | OUTPATIENT
Start: 2022-02-04 | End: 2022-02-08 | Stop reason: HOSPADM

## 2022-02-04 RX ORDER — FLUTICASONE PROPIONATE 50 MCG
2 SPRAY, SUSPENSION (ML) NASAL DAILY
Status: DISCONTINUED | OUTPATIENT
Start: 2022-02-04 | End: 2022-02-08 | Stop reason: HOSPADM

## 2022-02-04 RX ORDER — FUROSEMIDE 10 MG/ML
40 INJECTION INTRAMUSCULAR; INTRAVENOUS DAILY
Status: DISCONTINUED | OUTPATIENT
Start: 2022-02-04 | End: 2022-02-06

## 2022-02-04 RX ORDER — IPRATROPIUM BROMIDE AND ALBUTEROL SULFATE 2.5; .5 MG/3ML; MG/3ML
3 SOLUTION RESPIRATORY (INHALATION)
Status: DISCONTINUED | OUTPATIENT
Start: 2022-02-04 | End: 2022-02-08 | Stop reason: HOSPADM

## 2022-02-04 RX ADMIN — WATER 2 G: 1 INJECTION INTRAMUSCULAR; INTRAVENOUS; SUBCUTANEOUS at 21:19

## 2022-02-04 RX ADMIN — FUROSEMIDE 40 MG: 10 INJECTION, SOLUTION INTRAMUSCULAR; INTRAVENOUS at 09:27

## 2022-02-04 RX ADMIN — GUAIFENESIN 600 MG: 600 TABLET, EXTENDED RELEASE ORAL at 09:23

## 2022-02-04 RX ADMIN — GUAIFENESIN 600 MG: 600 TABLET, EXTENDED RELEASE ORAL at 21:20

## 2022-02-04 RX ADMIN — SODIUM CHLORIDE, PRESERVATIVE FREE 10 ML: 5 INJECTION INTRAVENOUS at 06:39

## 2022-02-04 RX ADMIN — IPRATROPIUM BROMIDE AND ALBUTEROL SULFATE 3 ML: .5; 3 SOLUTION RESPIRATORY (INHALATION) at 03:35

## 2022-02-04 RX ADMIN — ENOXAPARIN SODIUM 40 MG: 100 INJECTION SUBCUTANEOUS at 17:27

## 2022-02-04 RX ADMIN — AZITHROMYCIN MONOHYDRATE 500 MG: 500 INJECTION, POWDER, LYOPHILIZED, FOR SOLUTION INTRAVENOUS at 21:18

## 2022-02-04 RX ADMIN — SODIUM CHLORIDE, PRESERVATIVE FREE 10 ML: 5 INJECTION INTRAVENOUS at 14:00

## 2022-02-04 RX ADMIN — FLUTICASONE PROPIONATE 2 SPRAY: 50 SPRAY, METERED NASAL at 17:27

## 2022-02-04 RX ADMIN — SODIUM CHLORIDE, PRESERVATIVE FREE 10 ML: 5 INJECTION INTRAVENOUS at 21:19

## 2022-02-04 RX ADMIN — LOSARTAN POTASSIUM 100 MG: 50 TABLET, FILM COATED ORAL at 09:23

## 2022-02-04 NOTE — PROGRESS NOTES
Name: Rita Dexter   MRN: [id number]  : 1950        Assessment:  Hyponatremia- likely due to excessive free water intake (reports drinking 4-5 12 oz glasses of tea besides other liquids) compounded by poor solute intake. Urine studies (high urine Na and U. Osm) + lowish uric acid>>suggestive of SIADH. TSH is nl     HTN  SOB/Hypoxia  PNA- Covid testing is negative    Na is slight better. Urine studies suggestive of SIADH; Urine Na can be high from Lasix, but U. Osm is quite high.      Plan/Recommendations:  Ct FR of 1.2 L/day  IV lasix has been increased to 40 mg every day- which should also help correction of Na in the setting of SIADH  May need maintenance lasix 40 mg PO QD  Encourage increase solute intake- can have REGULAR DIET. No salt today on her lunch  Avoid thiazides, SSRI, NSAIDs  AM labs  IV ABx per 1ry team    Subjective:  oob in chair. \"I dont like the food.  There is no salt on it\"    ROS:   No nausea, no vomiting  No chest pain, +improved  shortness of breath    Exam:  Visit Vitals  /78 (BP 1 Location: Left upper arm, BP Patient Position: At rest;Lying)   Pulse (!) 103   Temp 98.5 °F (36.9 °C)   Resp 22   Ht 5' 5\" (1.651 m)   Wt 93.4 kg (206 lb)   SpO2 96%   BMI 34.28 kg/m²       NAD  No icterus, mmm  Coarse basilar rales, no distress  RRR  Tr edema  AOx3    Current Facility-Administered Medications   Medication Dose Route Frequency Last Admin    guaiFENesin ER (MUCINEX) tablet 600 mg  600 mg Oral Q12H 600 mg at 22 0923    albuterol-ipratropium (DUO-NEB) 2.5 MG-0.5 MG/3 ML  3 mL Nebulization Q6H PRN 3 mL at 22 0335    furosemide (LASIX) injection 40 mg  40 mg IntraVENous DAILY 40 mg at 22 09    fluticasone propionate (FLONASE) 50 mcg/actuation nasal spray 2 Spray  2 Spray Both Nostrils DAILY      sodium chloride (OCEAN) 0.65 % nasal squeeze bottle 2 Spray  2 Spray Both Nostrils Q2H PRN      losartan (COZAAR) tablet 100 mg  100 mg Oral DAILY 100 mg at 02/04/22 0923    sodium chloride (NS) flush 5-40 mL  5-40 mL IntraVENous Q8H 10 mL at 02/04/22 0639    sodium chloride (NS) flush 5-40 mL  5-40 mL IntraVENous PRN      acetaminophen (TYLENOL) tablet 650 mg  650 mg Oral Q6H  mg at 02/03/22 2017    Or    acetaminophen (TYLENOL) suppository 650 mg  650 mg Rectal Q6H PRN      polyethylene glycol (MIRALAX) packet 17 g  17 g Oral DAILY PRN      ondansetron (ZOFRAN ODT) tablet 4 mg  4 mg Oral Q8H PRN      Or    ondansetron (ZOFRAN) injection 4 mg  4 mg IntraVENous Q6H PRN      enoxaparin (LOVENOX) injection 40 mg  40 mg SubCUTAneous Q24H 40 mg at 02/03/22 1747    cefTRIAXone (ROCEPHIN) 2 g in sterile water (preservative free) 20 mL IV syringe  2 g IntraVENous Q24H 2 g at 02/03/22 2142    azithromycin (ZITHROMAX) 500 mg in 0.9% sodium chloride 250 mL (VIAL-MATE)  500 mg IntraVENous Q24H 500 mg at 02/03/22 2143       Labs/Data:    Lab Results   Component Value Date/Time    WBC 8.5 02/04/2022 03:39 AM    HGB 12.7 02/04/2022 03:39 AM    HCT 37.1 02/04/2022 03:39 AM    PLATELET 121 70/31/4699 03:39 AM    MCV 97.6 02/04/2022 03:39 AM       Lab Results   Component Value Date/Time    Sodium 129 (L) 02/04/2022 03:39 AM    Potassium 3.6 02/04/2022 03:39 AM    Chloride 96 (L) 02/04/2022 03:39 AM    CO2 28 02/04/2022 03:39 AM    Anion gap 5 02/04/2022 03:39 AM    Glucose 101 (H) 02/04/2022 03:39 AM    BUN 14 02/04/2022 03:39 AM    Creatinine 0.51 (L) 02/04/2022 03:39 AM    BUN/Creatinine ratio 27 (H) 02/04/2022 03:39 AM    GFR est AA >60 02/04/2022 03:39 AM    GFR est non-AA >60 02/04/2022 03:39 AM    Calcium 8.3 (L) 02/04/2022 03:39 AM       Wt Readings from Last 3 Encounters:   02/04/22 93.4 kg (206 lb)   02/11/20 85.7 kg (189 lb)   08/08/19 83.9 kg (185 lb)         Intake/Output Summary (Last 24 hours) at 2/4/2022 1343  Last data filed at 2/4/2022 0036  Gross per 24 hour   Intake 960 ml   Output 2 ml   Net 958 ml       Patient seen and examined. Chart reviewed. Labs, data and other pertinent notes reviewed in last 24 hrs.     PMH/SH/FH reviewed and unchanged compared to H&P    Discussed with pt      Christiano Villegas MD

## 2022-02-04 NOTE — NURSE NAVIGATOR
Chart reviewed by Heart Failure Nurse Navigator. Heart Failure database completed. EF:  pending    ACEi/ARB/ARNi: Cozaar    BB: echo pending    Aldosterone Antagonist: echo pending    Obstructive Sleep Apnea Screening: N/2   STOP-BANG score:   Referred to Sleep Medicine:     CRT not indicated     NYHA Functional Class requested via provider message on [employer]. Heart Failure Teach Back in Patient Education. Heart Failure Avoiding Triggers on Discharge Instructions. Cardiologist: **      Post discharge follow up phone call to be made within 48-72 hours of discharge.

## 2022-02-04 NOTE — PROGRESS NOTES
Prayed with Rodríguez Johnson and celebrated with her the [address].   Father Carmen Benites

## 2022-02-04 NOTE — PROGRESS NOTES
OCCUPATIONAL THERAPY EVALUATION/DISCHARGE  Patient: Sarbjit Augustin (31 y.o. female)  Date: 2/4/2022  Primary Diagnosis: Respiratory failure ([facility] [address] [J96.90]       Precautions:  Fall    ASSESSMENT  Based on the objective data described below, the patient presents with near baseline ADL performance s/p admission for respiratory failure. Patient ADLs limited by impaired cardiopulmonary endurance. At baseline, was IND and living with her . Today, patient received sitting EOB and attempting to remove lines to go to bathroom. Patient assisted with line management (currently on 3L NC). Patient supervision for functional mobility, toileting and standing at sink for hand washing. Of note, on 3 L post activity - patient SPO2 dropped to 85% - required ~30 seconds of focused PLB to recover to 92%. Patient returned to chair at end of session with call bell in reach, RN aware. Patient with no further acute OT needs, will sign off. Current Level of Function (ADLs/self-care): supervision-IND    Functional Outcome Measure: The patient scored 65/100 on the Barthel Index outcome measure which is indicative of 35% impairment in self care tasks. .      Other factors to consider for discharge: has all needed DME and good assist at home      PLAN :  Recommend with staff: Recommend with nursing, ADLs with supervision/setup, OOB to chair 3x/day and toileting via functional mobility to and from bathroom. Thank you for completing as able in order to maintain patient strength, endurance and independence. Recommendation for discharge: (in order for the patient to meet his/her long term goals)  No skilled occupational therapy/ follow up rehabilitation needs identified at this time.     This discharge recommendation:  Has been made in collaboration with the attending provider and/or case management    IF patient discharges home will need the following DME: patient owns DME required for discharge       SUBJECTIVE:   Patient stated I have everything I need and good help at home.     OBJECTIVE DATA SUMMARY:   HISTORY:   Past Medical History:   Diagnosis Date    HLD (hyperlipidemia)     HTN (hypertension)     Rheumatoid arthritis ([facility] [address]      Past Surgical History:   Procedure Laterality Date    HX APPENDECTOMY      HX BREAST BIOPSY Left     Surgical BX (Over 20yrs ago) - BENIGN     HX OTHER SURGICAL      Breast Biopsy       Prior Level of Function/Environment/Context: lives with  (brother lives with them) and was IND with ADLs, has DME but does not use it. Expanded or extensive additional review of patient history:   Home Situation  Home Environment: Private residence  # Steps to Enter: 4  Rails to Enter: Yes  Hand Rails : Bilateral  One/Two Story Residence: Two story  Living Alone: No  Support Systems: Spouse/Significant Other,Other Family Member(s) (brother)  Patient Expects to be Discharged to[de-identified] Home with family assistance (TBD/subject to change pending recommendations at this time.)  Current DME Used/Available at Home: Grab bars,Cane, straight,Walker, rolling (does not use DME at baseline)  Tub or Shower Type: Shower    Hand dominance: Right    EXAMINATION OF PERFORMANCE DEFICITS:  Cognitive/Behavioral Status:  Neurologic State: Alert  Orientation Level: Oriented X4  Cognition: Appropriate for age attention/concentration; Follows commands  Perception: Appears intact  Perseveration: No perseveration noted  Safety/Judgement: Awareness of environment;Decreased insight into deficits; Insight into deficits    Skin: appears intact    Edema: none noted in BUEs    Hearing:       Vision/Perceptual:    Tracking: Able to track stimulus in all quadrants w/o difficulty      Diplopia: No    Acuity: Within Defined Limits    Corrective Lenses: Glasses    Range of Motion:  In BUEs  AROM: Within functional limits     Strength: In BUEs  Strength:  Within functional limits     Coordination:  Coordination: Within functional limits  Fine Motor Skills-Upper: Left Intact; Right Intact    Gross Motor Skills-Upper: Left Intact; Right Intact    Tone & Sensation:  In BUEs  Tone: Normal  Sensation: Intact    Balance:  Sitting: Intact  Standing: Intact    Functional Mobility and Transfers for ADLs:  Bed Mobility:  Rolling:  (NT - recieved sitting EOB and ended in chair)    Transfers:  Sit to Stand: Supervision  Stand to Sit: Supervision  Toilet Transfer : Supervision    ADL Assessment:  Feeding: Independent    Oral Facial Hygiene/Grooming: Independent    Bathing: Supervision    Upper Body Dressing: Independent    Lower Body Dressing: Supervision    Toileting: Supervision     ADL Intervention and task modifications:     Grooming  Position Performed: Standing  Washing Hands: Independent    Lower Body Dressing Assistance  Socks: Supervision    Toileting  Toileting Assistance: Supervision  Bladder Hygiene: Supervision  Bowel Hygiene: Supervision  Clothing Management: Supervision  Adaptive Equipment: Grab bars    Cognitive Retraining  Safety/Judgement: Awareness of environment;Decreased insight into deficits; Insight into deficits    Patient educated on energy conservation techniques and verbalized understanding. Functional Measure:    Barthel Index:  Bathin  Bladder: 10  Bowels: 10  Groomin  Dressing: 10  Feeding: 10  Mobility: 0  Stairs: 0  Toilet Use: 5  Transfer (Bed to Chair and Back): 10  Total: 65/100      The Barthel ADL Index: Guidelines  1. The index should be used as a record of what a patient does, not as a record of what a patient could do. 2. The main aim is to establish degree of independence from any help, physical or verbal, however minor and for whatever reason. 3. The need for supervision renders the patient not independent. 4. A patient's performance should be established using the best available evidence. Asking the patient, friends/relatives and nurses are the usual sources, but direct observation and common sense are also important. However direct testing is not needed. 5. Usually the patient's performance over the preceding 24-48 hours is important, but occasionally longer periods will be relevant. 6. Middle categories imply that the patient supplies over 50 per cent of the effort. 7. Use of aids to be independent is allowed. Score Interpretation (from [address] [facility] [address])    Independent   60-79 Minimally independent   40-59 Partially dependent   20-39 Very dependent   <20 Totally dependent     -Leticia Chung., Barthel, DNATE. (1965). Functional evaluation: the Barthel Index. [address] ([address] (1997). The Barthel activities of daily living index: self-reporting versus actual performance in the old (> or = 75 years). Journal [address] 45(7), 14 [facility], J.SHANIF, Tressa Martinez., [facility]. (1999). Measuring the change in disability after inpatient rehabilitation; comparison of the responsiveness of the Barthel Index and Functional [place] Measure. Journal of Neurology, Neurosurgery, and Psychiatry, 66(4), 931-212. Govind Norman, N.J.A, CHANTE Hilton, & Gerson Perea, M.A. (2004) Assessment of post-stroke quality of life in cost-effectiveness studies: The usefulness of the Barthel Index and the EuroQoL-5D.  Quality of Life Research, 15, 682-79       Occupational Therapy Evaluation Charge Determination   History Examination Decision-Making   LOW Complexity : Brief history review  LOW Complexity : 1-3 performance deficits relating to physical, cognitive , or psychosocial skils that result in activity limitations and / or participation restrictions  LOW Complexity : No comorbidities that affect functional and no verbal or physical assistance needed to complete eval tasks       Based on the above components, the patient evaluation is determined to be of the following complexity level: LOW   Pain Rating:  Reporting no pain    Activity Tolerance:   Good, desaturates with exertion and requires oxygen, requires rest breaks and observed SOB with activity    After treatment patient left in no apparent distress:    Sitting in chair, Call bell within reach and RN aware    COMMUNICATION/EDUCATION:   The patients plan of care was discussed with: Physical therapist and Registered nurse.      Thank you for this referral.  Magalys Martinez OT  Time Calculation: 13 mins

## 2022-02-04 NOTE — PROGRESS NOTES
PHYSICAL THERAPY EVALUATION/DISCHARGE  Patient: Dell Cabrera (74 y.o. female)  Date: 2/4/2022  Primary Diagnosis: Respiratory failure ([facility] [address] [J96.90]       Precautions:   Fall      ASSESSMENT  Based on the objective data described below, the patient presents with reports of SOB, CHF. Pt received seated in the chair on 5L of oxygen and agreeable to therapy. Pt tolerated session well. Pt completed transfers without AD and tolerated gait training x 75 ft with supervision. Pt desaturated to 85% on 5L of oxygen post gait training and required ~1 minute to recover back to 93%. Pt appears to be mobilizing at baseline mobility status, but is requiring 5L of oxygen at this time (pt did not wear oxygen at baseline). Pt does not require any further acute PT needs. Recommend mobilization with RN staff to prevent further deconditioning during remaining hospital stay . Other factors to consider for discharge: not on home oxygen at baseline     Further skilled acute physical therapy is not indicated at this time. PLAN :  Recommendation for discharge: (in order for the patient to meet his/her long term goals)  No skilled physical therapy/ follow up rehabilitation needs identified at this time. This discharge recommendation:  Has been made in collaboration with the attending provider and/or case management    IF patient discharges home will need the following DME: ? Oxygen       SUBJECTIVE:   Patient stated I just want to get off this oxygen! Raquel Corpus    OBJECTIVE DATA SUMMARY:   HISTORY:    Past Medical History:   Diagnosis Date    HLD (hyperlipidemia)     HTN (hypertension)     Rheumatoid arthritis ([facility] [address]      Past Surgical History:   Procedure Laterality Date    HX APPENDECTOMY      HX BREAST BIOPSY Left     Surgical BX (Over 20yrs ago) - BENIGN     HX OTHER SURGICAL      Breast Biopsy       Prior level of function: independent, ambulatory without AD, lives with spouse and brother  Personal factors and/or comorbidities impacting plan of care:     Home Situation  Home Environment: Private residence  # Steps to Enter: 4  Rails to Enter: Yes  Hand Rails : Bilateral  One/Two Story Residence: Two story  Living Alone: No  Support Systems: Spouse/Significant Other,Other Family Member(s) (brother)  Patient Expects to be Discharged to[de-identified] Home with family assistance (TBD/subject to change pending recommendations at this time.)  Current DME Used/Available at Home: Grab bars,Cane, straight,Walker, rolling (does not use DME at baseline)  Tub or Shower Type: Shower    EXAMINATION/PRESENTATION/DECISION MAKING:   Critical Behavior:  Neurologic State: Alert  Orientation Level: Oriented X4  Cognition: Appropriate for age attention/concentration,Follows commands  Safety/Judgement: Awareness of environment,Decreased insight into deficits,Insight into deficits  Hearing:       Range Of Motion:  AROM: Within functional limits                       Strength:    Strength:  Within functional limits                    Tone & Sensation:   Tone: Normal              Sensation: Intact               Coordination:  Coordination: Within functional limits  Vision:   Tracking: Able to track stimulus in all quadrants w/o difficulty  Diplopia: No  Acuity: Within Defined Limits  Corrective Lenses: Glasses  Functional Mobility:  Bed Mobility:  Rolling:  (NT - recieved sitting EOB and ended in chair)           Transfers:  Sit to Stand: Supervision  Stand to Sit: Supervision                       Balance:   Sitting: Intact  Standing: Intact  Ambulation/Gait Training:  Distance (ft): 75 Feet (ft)  Assistive Device: Gait belt  Ambulation - Level of Assistance: Supervision        Gait Abnormalities: Decreased step clearance        Base of Support: Narrowed     Speed/Juanis: Pace decreased (<100 feet/min)  Step Length: Right shortened;Left shortened              Functional Measure:  Barthel Index:    Bathin  Bladder: 10  Bowels: 10  Groomin  Dressing: 10  Feeding: 10  Mobility: 0  Stairs: 0  Toilet Use: 5  Transfer (Bed to Chair and Back): 10  Total: 65/100       The Barthel ADL Index: Guidelines  1. The index should be used as a record of what a patient does, not as a record of what a patient could do. 2. The main aim is to establish degree of independence from any help, physical or verbal, however minor and for whatever reason. 3. The need for supervision renders the patient not independent. 4. A patient's performance should be established using the best available evidence. Asking the patient, friends/relatives and nurses are the usual sources, but direct observation and common sense are also important. However direct testing is not needed. 5. Usually the patient's performance over the preceding 24-48 hours is important, but occasionally longer periods will be relevant. 6. Middle categories imply that the patient supplies over 50 per cent of the effort. 7. Use of aids to be independent is allowed. Score Interpretation (from [address] [address])    Independent   60-79 Minimally independent   40-59 Partially dependent   20-39 Very dependent   <20 Totally dependent     -Leticia Chung., Barthel, D.W. (1965). Functional evaluation: the Barthel Index. [address] [facility] ([address] (1997). The Barthel activities of daily living index: self-reporting versus actual performance in the old (> or = 75 years). Journal of [address] [place] [address] 45(7), 14 [facility], .[facility].F, Ryan Garrdio., Dionna Mcdonald. (1999). Measuring the change in disability after inpatient rehabilitation; comparison of the responsiveness of the Barthel Index and Functional [place] Measure. Journal of Neurology, Neurosurgery, and Psychiatry, 66(4), 280-454. Vinay Cano, N.J.A, BARTOLOME [email or web address], & Maritza Strong, MKavitaA. (2004) Assessment of post-stroke quality of life in cost-effectiveness studies:  The usefulness of the Barthel Index and the EuroQoL-5D. Quality of Life Research, 13, 826-93          Based on the above components, the patient evaluation is determined to be of the following complexity level: LOW     Pain Rating:  Pt denied pain    Activity Tolerance:   Fair and desaturates with exertion and requires oxygen      After treatment patient left in no apparent distress:   Sitting in chair, Call bell within reach and Side rails x 3    COMMUNICATION/EDUCATION:   The patients plan of care was discussed with: Registered nurse. Fall prevention education was provided and the patient/caregiver indicated understanding., Patient/family have participated as able in goal setting and plan of care. and Patient/family agree to work toward stated goals and plan of care.     Thank you for this referral.  Tr Smart, PT, DPT   Time Calculation: 20 mins

## 2022-02-04 NOTE — PROGRESS NOTES
Orders received, chart reviewed and patient evaluated by occupational therapy. Pending progression with skilled acute occupational therapy, recommend:  No skilled occupational therapy/ follow up rehabilitation needs identified at this time. Recommend with nursing ADLs with supervision/setup, OOB to chair 3x/day and toileting via functional mobility to and from bathroom with supervision. Thank you for completing as able in order to maintain patient strength, endurance and independence. Full evaluation to follow.

## 2022-02-04 NOTE — PROGRESS NOTES
[address] [facility]  Hospitalist Group                                                                                          Hospitalist Progress Note  Christiano Vega DO  Answering service: [phone] from in house phone        Date of Service:  2022  NAME:  Rowan Webb  :  1950  MRN:  [id number]      Admission Summary:   Rowan Webb is a 70 y.o. female with pmh of HTN, Rheumatoid arthritis on MTX, and immunosuppresants presents with 3 days of worsening SOB. Patient was seen by her primary care physician today for complaints of shortness of breath, was noted to be hypoxic to the 80s on room air and sent to the emergency department for further evaluation, and concern for COVID-19. Patient has been vaccinated x3, however notably is immunocompromised with rheumatoid arthritis. Patient denies any fevers at home, she notes that she has been weak, increase shortness of breath. She is unable to barely walk up steps or even walk a few blocks without having to stop and catch her breath. She denied any lower extremity edema, para nocturnal dyspnea, orthopnea. But she does use 2 pillows to sleep at night, however is always done so. She does have a dry non productive cough. Denies any palpitations, chest pain, nausea vomiting or diarrhea. No musculoskeletal pain. Of note she does have a h/o pleural effusion which had to be drained multiple times (and cause was uncertain), she has since always had a \"tiny small one\" which never went away.     On arrival to the emergency department patient was found to be desaturating to the 80s on room air, placed on 2 L with improvement. Chest x-ray with bilateral infiltrates. COVID-19 PCR done and negative. Patient referred for admission to the hospitalist.    Chest CTA was done with evidence of pneumonia negative for PE       Interval history / Subjective:   Patient was seen and examined this morning. She denies any new complaints.   She remains on oxygen therapy at 2-4 L. Denies fever, chills, chest pain, abdominal pain, nausea, vomiting and diarrhea. Overnight events reported by nursing staff. Assessment & Plan:     Acute hypoxic respiratory failure - requiring 2-4L secondary to below  Acute CHF, unknown EF, pulmonary edema  -Increased Lasix 40mg daily, monitor I&O, fluid restriction   -LVEF 55 to 60% on echocardiogram      Community-acquired pneumonia:  -Continue antibiotic therapy with Rocephin/azithromycin  -Encourage incentive spirometer use  -Patient will need home O2 eval prior to DC     Hyponatremia, possibly SIADH  - Monitor BMP   -Nephrology following closely with recommendations to restrict oral fluid intake     HTN -continue losartan 100 mg daily    RA - on MTX and leukovorin, q8week golimumab, as well as qweek denosumab . Hold for now given unclear if pt has active infection. Further recommendations pending clinical course    PT/OT       CODE STATUS: Full Code   DVT PROPHYLAXIS: Lovenox  FUNCTIONAL STATUS PRIOR TO HOSPITALIZATION: Fully active and ambulatory; able to carry on all self-care without restriction. EARLY MOBILITY ASSESSMENT: Recommend routine ambulation while hospitalized with the assistance of nursing staff  ANTICIPATED DISCHARGE: 24-48 hours. EMERGENCY CONTACT/SURROGATE DECISION MAKER: Manisha          Hospital Problems  Date Reviewed: 4/17/2018          Codes Class Noted POA    Respiratory failure [facility]) ICD-10-CM: J96.90  ICD-9-CM: 518.81  2/2/2022 Unknown                Review of Systems:   A comprehensive review of systems was negative except for that written in the HPI. Vital Signs:    Last 24hrs VS reviewed since prior progress note.  Most recent are:  Visit Vitals  BP (!) 156/88 (BP 1 Location: Right upper arm, BP Patient Position: At rest)   Pulse (!) 113   Temp 100 °F (37.8 °C)   Resp 22   Ht 5' 5\" (1.651 m)   Wt 93.4 kg (206 lb)   SpO2 95%   BMI 34.28 kg/m²         Intake/Output Summary (Last 24 hours) at 2/4/2022 1747  Last data filed at 2/4/2022 0036  Gross per 24 hour   Intake 520 ml   Output    Net 520 ml        Physical Examination:     I had a face to face encounter with this patient and independently examined them on 2/4/2022 as outlined below:          Constitutional:  No acute distress, cooperative, pleasant    ENT:  Oral mucosa moist, oropharynx benign. Resp:  Decreased breath sounds bilaterally, hypoxia, on oxygen, rales   CV:  Regular rhythm, normal rate, no murmurs, gallops, rubs    GI:  Soft, non distended, non tender. normoactive bowel sounds, no hepatosplenomegaly     Musculoskeletal:  1+ pitting edema in the lower extremities, warm, 2+ pulses throughout    Neurologic:  Moves all extremities. AAOx3, CN II-XII reviewed            Data Review:    Review and/or order of clinical lab test      Labs:     Recent Labs     02/04/22  0339 02/03/22  0234   WBC 8.5 6.1   HGB 12.7 12.7   HCT 37.1 37.6    313     Recent Labs     02/04/22  0339 02/03/22  0056 02/02/22  1043   * 128* 129*   K 3.6 3.9 3.7   CL 96* 97 96*   CO2 28 25 27   BUN 14 10 13   CREA 0.51* 0.63 0.61   * 153* 101*   CA 8.3* 8.5 8.8   MG 2.3 2.3  --    PHOS  --  3.6  --    URICA 3.0  --   --      Recent Labs     02/04/22  0339 02/02/22  1043   ALT 37 29   AP 58 66   TBILI 0.5 0.5   TP 6.6 6.6   ALB 2.3* 2.5*   GLOB 4.3* 4.1*     No results for input(s): INR, PTP, APTT, INREXT, INREXT in the last 72 hours. No results for input(s): FE, TIBC, PSAT, FERR in the last 72 hours. No results found for: FOL, RBCF   Recent Labs     02/02/22  1621   PH 7.42   PCO2 40   PO2 82     No results for input(s): CPK, CKNDX, TROIQ in the last 72 hours.     No lab exists for component: CPKMB  No results found for: CHOL, CHOLX, CHLST, CHOLV, HDL, HDLP, LDL, LDLC, DLDLP, TGLX, TRIGL, TRIGP, CHHD, CHHDX  No results found for: GLUCPOC  No results found for: COLOR, APPRN, SPGRU, REFSG, MANE, PROTU, GLUCU, KETU, BILU, UROU, NIMA, LEUKU, GLUKE, EPSU, BACTU, WBCU, RBCU, CASTS, UCRY      Medications Reviewed:     Current Facility-Administered Medications   Medication Dose Route Frequency    guaiFENesin ER (MUCINEX) tablet 600 mg  600 mg Oral Q12H    albuterol-ipratropium (DUO-NEB) 2.5 MG-0.5 MG/3 ML  3 mL Nebulization Q6H PRN    furosemide (LASIX) injection 40 mg  40 mg IntraVENous DAILY    fluticasone propionate (FLONASE) 50 mcg/actuation nasal spray 2 Spray  2 Spray Both Nostrils DAILY    sodium chloride (OCEAN) 0.65 % nasal squeeze bottle 2 Spray  2 Spray Both Nostrils Q2H PRN    losartan (COZAAR) tablet 100 mg  100 mg Oral DAILY    sodium chloride (NS) flush 5-40 mL  5-40 mL IntraVENous Q8H    sodium chloride (NS) flush 5-40 mL  5-40 mL IntraVENous PRN    acetaminophen (TYLENOL) tablet 650 mg  650 mg Oral Q6H PRN    Or    acetaminophen (TYLENOL) suppository 650 mg  650 mg Rectal Q6H PRN    polyethylene glycol (MIRALAX) packet 17 g  17 g Oral DAILY PRN    ondansetron (ZOFRAN ODT) tablet 4 mg  4 mg Oral Q8H PRN    Or    ondansetron (ZOFRAN) injection 4 mg  4 mg IntraVENous Q6H PRN    enoxaparin (LOVENOX) injection 40 mg  40 mg SubCUTAneous Q24H    cefTRIAXone (ROCEPHIN) 2 g in sterile water (preservative free) 20 mL IV syringe  2 g IntraVENous Q24H    azithromycin (ZITHROMAX) 500 mg in 0.9% sodium chloride 250 mL (VIAL-MATE)  500 mg IntraVENous Q24H     ______________________________________________________________________  EXPECTED LENGTH OF STAY: 4d 2h  ACTUAL LENGTH OF STAY:          2                 Saqib Jean-Baptiste DO

## 2022-02-05 LAB
ALBUMIN SERPL-MCNC: 2.4 G/DL (ref 3.5–5)
ALBUMIN/GLOB SERPL: 0.7 {RATIO} (ref 1.1–2.2)
ALP SERPL-CCNC: 59 U/L (ref 45–117)
ALT SERPL-CCNC: 41 U/L (ref 12–78)
ANION GAP SERPL CALC-SCNC: 6 MMOL/L (ref 5–15)
AST SERPL-CCNC: 46 U/L (ref 15–37)
BASOPHILS # BLD: 0 K/UL (ref 0–0.1)
BASOPHILS NFR BLD: 0 % (ref 0–1)
BILIRUB SERPL-MCNC: 0.7 MG/DL (ref 0.2–1)
BUN SERPL-MCNC: 11 MG/DL (ref 6–20)
BUN/CREAT SERPL: 31 (ref 12–20)
CALCIUM SERPL-MCNC: 8 MG/DL (ref 8.5–10.1)
CHLORIDE SERPL-SCNC: 93 MMOL/L (ref 97–108)
CO2 SERPL-SCNC: 27 MMOL/L (ref 21–32)
CREAT SERPL-MCNC: 0.36 MG/DL (ref 0.55–1.02)
DIFFERENTIAL METHOD BLD: ABNORMAL
EOSINOPHIL # BLD: 0.2 K/UL (ref 0–0.4)
EOSINOPHIL NFR BLD: 2 % (ref 0–7)
ERYTHROCYTE [DISTWIDTH] IN BLOOD BY AUTOMATED COUNT: 12 % (ref 11.5–14.5)
GLOBULIN SER CALC-MCNC: 3.4 G/DL (ref 2–4)
GLUCOSE SERPL-MCNC: 104 MG/DL (ref 65–100)
HCT VFR BLD AUTO: 34.6 % (ref 35–47)
HGB BLD-MCNC: 11.9 G/DL (ref 11.5–16)
IMM GRANULOCYTES # BLD AUTO: 0 K/UL (ref 0–0.04)
IMM GRANULOCYTES NFR BLD AUTO: 0 % (ref 0–0.5)
LYMPHOCYTES # BLD: 1.3 K/UL (ref 0.8–3.5)
LYMPHOCYTES NFR BLD: 14 % (ref 12–49)
MCH RBC QN AUTO: 33.1 PG (ref 26–34)
MCHC RBC AUTO-ENTMCNC: 34.4 G/DL (ref 30–36.5)
MCV RBC AUTO: 96.1 FL (ref 80–99)
MONOCYTES # BLD: 1 K/UL (ref 0–1)
MONOCYTES NFR BLD: 10 % (ref 5–13)
NEUTS SEG # BLD: 7.2 K/UL (ref 1.8–8)
NEUTS SEG NFR BLD: 74 % (ref 32–75)
NRBC # BLD: 0 K/UL (ref 0–0.01)
NRBC BLD-RTO: 0 PER 100 WBC
PHOSPHATE SERPL-MCNC: 2.6 MG/DL (ref 2.6–4.7)
PLATELET # BLD AUTO: 364 K/UL (ref 150–400)
PMV BLD AUTO: 9.2 FL (ref 8.9–12.9)
POTASSIUM SERPL-SCNC: 4.1 MMOL/L (ref 3.5–5.1)
PROT SERPL-MCNC: 5.8 G/DL (ref 6.4–8.2)
RBC # BLD AUTO: 3.6 M/UL (ref 3.8–5.2)
SODIUM SERPL-SCNC: 126 MMOL/L (ref 136–145)
WBC # BLD AUTO: 9.7 K/UL (ref 3.6–11)

## 2022-02-05 PROCEDURE — 84100 ASSAY OF PHOSPHORUS: CPT

## 2022-02-05 PROCEDURE — 87070 CULTURE OTHR SPECIMN AEROBIC: CPT

## 2022-02-05 PROCEDURE — 74011250637 HC RX REV CODE- 250/637: Performed by: NURSE PRACTITIONER

## 2022-02-05 PROCEDURE — 74011250636 HC RX REV CODE- 250/636: Performed by: INTERNAL MEDICINE

## 2022-02-05 PROCEDURE — 36415 COLL VENOUS BLD VENIPUNCTURE: CPT

## 2022-02-05 PROCEDURE — 80053 COMPREHEN METABOLIC PANEL: CPT

## 2022-02-05 PROCEDURE — 74011250636 HC RX REV CODE- 250/636: Performed by: STUDENT IN AN ORGANIZED HEALTH CARE EDUCATION/TRAINING PROGRAM

## 2022-02-05 PROCEDURE — 74011000250 HC RX REV CODE- 250: Performed by: NURSE PRACTITIONER

## 2022-02-05 PROCEDURE — 65660000000 HC RM CCU STEPDOWN

## 2022-02-05 PROCEDURE — 85025 COMPLETE CBC W/AUTO DIFF WBC: CPT

## 2022-02-05 PROCEDURE — 74011250636 HC RX REV CODE- 250/636: Performed by: NURSE PRACTITIONER

## 2022-02-05 PROCEDURE — 74011250637 HC RX REV CODE- 250/637: Performed by: INTERNAL MEDICINE

## 2022-02-05 PROCEDURE — 74011636637 HC RX REV CODE- 636/637: Performed by: INTERNAL MEDICINE

## 2022-02-05 PROCEDURE — 74011000250 HC RX REV CODE- 250: Performed by: INTERNAL MEDICINE

## 2022-02-05 RX ORDER — PREDNISONE 20 MG/1
20 TABLET ORAL 2 TIMES DAILY WITH MEALS
Status: DISCONTINUED | OUTPATIENT
Start: 2022-02-05 | End: 2022-02-08 | Stop reason: HOSPADM

## 2022-02-05 RX ADMIN — GUAIFENESIN 600 MG: 600 TABLET, EXTENDED RELEASE ORAL at 22:01

## 2022-02-05 RX ADMIN — SODIUM CHLORIDE, PRESERVATIVE FREE 10 ML: 5 INJECTION INTRAVENOUS at 06:00

## 2022-02-05 RX ADMIN — FUROSEMIDE 40 MG: 10 INJECTION, SOLUTION INTRAMUSCULAR; INTRAVENOUS at 08:57

## 2022-02-05 RX ADMIN — GUAIFENESIN 600 MG: 600 TABLET, EXTENDED RELEASE ORAL at 08:57

## 2022-02-05 RX ADMIN — SODIUM CHLORIDE, PRESERVATIVE FREE 10 ML: 5 INJECTION INTRAVENOUS at 14:31

## 2022-02-05 RX ADMIN — PREDNISONE 20 MG: 20 TABLET ORAL at 16:41

## 2022-02-05 RX ADMIN — LOSARTAN POTASSIUM 100 MG: 50 TABLET, FILM COATED ORAL at 08:57

## 2022-02-05 RX ADMIN — SODIUM CHLORIDE, PRESERVATIVE FREE 10 ML: 5 INJECTION INTRAVENOUS at 22:01

## 2022-02-05 RX ADMIN — ENOXAPARIN SODIUM 40 MG: 100 INJECTION SUBCUTANEOUS at 17:12

## 2022-02-05 RX ADMIN — WATER 2 G: 1 INJECTION INTRAMUSCULAR; INTRAVENOUS; SUBCUTANEOUS at 22:00

## 2022-02-05 RX ADMIN — ACETAMINOPHEN 650 MG: 325 TABLET ORAL at 05:23

## 2022-02-05 RX ADMIN — ACETAMINOPHEN 650 MG: 325 TABLET ORAL at 17:12

## 2022-02-05 RX ADMIN — AZITHROMYCIN MONOHYDRATE 500 MG: 500 INJECTION, POWDER, LYOPHILIZED, FOR SOLUTION INTRAVENOUS at 22:00

## 2022-02-05 RX ADMIN — FLUTICASONE PROPIONATE 2 SPRAY: 50 SPRAY, METERED NASAL at 09:00

## 2022-02-05 NOTE — PROGRESS NOTES
[address] [facility] Adult  Hospitalist Group                                                                                          Hospitalist Progress Note  Mariusz Bang MD  Answering service: [phone] OR [phone] from in house phone        Date of Service:  2022  NAME:  Ayaan Rivera  :  1950  MRN:  [id number]      Admission Summary:   Ayaan Rivera is a 70 y.o. female with pmh of HTN, Rheumatoid arthritis on MTX, and immunosuppresants presents with 3 days of worsening SOB. Patient was seen by her primary care physician today for complaints of shortness of breath, was noted to be hypoxic to the 80s on room air and sent to the emergency department for further evaluation, and concern for COVID-19. Patient has been vaccinated x3, however notably is immunocompromised with rheumatoid arthritis. Patient denies any fevers at home, she notes that she has been weak, increase shortness of breath. She is unable to barely walk up steps or even walk a few blocks without having to stop and catch her breath. She denied any lower extremity edema, para nocturnal dyspnea, orthopnea. But she does use 2 pillows to sleep at night, however is always done so. She does have a dry non productive cough. Denies any palpitations, chest pain, nausea vomiting or diarrhea. No musculoskeletal pain. Of note she does have a h/o pleural effusion which had to be drained multiple times (and cause was uncertain), she has since always had a \"tiny small one\" which never went away.     On arrival to the emergency department patient was found to be desaturating to the 80s on room air, placed on 2 L with improvement. Chest x-ray with bilateral infiltrates. COVID-19 PCR done and negative. Patient referred for admission to the hospitalist.    Chest CTA was done with evidence of pneumonia negative for PE       Interval history / Subjective:   Patient was seen and examined this morning.     Patient still coughing and has shortness of breath. She is needing 6 L of oxygen.  at bedside. Assessment & Plan:     Acute hypoxic respiratory failure - requiring 2-4L secondary to below. And now 6 L/min  CT of the chest images were reviewed. I did not see any evidence of lobar pneumonia. Or evidence of CHF. There is bilateral airspace disease. She is currently on ceftriaxone and azithromycin. On IV Lasix presumably treating CHF but her cardiac work-up is negative  -LVEF 55 to 60% on echocardiogram, and no evidence of diastolic dysfunction. She is Covid PCR negative, flu negative  With history of rheumatoid arthritis in the past treated with methotrexate, leucovorin and golimumab   I am not very clear at this point of time etiology of her hypoxia, CRP is 13.10, and with a negative pro calcitonin I am wondering if she has an autoimmune lung disorder. I would order ESR. Pulmonology consult. Hold onto treatment with steroids for now  Patient admits to smoking but quit 3 to 4 years ago     Hyponatremia, possibly SIADH  - Monitor BMP    -Nephrology following closely with recommendations to restrict oral fluid intake   Avoid SSRI, NSAIDs, thiazides.     HTN -continue losartan 100 mg daily    RA - on MTX and leukovorin, q8week golimumab, as well as qweek denosumab . Hold for now given unclear if pt has active infection. Further recommendations pending clinical course    PT/OT       CODE STATUS: Full Code   DVT PROPHYLAXIS: Lovenox  FUNCTIONAL STATUS PRIOR TO HOSPITALIZATION: Fully active and ambulatory; able to carry on all self-care without restriction. EARLY MOBILITY ASSESSMENT: Recommend routine ambulation while hospitalized with the assistance of nursing staff  ANTICIPATED DISCHARGE: 24-48 hours.   EMERGENCY CONTACT/SURROGATE DECISION MAKER:           Hospital Problems  Date Reviewed: 4/17/2018          Codes Class Noted POA    Respiratory failure ([facility] [address] ICD-10-CM: J96.90  ICD-9-CM: 518.81  2/2/2022 Unknown Review of Systems:   A comprehensive review of systems was negative except for that written in the HPI. Vital Signs:    Last 24hrs VS reviewed since prior progress note. Most recent are:  Visit Vitals  BP (!) 153/80 (BP 1 Location: Right upper arm, BP Patient Position: At rest)   Pulse (!) 106   Temp 97.9 °F (36.6 °C)   Resp 17   Ht 5' 5\" (1.651 m)   Wt 93.5 kg (206 lb 3.2 oz)   SpO2 97%   BMI 34.31 kg/m²         Intake/Output Summary (Last 24 hours) at 2/5/2022 1405  Last data filed at 2/5/2022 8572  Gross per 24 hour   Intake 270 ml   Output    Net 270 ml        Physical Examination:     I had a face to face encounter with this patient and independently examined them on 2/5/2022 as outlined below:          Constitutional:  No acute distress, cooperative, pleasant    ENT:  Oral mucosa moist, oropharynx benign. Resp:  Crackles bilateral lower lobes   CV:  Regular rhythm, normal rate, no murmurs, gallops, rubs    GI:  Soft, non distended, non tender. normoactive bowel sounds, no hepatosplenomegaly     Musculoskeletal:  1+ pitting edema in the lower extremities, warm, 2+ pulses throughout    Neurologic:  Moves all extremities. AAOx3, CN II-XII reviewed            Data Review:    Review and/or order of clinical lab test      Labs:     Recent Labs     02/05/22 0645 02/04/22 0339   WBC 9.7 8.5   HGB 11.9 12.7   HCT 34.6* 37.1    346     Recent Labs     02/05/22 0645 02/04/22 0339 02/03/22  0056   * 129* 128*   K 4.1 3.6 3.9   CL 93* 96* 97   CO2 27 28 25   BUN 11 14 10   CREA 0.36* 0.51* 0.63   * 101* 153*   CA 8.0* 8.3* 8.5   MG  --  2.3 2.3   PHOS 2.6  --  3.6   URICA  --  3.0  --      Recent Labs     02/05/22  0645 02/04/22  0339   ALT 41 37   AP 59 58   TBILI 0.7 0.5   TP 5.8* 6.6   ALB 2.4* 2.3*   GLOB 3.4 4.3*     No results for input(s): INR, PTP, APTT, INREXT, INREXT in the last 72 hours. No results for input(s): FE, TIBC, PSAT, FERR in the last 72 hours.    No results found for: FOL, RBCF   Recent Labs     02/02/22  1621   PH 7.42   PCO2 40   PO2 82     No results for input(s): CPK, CKNDX, TROIQ in the last 72 hours.     No lab exists for component: CPKMB  No results found for: CHOL, CHOLX, CHLST, CHOLV, HDL, HDLP, LDL, LDLC, DLDLP, TGLX, TRIGL, TRIGP, CHHD, CHHDX  No results found for: GLUCPOC  No results found for: COLOR, APPRN, SPGRU, REFSG, MANE, PROTU, GLUCU, KETU, BILU, UROU, NIMA, LEUKU, GLUKE, EPSU, BACTU, WBCU, RBCU, CASTS, UCRY      Medications Reviewed:     Current Facility-Administered Medications   Medication Dose Route Frequency    urea (URE-NA) 15 gram packet 2 Packet  2 Packet Oral DAILY    guaiFENesin ER (MUCINEX) tablet 600 mg  600 mg Oral Q12H    albuterol-ipratropium (DUO-NEB) 2.5 MG-0.5 MG/3 ML  3 mL Nebulization Q6H PRN    furosemide (LASIX) injection 40 mg  40 mg IntraVENous DAILY    fluticasone propionate (FLONASE) 50 mcg/actuation nasal spray 2 Spray  2 Spray Both Nostrils DAILY    sodium chloride (OCEAN) 0.65 % nasal squeeze bottle 2 Spray  2 Spray Both Nostrils Q2H PRN    losartan (COZAAR) tablet 100 mg  100 mg Oral DAILY    sodium chloride (NS) flush 5-40 mL  5-40 mL IntraVENous Q8H    sodium chloride (NS) flush 5-40 mL  5-40 mL IntraVENous PRN    acetaminophen (TYLENOL) tablet 650 mg  650 mg Oral Q6H PRN    Or    acetaminophen (TYLENOL) suppository 650 mg  650 mg Rectal Q6H PRN    polyethylene glycol (MIRALAX) packet 17 g  17 g Oral DAILY PRN    ondansetron (ZOFRAN ODT) tablet 4 mg  4 mg Oral Q8H PRN    Or    ondansetron (ZOFRAN) injection 4 mg  4 mg IntraVENous Q6H PRN    enoxaparin (LOVENOX) injection 40 mg  40 mg SubCUTAneous Q24H    cefTRIAXone (ROCEPHIN) 2 g in sterile water (preservative free) 20 mL IV syringe  2 g IntraVENous Q24H    azithromycin (ZITHROMAX) 500 mg in 0.9% sodium chloride 250 mL (VIAL-MATE)  500 mg IntraVENous Q24H     ______________________________________________________________________  EXPECTED LENGTH OF STAY: 4d 2h  ACTUAL LENGTH OF STAY:          3                 Carlitos Stahl MD

## 2022-02-05 NOTE — CONSULTS
Pulmonary, Critical Care, and Sleep Medicine    Initial Patient Consult    Name: Nely Pickett MRN: [id number]   : 1950 Hospital: [facility]    Date: 2022        IMPRESSION:   · At Risk for ILD from Rheumatoid and from Mtx related lung disease. No overt infection. · Dyspnea on Exertion for several days. · Acute Hypoxic Respiratory Failure: Hypoxia, noted to have exertional hypoxia. Noted at PCP. Pox was 89% on RA at PCP office. Was noted to have pox of 92% on RA here. · Chronic Left Pleural effusion, multiple prior thoracentesis. · Rheumatoid Arthritis and on Mtx. · Immunocompromised. · Had Covid Vaccine and booster. · Procalcitonin level is low, does not suggest bacterial infection. · Noted to have hypertension, 168/114 on arrival.   · Covid PCR here was negative. · Anemia: hgb of 11.9  · Hyponatremia  · Increased CRP  · Allergy to Iodine. · Ex Smoker: quit 3-4 year ago. RECOMMENDATIONS:   · Discussed with pt, Will place on Prednisone for possible Auto immune or Drug induced lung disease. · Will need to get PFTs as an outpt for baseline. · Agreed with Zithromax. · Diurese as able  · Wean oxygen to keep pox over 90%  · Will follow. PCP: Dr. Sudeep Jiang    Subjective:   Cc: Dyspnea on Exertion, Hypoxia. HPI:   This patient has been seen and evaluated at the request of Dr. Jay Marina for above. Patient is a 70 y.o. female who was admitted on 2022 for worsening shortness of breath. Noted to have hypoxia with exertion. Pt has hx of Rheumatoid Arthritis. ON Mtx. Had prior Covid Vaccines. Denies fevers. Has generalized weakness. Increasing shortness of breath. Worsening AL. Has a dry cough worse for the last 3 weeks. Had significant dyspnea with climbing a flight of stairs about 1 week prior. NO issues with fevers, chills, sweats. NO sinus issues. NO hemoptysis. NO GERD or aspiration. No joint pain or swelling. Otherwise had been feeling near baseline. Was noted to have crackles in all lung fields. Past Medical History:   Diagnosis Date    HLD (hyperlipidemia)     HTN (hypertension)     Rheumatoid arthritis ([facility] [address]       Past Surgical History:   Procedure Laterality Date    HX APPENDECTOMY      HX BREAST BIOPSY Left     Surgical BX (Over 20yrs ago) - BENIGN     HX OTHER SURGICAL      Breast Biopsy      Prior to Admission medications    Medication Sig Start Date End Date Taking? Authorizing Provider   telmisartan (MICARDIS) 80 mg tablet Take 80 mg by mouth daily. 21  Yes Provider, Historical   leucovorin calcium (WELLCOVORIN) 5 mg tablet TK 1  T  PO  FOR  3  DAYS  BEGINNING  THE  DAY  AFTER  METHOTREXATE  DOSE 3/28/18   Provider, Historical   methotrexate (RHEUMATREX) 2.5 mg tablet TK  5  TS  PO  Q  WEEK  ON  THE  SAME  DAY 18   Provider, Historical   predniSONE (DELTASONE) 10 mg tablet  18   Provider, Historical   ibuprofen (MOTRIN) 600 mg tablet Take 600 mg by mouth daily. Provider, Historical   FOLIC ACID/MULTIVIT-MIN/LUTEIN (CENTRUM SILVER PO) Take 1 Tab by mouth daily. Provider, Historical   vitamin a-vitamin c-vit e-min (OCUVITE) tablet Take 1 Tab by mouth daily. Provider, Historical     Allergies   Allergen Reactions    Iodine Rash      Social History     Tobacco Use    Smoking status: Former Smoker     Packs/day: 1.00     Years: 40.00     Pack years: 40.00     Types: Cigarettes     Quit date: 10/26/2016     Years since quittin.2    Smokeless tobacco: Never Used   Substance Use Topics    Alcohol use: Yes      History reviewed. No pertinent family history.      Current Facility-Administered Medications   Medication Dose Route Frequency    urea (URE-NA) 15 gram packet 2 Packet  2 Packet Oral DAILY    guaiFENesin ER (MUCINEX) tablet 600 mg  600 mg Oral Q12H    furosemide (LASIX) injection 40 mg  40 mg IntraVENous DAILY    fluticasone propionate (FLONASE) 50 mcg/actuation nasal spray 2 Spray  2 Spray Both Nostrils DAILY    losartan (COZAAR) tablet 100 mg  100 mg Oral DAILY    sodium chloride (NS) flush 5-40 mL  5-40 mL IntraVENous Q8H    enoxaparin (LOVENOX) injection 40 mg  40 mg SubCUTAneous Q24H    cefTRIAXone (ROCEPHIN) 2 g in sterile water (preservative free) 20 mL IV syringe  2 g IntraVENous Q24H    azithromycin (ZITHROMAX) 500 mg in 0.9% sodium chloride 250 mL (VIAL-MATE)  500 mg IntraVENous Q24H       Review of Systems:  Constitutional: positive for fatigue  Eyes: negative  Ears, nose, mouth, throat, and face: negative  Respiratory: positive for cough, wheezing or dyspnea on exertion  Cardiovascular: negative  Gastrointestinal: negative  Genitourinary:negative  Integument/breast: negative  Hematologic/lymphatic: negative  Musculoskeletal:negative  Neurological: negative  Behavioral/Psych: negative  Endocrine: negative  Allergic/Immunologic: negative    Objective:   Vital Signs:    Visit Vitals  BP (!) 153/80 (BP 1 Location: Right upper arm, BP Patient Position: At rest)   Pulse (!) 106   Temp 97.9 °F (36.6 °C)   Resp 17   Ht 5' 5\" (1.651 m)   Wt 93.5 kg (206 lb 3.2 oz)   SpO2 97%   BMI 34.31 kg/m²       O2 Device: Nasal cannula   O2 Flow Rate (L/min): 6 l/min   Temp (24hrs), Av.4 °F (36.9 °C), Min:97.6 °F (36.4 °C), Max:100 °F (37.8 °C)       Intake/Output:   Last shift:      No intake/output data recorded. Last 3 shifts:  1901 -  0700  In: 790 [I.V.:790]  Out: -     Intake/Output Summary (Last 24 hours) at 2022 1423  Last data filed at 2022 0330  Gross per 24 hour   Intake 270 ml   Output    Net 270 ml      Physical Exam:   General:  Alert, cooperative, no distress, appears stated age. Head:  Normocephalic, without obvious abnormality, atraumatic. Eyes:  Conjunctivae/corneas clear. PERRL, EOMs intact. Nose: Nares normal. Septum midline. Mucosa normal. No drainage or sinus tenderness.    Throat: Lips, mucosa, and tongue normal. Teeth and gums normal.   Neck: Supple, symmetrical, trachea midline, no adenopathy, thyroid: no enlargment/tenderness/nodules, no carotid bruit and no JVD. Back:   Symmetric, no curvature. ROM normal.   Lungs:   Basilar crackles, seems worse on the right lung base more than left. Seems comfortable. Pox was 95% on 5L NC>    Chest wall:  No tenderness or deformity. Heart:  Regular rate and rhythm, S1, S2 normal, no murmur, click, rub or gallop. Abdomen:   Soft, non-tender. Bowel sounds normal. No masses,  No organomegaly. Obese. Extremities: Extremities normal, atraumatic, no cyanosis or edema. Pulses: 2+ and symmetric all extremities. Skin: Skin color, texture, turgor normal. No rashes or lesions   Lymph nodes: Cervical, supraclavicular, and axillary nodes normal.   Neurologic: Grossly nonfocal     Data review:     Recent Results (from the past 24 hour(s))   CBC WITH AUTOMATED DIFF    Collection Time: 02/05/22  6:45 AM   Result Value Ref Range    WBC 9.7 3.6 - 11.0 K/uL    RBC 3.60 (L) 3.80 - 5.20 M/uL    HGB 11.9 11.5 - 16.0 g/dL    HCT 34.6 (L) 35.0 - 47.0 %    MCV 96.1 80.0 - 99.0 FL    MCH 33.1 26.0 - 34.0 PG    MCHC 34.4 30.0 - 36.5 g/dL    RDW 12.0 11.5 - 14.5 %    PLATELET 092 125 - 132 K/uL    MPV 9.2 8.9 - 12.9 FL    NRBC 0.0 0  WBC    ABSOLUTE NRBC 0.00 0.00 - 0.01 K/uL    NEUTROPHILS 74 32 - 75 %    LYMPHOCYTES 14 12 - 49 %    MONOCYTES 10 5 - 13 %    EOSINOPHILS 2 0 - 7 %    BASOPHILS 0 0 - 1 %    IMMATURE GRANULOCYTES 0 0.0 - 0.5 %    ABS. NEUTROPHILS 7.2 1.8 - 8.0 K/UL    ABS. LYMPHOCYTES 1.3 0.8 - 3.5 K/UL    ABS. MONOCYTES 1.0 0.0 - 1.0 K/UL    ABS. EOSINOPHILS 0.2 0.0 - 0.4 K/UL    ABS. BASOPHILS 0.0 0.0 - 0.1 K/UL    ABS. IMM.  GRANS. 0.0 0.00 - 0.04 K/UL    DF AUTOMATED     PHOSPHORUS    Collection Time: 02/05/22  6:45 AM   Result Value Ref Range    Phosphorus 2.6 2.6 - 4.7 MG/DL   METABOLIC PANEL, COMPREHENSIVE    Collection Time: 02/05/22  6:45 AM   Result Value Ref Range    Sodium 126 (L) 136 - 145 mmol/L    Potassium 4.1 3.5 - 5.1 mmol/L    Chloride 93 (L) 97 - 108 mmol/L    CO2 27 21 - 32 mmol/L    Anion gap 6 5 - 15 mmol/L    Glucose 104 (H) 65 - 100 mg/dL    BUN 11 6 - 20 MG/DL    Creatinine 0.36 (L) 0.55 - 1.02 MG/DL    BUN/Creatinine ratio 31 (H) 12 - 20      GFR est AA >60 >60 ml/min/1.73m2    GFR est non-AA >60 >60 ml/min/1.73m2    Calcium 8.0 (L) 8.5 - 10.1 MG/DL    Bilirubin, total 0.7 0.2 - 1.0 MG/DL    ALT (SGPT) 41 12 - 78 U/L    AST (SGOT) 46 (H) 15 - 37 U/L    Alk. phosphatase 59 45 - 117 U/L    Protein, total 5.8 (L) 6.4 - 8.2 g/dL    Albumin 2.4 (L) 3.5 - 5.0 g/dL    Globulin 3.4 2.0 - 4.0 g/dL    A-G Ratio 0.7 (L) 1.1 - 2.2         Imagin22:   Echo:  Result status: Final result       Left Ventricle: Left ventricle size is normal. Normal wall thickness. Normal wall motion. Normal left ventricular systolic function with a visually estimated EF of 55 - 60%.   Left Atrium: Left atrium is mildly dilated.              I have personally reviewed the patients radiographs and have reviewed the reports:  22: CTA of chest: FINDINGS:  THYROID: No nodule. MEDIASTINUM: Small reactive lymph nodes are seen throughout the mediastinum. JOAQUINA: No mass or lymphadenopathy. THORACIC AORTA: No dissection or aneurysm. PULMONARY ARTERIES: Normal in caliber. The pulmonary arteries are well enhanced. No evidence of pulmonary embolus. TRACHEA/BRONCHI: Patent. ESOPHAGUS: No wall thickening or dilatation. HEART: Normal in size. PLEURA: Small left pleural effusion. LUNGS: There is diffuse patchy opacification throughout the lungs. INCIDENTALLY IMAGED UPPER ABDOMEN: No focal abnormality. BONES: No destructive bone lesion.      IMPRESSION  1. Negative for pulmonary embolus. .  2. Extensive airspace disease throughout the lungs. 3. Small left pleural effusion.         Colby Campbell MD

## 2022-02-05 NOTE — PROGRESS NOTES
NAME: Alexi Nair        :  1950        MRN:  [id number]         Assessment:  Hyponatremia- multifactorial etiology; excessive free water intake, poor solute intake and SIADH. Urine studies (high urine Na and U. Osm) + lowish uric acid. TSH is nl     HTN  SOB/Hypoxia  PNA- Covid testing is negative     Sodium lower, 126.     Plan/Recommendations:  Continue FR of 1.2 L/day for now; will add UreNa  IV lasix has been increased to 40 mg every day- which should also help correction of Na in the setting of SIADH  May need maintenance lasix 40 mg PO QD  Encourage increase solute intake- can have REGULAR DIET. Avoid thiazides, SSRI, NSAIDs  AM labs  IV ABx per 1ry team    Subjective:     Chief Complaint:  Very thirsty (more than usual). We had a long talk about the cause of her low salt and the treatment plan as above (decrease water intake, for now; increase solute, increase urinary water losses with lasix and ure-na). She understood and is agreeable. I promised her I would increase fluid restriction as soon as able. Review of Systems:    Symptom Y/N Comments  Symptom Y/N Comments   Fever/Chills    Chest Pain     Poor Appetite    Edema     Cough    Abdominal Pain     Sputum    Joint Pain     SOB/AL    Pruritis/Rash     Nausea/vomit    Tolerating PT/OT     Diarrhea    Tolerating Diet     Constipation    Other       Could not obtain due to:      Objective:     VITALS:   Last 24hrs VS reviewed since prior progress note.  Most recent are:  Visit Vitals  BP (!) 148/86   Pulse (!) 104   Temp 98.6 °F (37 °C)   Resp 20   Ht 5' 5\" (1.651 m)   Wt 93.5 kg (206 lb 3.2 oz)   SpO2 95%   BMI 34.31 kg/m²       Intake/Output Summary (Last 24 hours) at 2022 9313  Last data filed at 2022 7661  Gross per 24 hour   Intake 270 ml   Output    Net 270 ml      Telemetry Reviewed:     PHYSICAL EXAM:  General: NAD      Lab Data Reviewed: (see below)    Medications Reviewed: (see below)    PMH/SH reviewed - no change compared to H&P  ________________________________________________________________________  Care Plan discussed with:  Patient     Family      RN     Care Manager                    Consultant:          Comments   >50% of visit spent in counseling and coordination of care       ________________________________________________________________________  Roselyn Stearns MD     Procedures: see electronic medical records for all procedures/Xrays and details which  were not copied into this note but were reviewed prior to creation of Plan. LABS:  Recent Labs     02/05/22 0645 02/04/22 0339   WBC 9.7 8.5   HGB 11.9 12.7   HCT 34.6* 37.1    346     Recent Labs     02/05/22 0645 02/04/22 0339 02/03/22  0056   * 129* 128*   K 4.1 3.6 3.9   CL 93* 96* 97   CO2 27 28 25   BUN 11 14 10   CREA 0.36* 0.51* 0.63   * 101* 153*   CA 8.0* 8.3* 8.5   MG  --  2.3 2.3   PHOS 2.6  --  3.6   URICA  --  3.0  --      Recent Labs     02/05/22 0645 02/04/22 0339 02/02/22  1043   AP 59 58 66   TP 5.8* 6.6 6.6   ALB 2.4* 2.3* 2.5*   GLOB 3.4 4.3* 4.1*     No results for input(s): INR, PTP, APTT, INREXT in the last 72 hours. No results for input(s): FE, TIBC, PSAT, FERR in the last 72 hours. No results found for: FOL, RBCF   Recent Labs     02/02/22  1621   PH 7.42   PCO2 40   PO2 82     No results for input(s): CPK, CKMB in the last 72 hours.     No lab exists for component: TROPONINI  No components found for: GLPOC  No results found for: COLOR, APPRN, SPGRU, REFSG, MANE, PROTU, GLUCU, KETU, BILU, UROU, NIMA, LEUKU, GLUKE, EPSU, BACTU, WBCU, RBCU, CASTS, UCRY    MEDICATIONS:  Current Facility-Administered Medications   Medication Dose Route Frequency    guaiFENesin ER (MUCINEX) tablet 600 mg  600 mg Oral Q12H    albuterol-ipratropium (DUO-NEB) 2.5 MG-0.5 MG/3 ML  3 mL Nebulization Q6H PRN    furosemide (LASIX) injection 40 mg  40 mg IntraVENous DAILY    fluticasone propionate (FLONASE) 50 mcg/actuation nasal spray 2 Spray  2 Spray Both Nostrils DAILY    sodium chloride (OCEAN) 0.65 % nasal squeeze bottle 2 Spray  2 Spray Both Nostrils Q2H PRN    losartan (COZAAR) tablet 100 mg  100 mg Oral DAILY    sodium chloride (NS) flush 5-40 mL  5-40 mL IntraVENous Q8H    sodium chloride (NS) flush 5-40 mL  5-40 mL IntraVENous PRN    acetaminophen (TYLENOL) tablet 650 mg  650 mg Oral Q6H PRN    Or    acetaminophen (TYLENOL) suppository 650 mg  650 mg Rectal Q6H PRN    polyethylene glycol (MIRALAX) packet 17 g  17 g Oral DAILY PRN    ondansetron (ZOFRAN ODT) tablet 4 mg  4 mg Oral Q8H PRN    Or    ondansetron (ZOFRAN) injection 4 mg  4 mg IntraVENous Q6H PRN    enoxaparin (LOVENOX) injection 40 mg  40 mg SubCUTAneous Q24H    cefTRIAXone (ROCEPHIN) 2 g in sterile water (preservative free) 20 mL IV syringe  2 g IntraVENous Q24H    azithromycin (ZITHROMAX) 500 mg in 0.9% sodium chloride 250 mL (VIAL-MATE)  500 mg IntraVENous Q24H

## 2022-02-05 NOTE — PROGRESS NOTES
Patient is refusing fluid restriction. Patient says \"I am not going to do this\". I will let the on call MD come speak to patient.

## 2022-02-06 LAB
ALBUMIN SERPL-MCNC: 2.1 G/DL (ref 3.5–5)
ALBUMIN/GLOB SERPL: 0.5 {RATIO} (ref 1.1–2.2)
ALP SERPL-CCNC: 58 U/L (ref 45–117)
ALT SERPL-CCNC: 46 U/L (ref 12–78)
ANION GAP SERPL CALC-SCNC: 5 MMOL/L (ref 5–15)
AST SERPL-CCNC: 40 U/L (ref 15–37)
BASOPHILS # BLD: 0 K/UL (ref 0–0.1)
BASOPHILS NFR BLD: 0 % (ref 0–1)
BILIRUB SERPL-MCNC: 0.4 MG/DL (ref 0.2–1)
BUN SERPL-MCNC: 9 MG/DL (ref 6–20)
BUN/CREAT SERPL: 21 (ref 12–20)
CALCIUM SERPL-MCNC: 8.8 MG/DL (ref 8.5–10.1)
CHLORIDE SERPL-SCNC: 95 MMOL/L (ref 97–108)
CO2 SERPL-SCNC: 30 MMOL/L (ref 21–32)
CREAT SERPL-MCNC: 0.42 MG/DL (ref 0.55–1.02)
DIFFERENTIAL METHOD BLD: ABNORMAL
EOSINOPHIL # BLD: 0.1 K/UL (ref 0–0.4)
EOSINOPHIL NFR BLD: 1 % (ref 0–7)
ERYTHROCYTE [DISTWIDTH] IN BLOOD BY AUTOMATED COUNT: 11.9 % (ref 11.5–14.5)
ERYTHROCYTE [SEDIMENTATION RATE] IN BLOOD: 56 MM/HR (ref 0–30)
GLOBULIN SER CALC-MCNC: 4.3 G/DL (ref 2–4)
GLUCOSE SERPL-MCNC: 101 MG/DL (ref 65–100)
HCT VFR BLD AUTO: 34.4 % (ref 35–47)
HGB BLD-MCNC: 11.7 G/DL (ref 11.5–16)
IMM GRANULOCYTES # BLD AUTO: 0 K/UL (ref 0–0.04)
IMM GRANULOCYTES NFR BLD AUTO: 0 % (ref 0–0.5)
LYMPHOCYTES # BLD: 1.9 K/UL (ref 0.8–3.5)
LYMPHOCYTES NFR BLD: 19 % (ref 12–49)
MAGNESIUM SERPL-MCNC: 2.6 MG/DL (ref 1.6–2.4)
MCH RBC QN AUTO: 32.9 PG (ref 26–34)
MCHC RBC AUTO-ENTMCNC: 34 G/DL (ref 30–36.5)
MCV RBC AUTO: 96.6 FL (ref 80–99)
MONOCYTES # BLD: 0.9 K/UL (ref 0–1)
MONOCYTES NFR BLD: 8 % (ref 5–13)
NEUTS SEG # BLD: 7.2 K/UL (ref 1.8–8)
NEUTS SEG NFR BLD: 72 % (ref 32–75)
NRBC # BLD: 0 K/UL (ref 0–0.01)
NRBC BLD-RTO: 0 PER 100 WBC
PLATELET # BLD AUTO: 387 K/UL (ref 150–400)
PMV BLD AUTO: 9.2 FL (ref 8.9–12.9)
POTASSIUM SERPL-SCNC: 3.9 MMOL/L (ref 3.5–5.1)
PROT SERPL-MCNC: 6.4 G/DL (ref 6.4–8.2)
RBC # BLD AUTO: 3.56 M/UL (ref 3.8–5.2)
SODIUM SERPL-SCNC: 130 MMOL/L (ref 136–145)
WBC # BLD AUTO: 10.1 K/UL (ref 3.6–11)

## 2022-02-06 PROCEDURE — 86160 COMPLEMENT ANTIGEN: CPT

## 2022-02-06 PROCEDURE — 65660000000 HC RM CCU STEPDOWN

## 2022-02-06 PROCEDURE — 74011636637 HC RX REV CODE- 636/637: Performed by: INTERNAL MEDICINE

## 2022-02-06 PROCEDURE — 85025 COMPLETE CBC W/AUTO DIFF WBC: CPT

## 2022-02-06 PROCEDURE — 74011250637 HC RX REV CODE- 250/637: Performed by: INTERNAL MEDICINE

## 2022-02-06 PROCEDURE — 80053 COMPREHEN METABOLIC PANEL: CPT

## 2022-02-06 PROCEDURE — 74011000250 HC RX REV CODE- 250: Performed by: NURSE PRACTITIONER

## 2022-02-06 PROCEDURE — 74011000250 HC RX REV CODE- 250: Performed by: INTERNAL MEDICINE

## 2022-02-06 PROCEDURE — 74011250636 HC RX REV CODE- 250/636: Performed by: INTERNAL MEDICINE

## 2022-02-06 PROCEDURE — 83735 ASSAY OF MAGNESIUM: CPT

## 2022-02-06 PROCEDURE — 74011250636 HC RX REV CODE- 250/636: Performed by: NURSE PRACTITIONER

## 2022-02-06 PROCEDURE — 74011250637 HC RX REV CODE- 250/637: Performed by: NURSE PRACTITIONER

## 2022-02-06 PROCEDURE — 36415 COLL VENOUS BLD VENIPUNCTURE: CPT

## 2022-02-06 PROCEDURE — 85652 RBC SED RATE AUTOMATED: CPT

## 2022-02-06 RX ORDER — DENOSUMAB 60 MG/ML
60 INJECTION SUBCUTANEOUS
COMMUNITY

## 2022-02-06 RX ORDER — IBUPROFEN 600 MG/1
600 TABLET ORAL
COMMUNITY
End: 2022-02-08

## 2022-02-06 RX ORDER — GOLIMUMAB 50 MG/4ML
180 SOLUTION INTRAVENOUS
COMMUNITY

## 2022-02-06 RX ORDER — FUROSEMIDE 40 MG/1
40 TABLET ORAL DAILY
Status: DISCONTINUED | OUTPATIENT
Start: 2022-02-06 | End: 2022-02-08 | Stop reason: HOSPADM

## 2022-02-06 RX ORDER — FOLIC ACID 1 MG/1
1 TABLET ORAL
COMMUNITY

## 2022-02-06 RX ORDER — DIPHENHYDRAMINE HCL 25 MG
25 CAPSULE ORAL
Status: DISCONTINUED | OUTPATIENT
Start: 2022-02-06 | End: 2022-02-08 | Stop reason: HOSPADM

## 2022-02-06 RX ADMIN — SODIUM CHLORIDE, PRESERVATIVE FREE 10 ML: 5 INJECTION INTRAVENOUS at 22:03

## 2022-02-06 RX ADMIN — ENOXAPARIN SODIUM 40 MG: 100 INJECTION SUBCUTANEOUS at 17:00

## 2022-02-06 RX ADMIN — GUAIFENESIN 600 MG: 600 TABLET, EXTENDED RELEASE ORAL at 09:13

## 2022-02-06 RX ADMIN — DIPHENHYDRAMINE HCL 25 MG: 25 CAPSULE ORAL at 23:09

## 2022-02-06 RX ADMIN — WATER 2 G: 1 INJECTION INTRAMUSCULAR; INTRAVENOUS; SUBCUTANEOUS at 22:03

## 2022-02-06 RX ADMIN — SODIUM CHLORIDE, PRESERVATIVE FREE 10 ML: 5 INJECTION INTRAVENOUS at 13:03

## 2022-02-06 RX ADMIN — PREDNISONE 20 MG: 20 TABLET ORAL at 16:56

## 2022-02-06 RX ADMIN — AZITHROMYCIN MONOHYDRATE 500 MG: 500 INJECTION, POWDER, LYOPHILIZED, FOR SOLUTION INTRAVENOUS at 22:03

## 2022-02-06 RX ADMIN — LOSARTAN POTASSIUM 100 MG: 50 TABLET, FILM COATED ORAL at 09:13

## 2022-02-06 RX ADMIN — FUROSEMIDE 40 MG: 40 TABLET ORAL at 09:43

## 2022-02-06 RX ADMIN — SODIUM CHLORIDE, PRESERVATIVE FREE 10 ML: 5 INJECTION INTRAVENOUS at 05:40

## 2022-02-06 RX ADMIN — GUAIFENESIN 600 MG: 600 TABLET, EXTENDED RELEASE ORAL at 22:04

## 2022-02-06 RX ADMIN — FLUTICASONE PROPIONATE 2 SPRAY: 50 SPRAY, METERED NASAL at 09:13

## 2022-02-06 RX ADMIN — PREDNISONE 20 MG: 20 TABLET ORAL at 09:13

## 2022-02-06 NOTE — PROGRESS NOTES
NAME: Sherryle Newer        :  1950        MRN:  [id number]         Assessment:  Hyponatremia- multifactorial etiology; excessive free water intake, poor solute intake and SIADH. Urine studies (high urine Na and U. Osm) + lowish uric acid. TSH is nl     HTN  SOB/Hypoxia  PNA- Covid testing is negative     Sodium better, 126 to 130     Plan/Recommendations:    Continue FR of 1.2 L/day for now; she refused Janina Oneal (will d/c order)  Change to PO Lasix 40 daily; home on this for now  Encourage increase solute intake   Avoid thiazides, SSRI, NSAIDs  She is stable for discharge from a Nephrology standpoint    Subjective:     Chief Complaint: less thirsty. Refused urena yesterday. We discussed the etiology and treatment of her hyponatremia. Review of Systems:    Symptom Y/N Comments  Symptom Y/N Comments   Fever/Chills    Chest Pain     Poor Appetite    Edema     Cough    Abdominal Pain     Sputum    Joint Pain     SOB/AL    Pruritis/Rash     Nausea/vomit    Tolerating PT/OT     Diarrhea    Tolerating Diet     Constipation    Other       Could not obtain due to:      Objective:     VITALS:   Last 24hrs VS reviewed since prior progress note.  Most recent are:  Visit Vitals  /77 (BP 1 Location: Right upper arm, BP Patient Position: Lying left side)   Pulse 94   Temp 98 °F (36.7 °C)   Resp 18   Ht 5' 5\" (1.651 m)   Wt 91.5 kg (201 lb 11.2 oz)   SpO2 96%   BMI 33.56 kg/m²       Intake/Output Summary (Last 24 hours) at 2022 9445  Last data filed at 2022 2323  Gross per 24 hour   Intake 950 ml   Output    Net 950 ml      Telemetry Reviewed:     PHYSICAL EXAM:  General: NAD      Lab Data Reviewed: (see below)    Medications Reviewed: (see below)    PMH/SH reviewed - no change compared to H&P  ________________________________________________________________________  Care Plan discussed with:  Patient     Family      RN     Care Manager                    Consultant:          Comments   >50% of visit spent in counseling and coordination of care       ________________________________________________________________________  Júnior Mcclelland MD     Procedures: see electronic medical records for all procedures/Xrays and details which  were not copied into this note but were reviewed prior to creation of Plan. LABS:  Recent Labs     02/06/22 0548 02/05/22 0645   WBC 10.1 9.7   HGB 11.7 11.9   HCT 34.4* 34.6*    364     Recent Labs     02/06/22 0548 02/05/22 0645 02/04/22 0339   * 126* 129*   K 3.9 4.1 3.6   CL 95* 93* 96*   CO2 30 27 28   BUN 9 11 14   CREA 0.42* 0.36* 0.51*   * 104* 101*   CA 8.8 8.0* 8.3*   MG  --   --  2.3   PHOS  --  2.6  --    URICA  --   --  3.0     Recent Labs     02/06/22 0548 02/05/22 0645 02/04/22 0339   AP 58 59 58   TP 6.4 5.8* 6.6   ALB 2.1* 2.4* 2.3*   GLOB 4.3* 3.4 4.3*     No results for input(s): INR, PTP, APTT, INREXT, INREXT in the last 72 hours. No results for input(s): FE, TIBC, PSAT, FERR in the last 72 hours. No results found for: FOL, RBCF   No results for input(s): PH, PCO2, PO2 in the last 72 hours. No results for input(s): CPK, CKMB in the last 72 hours.     No lab exists for component: TROPONINI  No components found for: GLPOC  No results found for: COLOR, APPRN, SPGRU, REFSG, MANE, PROTU, GLUCU, KETU, BILU, UROU, NIMA, LEUKU, GLUKE, EPSU, BACTU, WBCU, RBCU, CASTS, UCRY    MEDICATIONS:  Current Facility-Administered Medications   Medication Dose Route Frequency    urea (URE-NA) 15 gram packet 2 Packet  2 Packet Oral DAILY    predniSONE (DELTASONE) tablet 20 mg  20 mg Oral BID WITH MEALS    guaiFENesin ER (MUCINEX) tablet 600 mg  600 mg Oral Q12H    albuterol-ipratropium (DUO-NEB) 2.5 MG-0.5 MG/3 ML  3 mL Nebulization Q6H PRN    furosemide (LASIX) injection 40 mg  40 mg IntraVENous DAILY    fluticasone propionate (FLONASE) 50 mcg/actuation nasal spray 2 Spray  2 [place] Both Nostrils DAILY    sodium chloride (OCEAN) 0.65 % nasal squeeze bottle 2 Spray  2 Spray Both Nostrils Q2H PRN    losartan (COZAAR) tablet 100 mg  100 mg Oral DAILY    sodium chloride (NS) flush 5-40 mL  5-40 mL IntraVENous Q8H    sodium chloride (NS) flush 5-40 mL  5-40 mL IntraVENous PRN    acetaminophen (TYLENOL) tablet 650 mg  650 mg Oral Q6H PRN    Or    acetaminophen (TYLENOL) suppository 650 mg  650 mg Rectal Q6H PRN    polyethylene glycol (MIRALAX) packet 17 g  17 g Oral DAILY PRN    ondansetron (ZOFRAN ODT) tablet 4 mg  4 mg Oral Q8H PRN    Or    ondansetron (ZOFRAN) injection 4 mg  4 mg IntraVENous Q6H PRN    enoxaparin (LOVENOX) injection 40 mg  40 mg SubCUTAneous Q24H    cefTRIAXone (ROCEPHIN) 2 g in sterile water (preservative free) 20 mL IV syringe  2 g IntraVENous Q24H    azithromycin (ZITHROMAX) 500 mg in 0.9% sodium chloride 250 mL (VIAL-MATE)  500 mg IntraVENous Q24H

## 2022-02-06 NOTE — PROGRESS NOTES
Pharmacist Admission Medication Reconciliation:    Information obtained from: This medication history was obtained from the patient by phone. RxQuery data available¹:  NO    Summary:     Medications added: denosumab, golimumab  Medications deleted: NA  Dose changes: NA    Active and held inpatient orders were reviewed and no changes are needed. ¹RxQuery pharmacy benefit data reflects medications processed through the patient's insurance, however this data does NOT capture whether the medication is currently being taken by the patient. Allergies:  Iodine    Significant PMH/Disease States:   Past Medical History:   Diagnosis Date    HLD (hyperlipidemia)     HTN (hypertension)     Rheumatoid arthritis ([facility] [address]      Chief Complaint for this Admission:    Chief Complaint   Patient presents with    Shortness of Breath     Prior to Admission Medications:   Prior to Admission Medications   Prescriptions Last Dose Informant Patient Reported? Taking?   0.9% sodium chloride solp 100 mL with golimumab 12.5 mg/mL soln 2 mg/kg   Yes Yes   Si mg/kg by IntraVENous route every two (2) months. (Brand name Simponi; outpatient infusion for Rheumatoid arthritis)   denosumab (Prolia) 60 mg/mL injection   Yes Yes   Si mg by SubCUTAneous route. folic acid (FOLVITE) 1 mg tablet   Yes Yes   Sig: Take 1 mg by mouth four (4) days a week. ibuprofen (MOTRIN) 600 mg tablet   Yes Yes   Sig: Take 600 mg by mouth every six (6) hours as needed for Pain.   leucovorin calcium (WELLCOVORIN) 5 mg tablet   Yes Yes   Sig: Take 5 mg by mouth three (3) days a week. (daily x 3 days after methotrexate doses)   methotrexate (RHEUMATREX) 2.5 mg tablet   Yes Yes   Sig: Take 17.5 mg by mouth every seven (7) days. predniSONE (DELTASONE) 10 mg tablet   Yes Yes   Sig: Take 10 mg by mouth daily as needed (rheumatoid arthritis). telmisartan (MICARDIS) 80 mg tablet   Yes Yes   Sig: Take 80 mg by mouth daily.       Facility-Administered Medications: None     Thank you for allowing me to participate in this patient's care. Please call  or  with any questions. Vahid Suazo, Pharm. D., BCPS, BCPPS

## 2022-02-06 NOTE — PROGRESS NOTES
Pulmonary, Critical Care, and Sleep Medicine    Patient Consult    Name: Zen Mcdermott MRN: [id number]   : 1950 [facility]   Date: 2022        IMPRESSION:   · At Risk for ILD from Rheumatoid and from Mtx related lung disease. No overt infection. · Dyspnea on Exertion for several days. · Acute Hypoxic Respiratory Failure: Hypoxia, noted to have exertional hypoxia. Noted at PCP. Pox was 89% on RA at PCP office. Was noted to have pox of 92% on RA here. · Chronic Left Pleural effusion, multiple prior thoracentesis. · Rheumatoid Arthritis and on Mtx. · Immunocompromised. · Had Covid Vaccine and booster. · Procalcitonin level is low, does not suggest bacterial infection. · Noted to have hypertension, 168/114 on arrival.   · Covid PCR here was negative. · Anemia: hgb of 11.9  · Hyponatremia  · Increased CRP  · Allergy to Iodine. · Ex Smoker: quit 3-4 year ago. RECOMMENDATIONS:   · Discussed with pt, Will place on Prednisone for possible Auto immune or Drug induced lung disease. · Hold Mtx for this week. · Will need to get PFTs as an outpt for baseline. She sees Dr. Milad wKok as outpt Pulmonary MD.   · Agree with Zithromax. · Diurese as able  · Wean oxygen to keep pox over 90%. Try to get an idea about her home oxygen needs. · Will follow. Pt feels a little better. Still with cough, worse in am.   Still on moderate oxygen at 6L with Pox of 95%. No headaches. No chest pain, no back pain. No leg pain. Has been tolerating po intake well. Reviewed renal notes. They feel she is ready for discharge. PCP: Dr. Bettie Vázquez    Subjective:   Cc: Dyspnea on Exertion, Hypoxia. HPI:   This patient has been seen and evaluated at the request of Dr. Tamra Barajas for above. Patient is a 70 y.o. female who was admitted on 2022 for worsening shortness of breath. Noted to have hypoxia with exertion. Pt has hx of Rheumatoid Arthritis. ON Mtx.  Had prior Covid Vaccines. Denies fevers. Has generalized weakness. Increasing shortness of breath. Worsening AL. Has a dry cough worse for the last 3 weeks. Had significant dyspnea with climbing a flight of stairs about 1 week prior. NO issues with fevers, chills, sweats. NO sinus issues. NO hemoptysis. NO GERD or aspiration. No joint pain or swelling. Otherwise had been feeling near baseline. Was noted to have crackles in all lung fields. Past Medical History:   Diagnosis Date    HLD (hyperlipidemia)     HTN (hypertension)     Rheumatoid arthritis ([facility] [address]       Past Surgical History:   Procedure Laterality Date    HX APPENDECTOMY      HX BREAST BIOPSY Left     Surgical BX (Over 20yrs ago) - BENIGN     HX OTHER SURGICAL      Breast Biopsy      Prior to Admission medications    Medication Sig Start Date End Date Taking? Authorizing Provider   telmisartan (MICARDIS) 80 mg tablet Take 80 mg by mouth daily. 21  Yes Provider, Historical   leucovorin calcium (WELLCOVORIN) 5 mg tablet TK 1  T  PO  FOR  3  DAYS  BEGINNING  THE  DAY  AFTER  METHOTREXATE  DOSE 3/28/18   Provider, Historical   methotrexate (RHEUMATREX) 2.5 mg tablet TK  5  TS  PO  Q  WEEK  ON  THE  SAME  DAY 18   Provider, Historical   predniSONE (DELTASONE) 10 mg tablet  18   Provider, Historical   ibuprofen (MOTRIN) 600 mg tablet Take 600 mg by mouth daily. Provider, Historical   FOLIC ACID/MULTIVIT-MIN/LUTEIN (CENTRUM SILVER PO) Take 1 Tab by mouth daily. Provider, Historical   vitamin a-vitamin c-vit e-min (OCUVITE) tablet Take 1 Tab by mouth daily. Provider, Historical     Allergies   Allergen Reactions    Iodine Rash      Social History     Tobacco Use    Smoking status: Former Smoker     Packs/day: 1.00     Years: 40.00     Pack years: 40.00     Types: Cigarettes     Quit date: 10/26/2016     Years since quittin.2    Smokeless tobacco: Never Used   Substance Use Topics    Alcohol use: Yes      History reviewed.  No pertinent family history. Current Facility-Administered Medications   Medication Dose Route Frequency    furosemide (LASIX) tablet 40 mg  40 mg Oral DAILY    predniSONE (DELTASONE) tablet 20 mg  20 mg Oral BID WITH MEALS    guaiFENesin ER (MUCINEX) tablet 600 mg  600 mg Oral Q12H    fluticasone propionate (FLONASE) 50 mcg/actuation nasal spray 2 Spray  2 Spray Both Nostrils DAILY    losartan (COZAAR) tablet 100 mg  100 mg Oral DAILY    sodium chloride (NS) flush 5-40 mL  5-40 mL IntraVENous Q8H    enoxaparin (LOVENOX) injection 40 mg  40 mg SubCUTAneous Q24H    cefTRIAXone (ROCEPHIN) 2 g in sterile water (preservative free) 20 mL IV syringe  2 g IntraVENous Q24H    azithromycin (ZITHROMAX) 500 mg in 0.9% sodium chloride 250 mL (VIAL-MATE)  500 mg IntraVENous Q24H       Review of Systems:  Constitutional: positive for fatigue  Eyes: negative  Ears, nose, mouth, throat, and face: negative  Respiratory: positive for cough, wheezing or dyspnea on exertion  Cardiovascular: negative  Gastrointestinal: negative  Genitourinary:negative  Integument/breast: negative  Hematologic/lymphatic: negative  Musculoskeletal:negative  Neurological: negative  Behavioral/Psych: negative  Endocrine: negative  Allergic/Immunologic: negative    Objective:   Vital Signs:    Visit Vitals  /77 (BP 1 Location: Right upper arm, BP Patient Position: Sitting)   Pulse (!) 106   Temp 98 °F (36.7 °C)   Resp 18   Ht 5' 5\" (1.651 m)   Wt 91.5 kg (201 lb 11.2 oz)   SpO2 95%   BMI 33.56 kg/m²       O2 Device: Nasal cannula   O2 Flow Rate (L/min): 6 l/min   Temp (24hrs), Av.3 °F (36.8 °C), Min:97.9 °F (36.6 °C), Max:99.3 °F (37.4 °C)       Intake/Output:   Last shift:      No intake/output data recorded. Last 3 shifts:  1901 -  0700  In: 2729 [P.O.:950;  I.V.:270]  Out: -     Intake/Output Summary (Last 24 hours) at 2022 1032  Last data filed at 2022 2323  Gross per 24 hour   Intake 950 ml   Output    Net 950 ml Physical Exam:   General:  Alert, cooperative, no distress, appears stated age. On 6L oxygen with pox of 95%. Has a wet cough when seen this am.    Head:  Normocephalic, without obvious abnormality, atraumatic. Eyes:  Conjunctivae/corneas clear. PERRL, EOMs intact. Nose: Nares normal. Septum midline. Mucosa normal. No drainage or sinus tenderness. Throat: Lips, mucosa, and tongue normal. Teeth and gums normal.   Neck: Supple, symmetrical, trachea midline, no adenopathy, thyroid: no enlargment/tenderness/nodules, no carotid bruit and no JVD. Back:   Symmetric, no curvature. ROM normal.   Lungs:   Basilar crackles, seems worse on the right lung base more than left. Seems comfortable. Chest wall:  No tenderness or deformity. Heart:  Regular rate and rhythm, S1, S2 normal, no murmur, click, rub or gallop. Abdomen:   Soft, non-tender. Bowel sounds normal. No masses,  No organomegaly. Obese. Extremities: Extremities normal, atraumatic, no cyanosis or edema. Pulses: 2+ and symmetric all extremities. Skin: Skin color, texture, turgor normal. No rashes or lesions   Lymph nodes: Cervical, supraclavicular, and axillary nodes normal.   Neurologic: Grossly nonfocal, motor intact.       Data review:     Recent Results (from the past 24 hour(s))   CULTURE, RESPIRATORY/SPUTUM/BRONCH W GRAM STAIN    Collection Time: 02/05/22  2:32 PM    Specimen: Sputum   Result Value Ref Range    Special Requests: NO SPECIAL REQUESTS      GRAM STAIN OCCASIONAL WBCS SEEN      GRAM STAIN RARE EPITHELIAL CELLS SEEN      GRAM STAIN RARE GRAM NEGATIVE RODS      Culture result: PENDING    CBC WITH AUTOMATED DIFF    Collection Time: 02/06/22  5:48 AM   Result Value Ref Range    WBC 10.1 3.6 - 11.0 K/uL    RBC 3.56 (L) 3.80 - 5.20 M/uL    HGB 11.7 11.5 - 16.0 g/dL    HCT 34.4 (L) 35.0 - 47.0 %    MCV 96.6 80.0 - 99.0 FL    MCH 32.9 26.0 - 34.0 PG    MCHC 34.0 30.0 - 36.5 g/dL    RDW 11.9 11.5 - 14.5 %    PLATELET 173 036 - 362 K/uL    MPV 9.2 8.9 - 12.9 FL    NRBC 0.0 0  WBC    ABSOLUTE NRBC 0.00 0.00 - 0.01 K/uL    NEUTROPHILS 72 32 - 75 %    LYMPHOCYTES 19 12 - 49 %    MONOCYTES 8 5 - 13 %    EOSINOPHILS 1 0 - 7 %    BASOPHILS 0 0 - 1 %    IMMATURE GRANULOCYTES 0 0.0 - 0.5 %    ABS. NEUTROPHILS 7.2 1.8 - 8.0 K/UL    ABS. LYMPHOCYTES 1.9 0.8 - 3.5 K/UL    ABS. MONOCYTES 0.9 0.0 - 1.0 K/UL    ABS. EOSINOPHILS 0.1 0.0 - 0.4 K/UL    ABS. BASOPHILS 0.0 0.0 - 0.1 K/UL    ABS. IMM. GRANS. 0.0 0.00 - 0.04 K/UL    DF AUTOMATED     METABOLIC PANEL, COMPREHENSIVE    Collection Time: 22  5:48 AM   Result Value Ref Range    Sodium 130 (L) 136 - 145 mmol/L    Potassium 3.9 3.5 - 5.1 mmol/L    Chloride 95 (L) 97 - 108 mmol/L    CO2 30 21 - 32 mmol/L    Anion gap 5 5 - 15 mmol/L    Glucose 101 (H) 65 - 100 mg/dL    BUN 9 6 - 20 MG/DL    Creatinine 0.42 (L) 0.55 - 1.02 MG/DL    BUN/Creatinine ratio 21 (H) 12 - 20      GFR est AA >60 >60 ml/min/1.73m2    GFR est non-AA >60 >60 ml/min/1.73m2    Calcium 8.8 8.5 - 10.1 MG/DL    Bilirubin, total 0.4 0.2 - 1.0 MG/DL    ALT (SGPT) 46 12 - 78 U/L    AST (SGOT) 40 (H) 15 - 37 U/L    Alk. phosphatase 58 45 - 117 U/L    Protein, total 6.4 6.4 - 8.2 g/dL    Albumin 2.1 (L) 3.5 - 5.0 g/dL    Globulin 4.3 (H) 2.0 - 4.0 g/dL    A-G Ratio 0.5 (L) 1.1 - 2.2     SED RATE (ESR)    Collection Time: 22  5:48 AM   Result Value Ref Range    Sed rate, automated 56 (H) 0 - 30 mm/hr       Imagin-4-22:   Echo:  Result status: Final result       Left Ventricle: Left ventricle size is normal. Normal wall thickness. Normal wall motion. Normal left ventricular systolic function with a visually estimated EF of 55 - 60%.   Left Atrium: Left atrium is mildly dilated.              I have personally reviewed the patients radiographs and have reviewed the reports:  22: CTA of chest: FINDINGS:  THYROID: No nodule. MEDIASTINUM: Small reactive lymph nodes are seen throughout the mediastinum.   JOAQUINA: No mass or lymphadenopathy. THORACIC AORTA: No dissection or aneurysm. PULMONARY ARTERIES: Normal in caliber. The pulmonary arteries are well enhanced. No evidence of pulmonary embolus. TRACHEA/BRONCHI: Patent. ESOPHAGUS: No wall thickening or dilatation. HEART: Normal in size. PLEURA: Small left pleural effusion. LUNGS: There is diffuse patchy opacification throughout the lungs. INCIDENTALLY IMAGED UPPER ABDOMEN: No focal abnormality. BONES: No destructive bone lesion.      IMPRESSION  1. Negative for pulmonary embolus. .  2. Extensive airspace disease throughout the lungs. 3. Small left pleural effusion.         Alexsandra Glass MD

## 2022-02-06 NOTE — PROGRESS NOTES
[address] [facility] Adult  Hospitalist Group                                                                                          Hospitalist Progress Note  Montez Nation MD  Answering service: [phone] OR [phone] from in house phone        Date of Service:  2022  NAME:  Parag Holloway  :  1950  MRN:  [id number]      Admission Summary:   Parag Holloway is a 70 y.o. female with pmh of HTN, Rheumatoid arthritis on MTX, and immunosuppresants presents with 3 days of worsening SOB. Patient was seen by her primary care physician today for complaints of shortness of breath, was noted to be hypoxic to the 80s on room air and sent to the emergency department for further evaluation, and concern for COVID-19. Patient has been vaccinated x3, however notably is immunocompromised with rheumatoid arthritis. Patient denies any fevers at home, she notes that she has been weak, increase shortness of breath. She is unable to barely walk up steps or even walk a few blocks without having to stop and catch her breath. She denied any lower extremity edema, para nocturnal dyspnea, orthopnea. But she does use 2 pillows to sleep at night, however is always done so. She does have a dry non productive cough. Denies any palpitations, chest pain, nausea vomiting or diarrhea. No musculoskeletal pain. Of note she does have a h/o pleural effusion which had to be drained multiple times (and cause was uncertain), she has since always had a \"tiny small one\" which never went away.     On arrival to the emergency department patient was found to be desaturating to the 80s on room air, placed on 2 L with improvement. Chest x-ray with bilateral infiltrates. COVID-19 PCR done and negative. Patient referred for admission to the hospitalist.    Chest CTA was done with evidence of pneumonia negative for PE       Interval history / Subjective:   Patient was seen and examined this morning.     Patient still coughing though seems better ,      . Assessment & Plan:     Acute hypoxic respiratory failure - requiring 2-4L secondary to below. And now 6 L/min  CT of the chest images were reviewed. I did not see any evidence of lobar pneumonia. Or evidence of CHF. There is bilateral airspace disease. She is currently on ceftriaxone and azithromycin. On IV Lasix presumably treating CHF but her cardiac work-up is negative  -LVEF 55 to 60% on echocardiogram, and no evidence of diastolic dysfunction. She is Covid PCR negative, flu negative  With history of rheumatoid arthritis in the past treated with methotrexate, leucovorin and golimumab   I am not very clear at this point of time etiology of her hypoxia, CRP is 13.10, and with a negative pro calcitonin I am wondering if she has an autoimmune autoimmune process like interstitial lung disease or drug induced ILD. ESR was ordered yesterday. It is 56. Pulmonology consult appreciated. We have started her on steroids now  Patient asking for discharge. She needs to be at least on 2 L oxygen to be discharged home on oxygen. Please continue to actively wean her off  Can also try intermittent IV Lasix, now currently on p.o. Lasix    Hyponatremia, possibly SIADH  - Monitor BMP    -Nephrology following closely with recommendations to restrict oral fluid intake   Avoid SSRI, NSAIDs, thiazides.     HTN   -continue losartan 100 mg daily    RA -   on MTX and leukovorin, q8week golimumab, as well as qweek denosumab . Hold for now     Further recommendations pending clinical course    PT/OT       CODE STATUS: Full Code   DVT PROPHYLAXIS: Lovenox  FUNCTIONAL STATUS PRIOR TO HOSPITALIZATION: Fully active and ambulatory; able to carry on all self-care without restriction. EARLY MOBILITY ASSESSMENT: Recommend routine ambulation while hospitalized with the assistance of nursing staff  ANTICIPATED DISCHARGE: 24-48 hours.   EMERGENCY CONTACT/SURROGATE DECISION MAKER:           Hospital Problems Date Reviewed: 4/17/2018          Codes Class Noted POA    Respiratory failure [facility]) ICD-10-CM: J96.90  ICD-9-CM: 518.81  2/2/2022 Unknown                Review of Systems:   A comprehensive review of systems was negative except for that written in the HPI. Vital Signs:    Last 24hrs VS reviewed since prior progress note. Most recent are:  Visit Vitals  /74   Pulse (!) 105   Temp 98.2 °F (36.8 °C)   Resp 14   Ht 5' 5\" (1.651 m)   Wt 91.5 kg (201 lb 11.2 oz)   SpO2 93%   BMI 33.56 kg/m²         Intake/Output Summary (Last 24 hours) at 2/6/2022 1411  Last data filed at 2/5/2022 2323  Gross per 24 hour   Intake 450 ml   Output    Net 450 ml        Physical Examination:     I had a face to face encounter with this patient and independently examined them on 2/6/2022 as outlined below:          Constitutional:  No acute distress, cooperative, pleasant    ENT:  Oral mucosa moist, oropharynx benign. Resp:  Crackles bilateral lower lobes   CV:  Regular rhythm, normal rate, no murmurs, gallops, rubs    GI:  Soft, non distended, non tender. normoactive bowel sounds, no hepatosplenomegaly     Musculoskeletal:  1+ pitting edema in the lower extremities, warm, 2+ pulses throughout    Neurologic:  Moves all extremities.   AAOx3, CN II-XII reviewed            Data Review:    Review and/or order of clinical lab test      Labs:     Recent Labs     02/06/22 0548 02/05/22  0645   WBC 10.1 9.7   HGB 11.7 11.9   HCT 34.4* 34.6*    364     Recent Labs     02/06/22  0548 02/05/22  0645 02/04/22  0339   * 126* 129*   K 3.9 4.1 3.6   CL 95* 93* 96*   CO2 30 27 28   BUN 9 11 14   CREA 0.42* 0.36* 0.51*   * 104* 101*   CA 8.8 8.0* 8.3*   MG 2.6*  --  2.3   PHOS  --  2.6  --    URICA  --   --  3.0     Recent Labs     02/06/22  0548 02/05/22  0645 02/04/22  0339   ALT 46 41 37   AP 58 59 58   TBILI 0.4 0.7 0.5   TP 6.4 5.8* 6.6   ALB 2.1* 2.4* 2.3*   GLOB 4.3* 3.4 4.3*     No results for input(s): INR, PTP, APTT, INREXT, INREXT in the last 72 hours. No results for input(s): FE, TIBC, PSAT, FERR in the last 72 hours. No results found for: FOL, RBCF   No results for input(s): PH, PCO2, PO2 in the last 72 hours. No results for input(s): CPK, CKNDX, TROIQ in the last 72 hours.     No lab exists for component: CPKMB  No results found for: CHOL, CHOLX, CHLST, CHOLV, HDL, HDLP, LDL, LDLC, DLDLP, TGLX, TRIGL, TRIGP, CHHD, CHHDX  No results found for: GLUCPOC  No results found for: COLOR, APPRN, SPGRU, REFSG, MANE, PROTU, GLUCU, KETU, BILU, UROU, NIMA, LEUKU, GLUKE, EPSU, BACTU, WBCU, RBCU, CASTS, UCRY      Medications Reviewed:     Current Facility-Administered Medications   Medication Dose Route Frequency    furosemide (LASIX) tablet 40 mg  40 mg Oral DAILY    predniSONE (DELTASONE) tablet 20 mg  20 mg Oral BID WITH MEALS    guaiFENesin ER (MUCINEX) tablet 600 mg  600 mg Oral Q12H    albuterol-ipratropium (DUO-NEB) 2.5 MG-0.5 MG/3 ML  3 mL Nebulization Q6H PRN    fluticasone propionate (FLONASE) 50 mcg/actuation nasal spray 2 Spray  2 Spray Both Nostrils DAILY    sodium chloride (OCEAN) 0.65 % nasal squeeze bottle 2 Spray  2 Spray Both Nostrils Q2H PRN    losartan (COZAAR) tablet 100 mg  100 mg Oral DAILY    sodium chloride (NS) flush 5-40 mL  5-40 mL IntraVENous Q8H    sodium chloride (NS) flush 5-40 mL  5-40 mL IntraVENous PRN    acetaminophen (TYLENOL) tablet 650 mg  650 mg Oral Q6H PRN    Or    acetaminophen (TYLENOL) suppository 650 mg  650 mg Rectal Q6H PRN    polyethylene glycol (MIRALAX) packet 17 g  17 g Oral DAILY PRN    ondansetron (ZOFRAN ODT) tablet 4 mg  4 mg Oral Q8H PRN    Or    ondansetron (ZOFRAN) injection 4 mg  4 mg IntraVENous Q6H PRN    enoxaparin (LOVENOX) injection 40 mg  40 mg SubCUTAneous Q24H    cefTRIAXone (ROCEPHIN) 2 g in sterile water (preservative free) 20 mL IV syringe  2 g IntraVENous Q24H    azithromycin (ZITHROMAX) 500 mg in 0.9% sodium chloride 250 mL (VIAL-MATE)  500 mg IntraVENous Q24H     ______________________________________________________________________  EXPECTED LENGTH OF STAY: 4d 2h  ACTUAL LENGTH OF STAY:          Keely Taylor MD

## 2022-02-07 LAB
ANION GAP SERPL CALC-SCNC: 3 MMOL/L (ref 5–15)
BACTERIA SPEC CULT: ABNORMAL
BACTERIA SPEC CULT: ABNORMAL
BUN SERPL-MCNC: 17 MG/DL (ref 6–20)
BUN/CREAT SERPL: 31 (ref 12–20)
C3 SERPL-MCNC: 157 MG/DL (ref 82–167)
C4 SERPL-MCNC: 19 MG/DL (ref 12–38)
CALCIUM SERPL-MCNC: 9 MG/DL (ref 8.5–10.1)
CHLORIDE SERPL-SCNC: 100 MMOL/L (ref 97–108)
CO2 SERPL-SCNC: 30 MMOL/L (ref 21–32)
COMMENT, HOLDF: NORMAL
CREAT SERPL-MCNC: 0.55 MG/DL (ref 0.55–1.02)
GLUCOSE SERPL-MCNC: 86 MG/DL (ref 65–100)
GRAM STN SPEC: ABNORMAL
POTASSIUM SERPL-SCNC: 3.8 MMOL/L (ref 3.5–5.1)
SAMPLES BEING HELD,HOLD: NORMAL
SERVICE CMNT-IMP: ABNORMAL
SODIUM SERPL-SCNC: 133 MMOL/L (ref 136–145)

## 2022-02-07 PROCEDURE — 74011250637 HC RX REV CODE- 250/637: Performed by: INTERNAL MEDICINE

## 2022-02-07 PROCEDURE — 36415 COLL VENOUS BLD VENIPUNCTURE: CPT

## 2022-02-07 PROCEDURE — 74011250637 HC RX REV CODE- 250/637: Performed by: NURSE PRACTITIONER

## 2022-02-07 PROCEDURE — 74011000250 HC RX REV CODE- 250: Performed by: INTERNAL MEDICINE

## 2022-02-07 PROCEDURE — 80048 BASIC METABOLIC PNL TOTAL CA: CPT

## 2022-02-07 PROCEDURE — 74011636637 HC RX REV CODE- 636/637: Performed by: INTERNAL MEDICINE

## 2022-02-07 PROCEDURE — 77010033678 HC OXYGEN DAILY

## 2022-02-07 PROCEDURE — 65660000000 HC RM CCU STEPDOWN

## 2022-02-07 PROCEDURE — 74011250636 HC RX REV CODE- 250/636: Performed by: INTERNAL MEDICINE

## 2022-02-07 RX ADMIN — LOSARTAN POTASSIUM 100 MG: 50 TABLET, FILM COATED ORAL at 08:42

## 2022-02-07 RX ADMIN — PREDNISONE 20 MG: 20 TABLET ORAL at 18:33

## 2022-02-07 RX ADMIN — SODIUM CHLORIDE, PRESERVATIVE FREE 10 ML: 5 INJECTION INTRAVENOUS at 14:00

## 2022-02-07 RX ADMIN — FLUTICASONE PROPIONATE 2 SPRAY: 50 SPRAY, METERED NASAL at 08:43

## 2022-02-07 RX ADMIN — GUAIFENESIN 600 MG: 600 TABLET, EXTENDED RELEASE ORAL at 08:42

## 2022-02-07 RX ADMIN — FUROSEMIDE 40 MG: 40 TABLET ORAL at 08:42

## 2022-02-07 RX ADMIN — PREDNISONE 20 MG: 20 TABLET ORAL at 08:42

## 2022-02-07 RX ADMIN — ENOXAPARIN SODIUM 40 MG: 100 INJECTION SUBCUTANEOUS at 17:04

## 2022-02-07 RX ADMIN — GUAIFENESIN 600 MG: 600 TABLET, EXTENDED RELEASE ORAL at 21:25

## 2022-02-07 RX ADMIN — DIPHENHYDRAMINE HCL 25 MG: 25 CAPSULE ORAL at 21:25

## 2022-02-07 NOTE — PROGRESS NOTES
[address] [facility] Adult  Hospitalist Group                                                                                          Hospitalist Progress Note  Joe Karanarcadio   Answering service: [phone] from in house phone        Date of Service:  2022  NAME:  Umesh Bautista  :  1950  MRN:  [id number]      Admission Summary:   Umesh Bautista is a 70 y.o. female with pmh of HTN, Rheumatoid arthritis on MTX, and immunosuppresants presents with 3 days of worsening SOB. Patient was seen by her primary care physician today for complaints of shortness of breath, was noted to be hypoxic to the 80s on room air and sent to the emergency department for further evaluation, and concern for COVID-19. Patient has been vaccinated x3, however notably is immunocompromised with rheumatoid arthritis. Patient denies any fevers at home, she notes that she has been weak, increase shortness of breath. She is unable to barely walk up steps or even walk a few blocks without having to stop and catch her breath. She denied any lower extremity edema, para nocturnal dyspnea, orthopnea. But she does use 2 pillows to sleep at night, however is always done so. She does have a dry non productive cough. Denies any palpitations, chest pain, nausea vomiting or diarrhea. No musculoskeletal pain. Of note she does have a h/o pleural effusion which had to be drained multiple times (and cause was uncertain), she has since always had a \"tiny small one\" which never went away.     On arrival to the emergency department patient was found to be desaturating to the 80s on room air, placed on 2 L with improvement. Chest x-ray with bilateral infiltrates. COVID-19 PCR done and negative. Patient referred for admission to the hospitalist.    Chest CTA was done with evidence of pneumonia negative for PE       Interval history / Subjective:   Patient was seen and examined this morning. She reports feeling better overall. She is anxious about going home soon. Patient denies fever, chills, chest pain, abdominal pain, nausea, vomiting and diarrhea. .        Assessment & Plan:     Acute hypoxic respiratory failure - requiring 2-4L secondary to below. Possible autoimmune interstitial lung disease versus medication induced  Community-acquired pneumonia completed treatment  Possible Acute diastolic CHF, CVAT IV diuresis and was transition to oral diuresis, NYHA I  DC ceftriaxone and azithromycin. She is Covid PCR negative, flu negative  Pulmonary following with recommendation to continue with steroid therapy  Patient asking for discharge. She needs to be at least on 2 L oxygen to be discharged home on oxygen. Wean O2  Continue with p.o. Lasix    Hyponatremia, possibly SIADH-improving  - Monitor BMP    -Nephrology following closely with recommendations to restrict oral fluid intake   Avoid SSRI, NSAIDs, thiazides.     HTN   -continue losartan 100 mg daily    RA -   on MTX and leukovorin, q8week golimumab, as well as qweek denosumab . Hold for now     Further recommendations pending clinical course    PT/OT       CODE STATUS: Full Code   DVT PROPHYLAXIS: Lovenox  FUNCTIONAL STATUS PRIOR TO HOSPITALIZATION: Fully active and ambulatory; able to carry on all self-care without restriction. EARLY MOBILITY ASSESSMENT: Recommend routine ambulation while hospitalized with the assistance of nursing staff  ANTICIPATED DISCHARGE: 24-48 hours. EMERGENCY CONTACT/SURROGATE DECISION MAKER:           Hospital Problems  Date Reviewed: 4/17/2018          Codes Class Noted POA    Respiratory failure [facility]) ICD-10-CM: J96.90  ICD-9-CM: 518.81  2/2/2022 Unknown                Review of Systems:   A comprehensive review of systems was negative except for that written in the HPI. Vital Signs:    Last 24hrs VS reviewed since prior progress note.  Most recent are:  Visit Vitals  /72 (BP 1 Location: Right upper arm, BP Patient Position: Sitting)   Pulse (!) 103   Temp 98.4 °F (36.9 °C)   Resp 22   Ht 5' 5\" (1.651 m)   Wt 91.5 kg (201 lb 11.2 oz)   SpO2 94%   BMI 33.56 kg/m²         Intake/Output Summary (Last 24 hours) at 2/7/2022 1455  Last data filed at 2/6/2022 2309  Gross per 24 hour   Intake 520 ml   Output    Net 520 ml        Physical Examination:     I had a face to face encounter with this patient and independently examined them on 2/7/2022 as outlined below:          Constitutional:  No acute distress, cooperative, pleasant    ENT:  Oral mucosa moist, oropharynx benign. Resp:  Crackles bilateral lower lobes   CV:  Regular rhythm, normal rate, no murmurs, gallops, rubs    GI:  Soft, non distended, non tender. normoactive bowel sounds, no hepatosplenomegaly     Musculoskeletal:  1+ pitting edema in the lower extremities, warm, 2+ pulses throughout    Neurologic:  Moves all extremities. AAOx3, CN II-XII reviewed            Data Review:    Review and/or order of clinical lab test      Labs:     Recent Labs     02/06/22  0548 02/05/22  0645   WBC 10.1 9.7   HGB 11.7 11.9   HCT 34.4* 34.6*    364     Recent Labs     02/07/22  0542 02/06/22  0548 02/05/22  0645   * 130* 126*   K 3.8 3.9 4.1    95* 93*   CO2 30 30 27   BUN 17 9 11   CREA 0.55 0.42* 0.36*   GLU 86 101* 104*   CA 9.0 8.8 8.0*   MG  --  2.6*  --    PHOS  --   --  2.6     Recent Labs     02/06/22  0548 02/05/22  0645   ALT 46 41   AP 58 59   TBILI 0.4 0.7   TP 6.4 5.8*   ALB 2.1* 2.4*   GLOB 4.3* 3.4     No results for input(s): INR, PTP, APTT, INREXT, INREXT in the last 72 hours. No results for input(s): FE, TIBC, PSAT, FERR in the last 72 hours. No results found for: FOL, RBCF   No results for input(s): PH, PCO2, PO2 in the last 72 hours. No results for input(s): CPK, CKNDX, TROIQ in the last 72 hours.     No lab exists for component: CPKMB  No results found for: CHOL, CHOLX, CHLST, CHOLV, HDL, HDLP, LDL, LDLC, DLDLP, TGLX, TRIGL, TRIGP, CHHD, CHHDX  No results found for: GLUCPOC  No results found for: COLOR, APPRN, SPGRU, REFSG, MANE, PROTU, GLUCU, KETU, BILU, UROU, NIMA, LEUKU, GLUKE, EPSU, BACTU, WBCU, RBCU, CASTS, UCRY      Medications Reviewed:     Current Facility-Administered Medications   Medication Dose Route Frequency    furosemide (LASIX) tablet 40 mg  40 mg Oral DAILY    diphenhydrAMINE (BENADRYL) capsule 25 mg  25 mg Oral Q6H PRN    predniSONE (DELTASONE) tablet 20 mg  20 mg Oral BID WITH MEALS    guaiFENesin ER (MUCINEX) tablet 600 mg  600 mg Oral Q12H    albuterol-ipratropium (DUO-NEB) 2.5 MG-0.5 MG/3 ML  3 mL Nebulization Q6H PRN    fluticasone propionate (FLONASE) 50 mcg/actuation nasal spray 2 Spray  2 Spray Both Nostrils DAILY    sodium chloride (OCEAN) 0.65 % nasal squeeze bottle 2 Spray  2 Spray Both Nostrils Q2H PRN    losartan (COZAAR) tablet 100 mg  100 mg Oral DAILY    sodium chloride (NS) flush 5-40 mL  5-40 mL IntraVENous Q8H    sodium chloride (NS) flush 5-40 mL  5-40 mL IntraVENous PRN    acetaminophen (TYLENOL) tablet 650 mg  650 mg Oral Q6H PRN    Or    acetaminophen (TYLENOL) suppository 650 mg  650 mg Rectal Q6H PRN    polyethylene glycol (MIRALAX) packet 17 g  17 g Oral DAILY PRN    ondansetron (ZOFRAN ODT) tablet 4 mg  4 mg Oral Q8H PRN    Or    ondansetron (ZOFRAN) injection 4 mg  4 mg IntraVENous Q6H PRN    enoxaparin (LOVENOX) injection 40 mg  40 mg SubCUTAneous Q24H     ______________________________________________________________________  EXPECTED LENGTH OF STAY: 4d 2h  ACTUAL LENGTH OF STAY:          5                 Saqib Jean-Baptiste DO

## 2022-02-07 NOTE — PROGRESS NOTES
NAME: Mone Campbell        :  1950        MRN:  [id number]         Assessment:  Hyponatremia- multifactorial etiology; excessive free water intake, poor solute intake and SIADH. Urine studies (high urine Na and U. Osm) + lowish uric acid. TSH is nl     HTN  SOB/Hypoxia  PNA- Covid testing is negative     Sodium better, 126 to 130 to 133 today     Plan/Recommendations:  Stable for discharge from my standpoint-> should get BMP with PCP in 1wk  Continue FR -> can liberate to 1.5L  Ct PO Lasix 40 daily; home on this for now  Encourage increase solute intake   Avoid thiazides, SSRI, NSAIDs      Subjective:     Chief Complaint: Wishes to go home. No complaints. \" I feel fine\". Review of Systems:    Symptom Y/N Comments  Symptom Y/N Comments   Fever/Chills    Chest Pain     Poor Appetite    Edema     Cough    Abdominal Pain     Sputum    Joint Pain     SOB/AL    Pruritis/Rash     Nausea/vomit    Tolerating PT/OT     Diarrhea    Tolerating Diet     Constipation    Other       Could not obtain due to:      Objective:     VITALS:   Last 24hrs VS reviewed since prior progress note.  Most recent are:  Visit Vitals  /80 (BP 1 Location: Right upper arm, BP Patient Position: At rest)   Pulse 91   Temp 98 °F (36.7 °C)   Resp 20   Ht 5' 5\" (1.651 m)   Wt 91.5 kg (201 lb 11.2 oz)   SpO2 93%   BMI 33.56 kg/m²       Intake/Output Summary (Last 24 hours) at 2022 0756  Last data filed at 2022 2309  Gross per 24 hour   Intake 520 ml   Output    Net 520 ml      Telemetry Reviewed:     PHYSICAL EXAM:  General: NAD      Lab Data Reviewed: (see below)    Medications Reviewed: (see below)    PMH/SH reviewed - no change compared to H&P  ________________________________________________________________________  Care Plan discussed with:  Patient Y    Family      RN     Care Manager                    Consultant:          Comments   >50% of visit spent in counseling and coordination of care       ________________________________________________________________________  Isaac Ziegler MD     Procedures: see electronic medical records for all procedures/Xrays and details which  were not copied into this note but were reviewed prior to creation of Plan. LABS:  Recent Labs     02/06/22 0548 02/05/22 0645   WBC 10.1 9.7   HGB 11.7 11.9   HCT 34.4* 34.6*    364     Recent Labs     02/07/22 0542 02/06/22 0548 02/05/22 0645   * 130* 126*   K 3.8 3.9 4.1    95* 93*   CO2 30 30 27   BUN 17 9 11   CREA 0.55 0.42* 0.36*   GLU 86 101* 104*   CA 9.0 8.8 8.0*   MG  --  2.6*  --    PHOS  --   --  2.6     Recent Labs     02/06/22 0548 02/05/22 0645   AP 58 59   TP 6.4 5.8*   ALB 2.1* 2.4*   GLOB 4.3* 3.4     No results for input(s): INR, PTP, APTT, INREXT, INREXT in the last 72 hours. No results for input(s): FE, TIBC, PSAT, FERR in the last 72 hours. No results found for: FOL, RBCF   No results for input(s): PH, PCO2, PO2 in the last 72 hours. No results for input(s): CPK, CKMB in the last 72 hours.     No lab exists for component: TROPONINI  No components found for: GLPOC  No results found for: COLOR, APPRN, SPGRU, REFSG, MANE, PROTU, GLUCU, KETU, BILU, UROU, NIMA, LEUKU, GLUKE, EPSU, BACTU, WBCU, RBCU, CASTS, UCRY    MEDICATIONS:  Current Facility-Administered Medications   Medication Dose Route Frequency    furosemide (LASIX) tablet 40 mg  40 mg Oral DAILY    diphenhydrAMINE (BENADRYL) capsule 25 mg  25 mg Oral Q6H PRN    predniSONE (DELTASONE) tablet 20 mg  20 mg Oral BID WITH MEALS    guaiFENesin ER (MUCINEX) tablet 600 mg  600 mg Oral Q12H    albuterol-ipratropium (DUO-NEB) 2.5 MG-0.5 MG/3 ML  3 mL Nebulization Q6H PRN    fluticasone propionate (FLONASE) 50 mcg/actuation nasal spray 2 Spray  2 Spray Both Nostrils DAILY    sodium chloride (OCEAN) 0.65 % nasal squeeze bottle 2 Spray  2 Spray Both Nostrils Q2H PRN    losartan (COZAAR) tablet 100 mg  100 mg Oral DAILY    sodium chloride (NS) flush 5-40 mL  5-40 mL IntraVENous Q8H    sodium chloride (NS) flush 5-40 mL  5-40 mL IntraVENous PRN    acetaminophen (TYLENOL) tablet 650 mg  650 mg Oral Q6H PRN    Or    acetaminophen (TYLENOL) suppository 650 mg  650 mg Rectal Q6H PRN    polyethylene glycol (MIRALAX) packet 17 g  17 g Oral DAILY PRN    ondansetron (ZOFRAN ODT) tablet 4 mg  4 mg Oral Q8H PRN    Or    ondansetron (ZOFRAN) injection 4 mg  4 mg IntraVENous Q6H PRN    enoxaparin (LOVENOX) injection 40 mg  40 mg SubCUTAneous Q24H    cefTRIAXone (ROCEPHIN) 2 g in sterile water (preservative free) 20 mL IV syringe  2 g IntraVENous Q24H    azithromycin (ZITHROMAX) 500 mg in 0.9% sodium chloride 250 mL (VIAL-MATE)  500 mg IntraVENous Q24H

## 2022-02-07 NOTE — PROGRESS NOTES
Pulmonary, Critical Care, and Sleep Medicine    Progress Note    Name: Rita Dexter MRN: [id number]   : 1950 [facility] [place]   Date: 2022        IMPRESSION:   · At Risk for ILD from Rheumatoid and from Mtx related lung disease (on it for > 6mts). No overt infection. · Dyspnea on Exertion for several days. · Acute Hypoxic Respiratory Failure: Hypoxia, noted to have exertional hypoxia. Noted at PCP. Pox was 89% on RA at PCP office. Was noted to have pox of 92% on RA here. · Chronic Left Pleural effusion, multiple prior thoracentesis. · Rheumatoid Arthritis and on Mtx. · Immunocompromised. · Had Covid Vaccine and booster. · Procalcitonin level is low, does not suggest bacterial infection. · Noted to have hypertension, 168/114 on arrival.   · Covid PCR here was negative. · Anemia: hgb of 11.9  · Hyponatremia  · Increased CRP  · Allergy to Iodine. · Ex Smoker: quit 3-4 year ago. RECOMMENDATIONS:   · On steroids for possible Auto immune or Drug induced lung disease. · Hold Mtx for this week. · Will need to get PFTs as an outpt for baseline. She sees Dr. Steve Chacon as outpt Pulmonary MD.   · Transition to PO steroids at 40mg daily until follow up; please also place on // bactrim proph   · Agree with Zithromax. · Diurese as able  · Wean oxygen to keep pox over 90%. · Will likely need at least ambulatory O2 at discharge        Feeling better  Only on 2lpm    2/  Pt feels a little better. Still with cough, worse in am.   Still on moderate oxygen at 6L with Pox of 95%. No headaches. No chest pain, no back pain. No leg pain. Has been tolerating po intake well. Reviewed renal notes. They feel she is ready for discharge. PCP: Dr. Kenisha Boswell    Subjective:   Cc: Dyspnea on Exertion, Hypoxia. HPI:   This patient has been seen and evaluated at the request of Dr. Pattie Quarles for above.  Patient is a 70 y.o. female who was admitted on 2022 for worsening shortness of breath. Noted to have hypoxia with exertion. Pt has hx of Rheumatoid Arthritis. ON Mtx. Had prior Covid Vaccines. Denies fevers. Has generalized weakness. Increasing shortness of breath. Worsening AL. Has a dry cough worse for the last 3 weeks. Had significant dyspnea with climbing a flight of stairs about 1 week prior. NO issues with fevers, chills, sweats. NO sinus issues. NO hemoptysis. NO GERD or aspiration. No joint pain or swelling. Otherwise had been feeling near baseline. Was noted to have crackles in all lung fields. Past Medical History:   Diagnosis Date    HLD (hyperlipidemia)     HTN (hypertension)     Rheumatoid arthritis ([facility] [address]       Past Surgical History:   Procedure Laterality Date    HX APPENDECTOMY      HX BREAST BIOPSY Left     Surgical BX (Over 20yrs ago) - BENIGN     HX OTHER SURGICAL      Breast Biopsy      Prior to Admission medications    Medication Sig Start Date End Date Taking? Authorizing Provider   folic acid (FOLVITE) 1 mg tablet Take 1 mg by mouth four (4) days a week. (take on non-leucovorin days)   Yes Provider, Historical   ibuprofen (MOTRIN) 600 mg tablet Take 600 mg by mouth every six (6) hours as needed for Pain. Yes Provider, Historical   denosumab (Prolia) 60 mg/mL injection 60 mg by SubCUTAneous route. Yes Provider, Historical   golimumab (Simponi ARIA) 12.5 mg/mL soln solution 180 mg by IntraVENous route every two (2) months. Indications: rheumatoid arthritis   Yes Provider, Historical   telmisartan (MICARDIS) 80 mg tablet Take 80 mg by mouth daily. 8/2/21  Yes Provider, Historical   leucovorin calcium (WELLCOVORIN) 5 mg tablet Take 5 mg by mouth three (3) days a week. (daily x 3 days after methotrexate doses) 3/28/18  Yes Provider, Historical   methotrexate (RHEUMATREX) 2.5 mg tablet Take 17.5 mg by mouth every seven (7) days.  2/9/18  Yes Provider, Historical   predniSONE (DELTASONE) 10 mg tablet Take 10 mg by mouth daily as needed (rheumatoid arthritis). 18  Yes Provider, Historical     Allergies   Allergen Reactions    Iodine Rash      Social History     Tobacco Use    Smoking status: Former Smoker     Packs/day: 1.00     Years: 40.00     Pack years: 40.00     Types: Cigarettes     Quit date: 10/26/2016     Years since quittin.2    Smokeless tobacco: Never Used   Substance Use Topics    Alcohol use: Yes      History reviewed. No pertinent family history. Current Facility-Administered Medications   Medication Dose Route Frequency    furosemide (LASIX) tablet 40 mg  40 mg Oral DAILY    predniSONE (DELTASONE) tablet 20 mg  20 mg Oral BID WITH MEALS    guaiFENesin ER (MUCINEX) tablet 600 mg  600 mg Oral Q12H    fluticasone propionate (FLONASE) 50 mcg/actuation nasal spray 2 Spray  2 Spray Both Nostrils DAILY    losartan (COZAAR) tablet 100 mg  100 mg Oral DAILY    sodium chloride (NS) flush 5-40 mL  5-40 mL IntraVENous Q8H    enoxaparin (LOVENOX) injection 40 mg  40 mg SubCUTAneous Q24H       Review of Systems:  Constitutional: positive for fatigue  Eyes: negative  Ears, nose, mouth, throat, and face: negative  Respiratory: positive for cough, wheezing or dyspnea on exertion  Cardiovascular: negative  Gastrointestinal: negative  Genitourinary:negative  Integument/breast: negative  Hematologic/lymphatic: negative  Musculoskeletal:negative  Neurological: negative  Behavioral/Psych: negative  Endocrine: negative  Allergic/Immunologic: negative    Objective:   Vital Signs:    Visit Vitals  /71 (BP 1 Location: Right upper arm, BP Patient Position: Sitting)   Pulse (!) 110   Temp 97.8 °F (36.6 °C)   Resp 18   Ht 5' 5\" (1.651 m)   Wt 91.5 kg (201 lb 11.2 oz)   SpO2 96%   BMI 33.56 kg/m²       O2 Device: Nasal cannula   O2 Flow Rate (L/min): 4 l/min   Temp (24hrs), Av °F (36.7 °C), Min:97.6 °F (36.4 °C), Max:98.2 °F (36.8 °C)       Intake/Output:   Last shift:      No intake/output data recorded.   Last 3 shifts:  1901 - 02/07 0700  In: 970 [P.O.:450; I.V.:520]  Out: -     Intake/Output Summary (Last 24 hours) at 2/7/2022 1144  Last data filed at 2/6/2022 2309  Gross per 24 hour   Intake 520 ml   Output    Net 520 ml      Physical Exam:   General:  Alert, cooperative, no distress, appears stated age. On 6L oxygen with pox of 95%. Has a wet cough when seen this am.    Head:  Normocephalic, without obvious abnormality, atraumatic. Eyes:  Conjunctivae/corneas clear. PERRL, EOMs intact. Nose: Nares normal. Septum midline. Mucosa normal. No drainage or sinus tenderness. Throat: Lips, mucosa, and tongue normal. Teeth and gums normal.   Neck: Supple, symmetrical, trachea midline, no adenopathy, thyroid: no enlargment/tenderness/nodules, no carotid bruit and no JVD. Back:   Symmetric, no curvature. ROM normal.   Lungs:   Basilar crackles, seems worse on the right lung base more than left. Seems comfortable. Chest wall:  No tenderness or deformity. Heart:  Regular rate and rhythm, S1, S2 normal, no murmur, click, rub or gallop. Abdomen:   Soft, non-tender. Bowel sounds normal. No masses,  No organomegaly. Obese. Extremities: Extremities normal, atraumatic, no cyanosis or edema. Pulses: 2+ and symmetric all extremities. Skin: Skin color, texture, turgor normal. No rashes or lesions   Lymph nodes: Cervical, supraclavicular, and axillary nodes normal.   Neurologic: Grossly nonfocal, motor intact.       Data review:     Recent Results (from the past 24 hour(s))   METABOLIC PANEL, BASIC    Collection Time: 02/07/22  5:42 AM   Result Value Ref Range    Sodium 133 (L) 136 - 145 mmol/L    Potassium 3.8 3.5 - 5.1 mmol/L    Chloride 100 97 - 108 mmol/L    CO2 30 21 - 32 mmol/L    Anion gap 3 (L) 5 - 15 mmol/L    Glucose 86 65 - 100 mg/dL    BUN 17 6 - 20 MG/DL    Creatinine 0.55 0.55 - 1.02 MG/DL    BUN/Creatinine ratio 31 (H) 12 - 20      GFR est AA >60 >60 ml/min/1.73m2    GFR est non-AA >60 >60 ml/min/1.73m2    Calcium 9.0 8.5 - 10.1 MG/DL   SAMPLES BEING HELD    Collection Time: 22  5:42 AM   Result Value Ref Range    SAMPLES BEING HELD 1LAVB     COMMENT        Add-on orders for these samples will be processed based on acceptable specimen integrity and analyte stability, which may vary by analyte. Imagin22:   Echo:  Result status: Final result       Left Ventricle: Left ventricle size is normal. Normal wall thickness. Normal wall motion. Normal left ventricular systolic function with a visually estimated EF of 55 - 60%.   Left Atrium: Left atrium is mildly dilated.              I have personally reviewed the patients radiographs and have reviewed the reports:  22: CTA of chest: FINDINGS:  THYROID: No nodule. MEDIASTINUM: Small reactive lymph nodes are seen throughout the mediastinum. JOAQUINA: No mass or lymphadenopathy. THORACIC AORTA: No dissection or aneurysm. PULMONARY ARTERIES: Normal in caliber. The pulmonary arteries are well enhanced. No evidence of pulmonary embolus. TRACHEA/BRONCHI: Patent. ESOPHAGUS: No wall thickening or dilatation. HEART: Normal in size. PLEURA: Small left pleural effusion. LUNGS: There is diffuse patchy opacification throughout the lungs. INCIDENTALLY IMAGED UPPER ABDOMEN: No focal abnormality. BONES: No destructive bone lesion.      IMPRESSION  1. Negative for pulmonary embolus. .  2. Extensive airspace disease throughout the lungs. 3. Small left pleural effusion.         Jonathan Arboleda PA-C

## 2022-02-08 VITALS
BODY MASS INDEX: 33.61 KG/M2 | HEIGHT: 65 IN | RESPIRATION RATE: 20 BRPM | HEART RATE: 102 BPM | WEIGHT: 201.7 LBS | OXYGEN SATURATION: 87 % | DIASTOLIC BLOOD PRESSURE: 78 MMHG | SYSTOLIC BLOOD PRESSURE: 136 MMHG | TEMPERATURE: 98.4 F

## 2022-02-08 LAB
ALBUMIN SERPL-MCNC: 2.2 G/DL (ref 3.5–5)
ALBUMIN/GLOB SERPL: 0.5 {RATIO} (ref 1.1–2.2)
ALP SERPL-CCNC: 59 U/L (ref 45–117)
ALT SERPL-CCNC: 65 U/L (ref 12–78)
ANION GAP SERPL CALC-SCNC: 4 MMOL/L (ref 5–15)
AST SERPL-CCNC: 35 U/L (ref 15–37)
BASOPHILS # BLD: 0.1 K/UL (ref 0–0.1)
BASOPHILS NFR BLD: 1 % (ref 0–1)
BILIRUB SERPL-MCNC: 0.4 MG/DL (ref 0.2–1)
BUN SERPL-MCNC: 16 MG/DL (ref 6–20)
BUN/CREAT SERPL: 27 (ref 12–20)
CALCIUM SERPL-MCNC: 9.1 MG/DL (ref 8.5–10.1)
CHLORIDE SERPL-SCNC: 99 MMOL/L (ref 97–108)
CO2 SERPL-SCNC: 31 MMOL/L (ref 21–32)
CREAT SERPL-MCNC: 0.59 MG/DL (ref 0.55–1.02)
DIFFERENTIAL METHOD BLD: ABNORMAL
EOSINOPHIL # BLD: 0 K/UL (ref 0–0.4)
EOSINOPHIL NFR BLD: 0 % (ref 0–7)
ERYTHROCYTE [DISTWIDTH] IN BLOOD BY AUTOMATED COUNT: 12 % (ref 11.5–14.5)
GLOBULIN SER CALC-MCNC: 4.3 G/DL (ref 2–4)
GLUCOSE SERPL-MCNC: 109 MG/DL (ref 65–100)
HCT VFR BLD AUTO: 39 % (ref 35–47)
HGB BLD-MCNC: 12.5 G/DL (ref 11.5–16)
IMM GRANULOCYTES # BLD AUTO: 0 K/UL (ref 0–0.04)
IMM GRANULOCYTES NFR BLD AUTO: 0 % (ref 0–0.5)
LYMPHOCYTES # BLD: 2.1 K/UL (ref 0.8–3.5)
LYMPHOCYTES NFR BLD: 22 % (ref 12–49)
MAGNESIUM SERPL-MCNC: 2.3 MG/DL (ref 1.6–2.4)
MCH RBC QN AUTO: 32.8 PG (ref 26–34)
MCHC RBC AUTO-ENTMCNC: 32.1 G/DL (ref 30–36.5)
MCV RBC AUTO: 102.4 FL (ref 80–99)
MONOCYTES # BLD: 0.7 K/UL (ref 0–1)
MONOCYTES NFR BLD: 8 % (ref 5–13)
NEUTS SEG # BLD: 6.6 K/UL (ref 1.8–8)
NEUTS SEG NFR BLD: 69 % (ref 32–75)
NRBC # BLD: 0 K/UL (ref 0–0.01)
NRBC BLD-RTO: 0 PER 100 WBC
PLATELET # BLD AUTO: 437 K/UL (ref 150–400)
PMV BLD AUTO: 8.7 FL (ref 8.9–12.9)
POTASSIUM SERPL-SCNC: 4 MMOL/L (ref 3.5–5.1)
PROT SERPL-MCNC: 6.5 G/DL (ref 6.4–8.2)
RBC # BLD AUTO: 3.81 M/UL (ref 3.8–5.2)
SODIUM SERPL-SCNC: 134 MMOL/L (ref 136–145)
WBC # BLD AUTO: 9.5 K/UL (ref 3.6–11)

## 2022-02-08 PROCEDURE — 74011250637 HC RX REV CODE- 250/637: Performed by: NURSE PRACTITIONER

## 2022-02-08 PROCEDURE — 36415 COLL VENOUS BLD VENIPUNCTURE: CPT

## 2022-02-08 PROCEDURE — 74011250637 HC RX REV CODE- 250/637: Performed by: INTERNAL MEDICINE

## 2022-02-08 PROCEDURE — 85025 COMPLETE CBC W/AUTO DIFF WBC: CPT

## 2022-02-08 PROCEDURE — 83735 ASSAY OF MAGNESIUM: CPT

## 2022-02-08 PROCEDURE — 74011636637 HC RX REV CODE- 636/637: Performed by: INTERNAL MEDICINE

## 2022-02-08 PROCEDURE — 77010033678 HC OXYGEN DAILY

## 2022-02-08 PROCEDURE — 80053 COMPREHEN METABOLIC PANEL: CPT

## 2022-02-08 RX ORDER — FUROSEMIDE 40 MG/1
40 TABLET ORAL DAILY
Qty: 30 TABLET | Refills: 0 | Status: SHIPPED | OUTPATIENT
Start: 2022-02-08 | End: 2022-03-10

## 2022-02-08 RX ORDER — SULFAMETHOXAZOLE AND TRIMETHOPRIM 800; 160 MG/1; MG/1
1 TABLET ORAL
Qty: 12 TABLET | Refills: 5 | Status: SHIPPED | OUTPATIENT
Start: 2022-02-09 | End: 2022-02-24

## 2022-02-08 RX ORDER — PREDNISONE 10 MG/1
40 TABLET ORAL DAILY
Qty: 60 TABLET | Refills: 0 | Status: SHIPPED | OUTPATIENT
Start: 2022-02-08 | End: 2022-02-23

## 2022-02-08 RX ORDER — ALBUTEROL SULFATE 90 UG/1
2 AEROSOL, METERED RESPIRATORY (INHALATION)
Qty: 18 G | Refills: 0 | Status: SHIPPED | OUTPATIENT
Start: 2022-02-08

## 2022-02-08 RX ORDER — FAMOTIDINE 40 MG/1
40 TABLET, FILM COATED ORAL DAILY
Qty: 30 TABLET | Refills: 0 | Status: SHIPPED | OUTPATIENT
Start: 2022-02-08 | End: 2022-03-10

## 2022-02-08 RX ADMIN — PREDNISONE 20 MG: 20 TABLET ORAL at 08:26

## 2022-02-08 RX ADMIN — LOSARTAN POTASSIUM 100 MG: 50 TABLET, FILM COATED ORAL at 08:26

## 2022-02-08 RX ADMIN — FUROSEMIDE 40 MG: 40 TABLET ORAL at 09:00

## 2022-02-08 RX ADMIN — FLUTICASONE PROPIONATE 2 SPRAY: 50 SPRAY, METERED NASAL at 08:27

## 2022-02-08 RX ADMIN — GUAIFENESIN 600 MG: 600 TABLET, EXTENDED RELEASE ORAL at 08:27

## 2022-02-08 NOTE — PROGRESS NOTES
Physical Therapy  PT re-consulted. PT sw patient 2/4/2022 and signed off as patient at mobility baseline. RN and RT following O2 needs. Spoke with patient who reports no decline in mobility since prior PT evaluation. Patient continues with no additional PT needs. Spoke with  who reports RT scheduled to come perform 6MWT shortly and following test will be assisting with home O2 equipment. Will once again sign off.    Kathy Degroot, PT, DPT

## 2022-02-08 NOTE — PROGRESS NOTES
Occupational Therapy    Chart reviewed. Spoke with nursing, pt is awaiting 6 min walk test. However with no new OT needs. Nursing said to disregard consult. Therefore will sign off at this time. Available for re-consult for OT needs in future as indicated.     Thank you  Hugo Booker OTR/L

## 2022-02-08 NOTE — PROGRESS NOTES
Pulmonary, Critical Care, and Sleep Medicine    Progress Note    Name: Ayaan Rivera MRN: [id number]   : 1950 Hospital: . [address]   Date: 2022        IMPRESSION:   · At Risk for ILD from Rheumatoid and from Mtx related lung disease (on it for > 6mts). No overt infection. · Dyspnea on Exertion for several days. · Acute Hypoxic Respiratory Failure: Hypoxia, noted to have exertional hypoxia. Noted at PCP. Pox was 89% on RA at PCP office. Was noted to have pox of 92% on RA here. · Chronic Left Pleural effusion, multiple prior thoracentesis. · Rheumatoid Arthritis and on Mtx. · Immunocompromised. · Had Covid Vaccine and booster. · Procalcitonin level is low, does not suggest bacterial infection. · Noted to have hypertension, 168/114 on arrival.   · Covid PCR here was negative. · Anemia: hgb of 11.9  · Hyponatremia  · Increased CRP  · Allergy to Iodine. · Ex Smoker: quit 3-4 year ago. RECOMMENDATIONS:   · On steroids for possible Auto immune or Drug induced lung disease. · Hold Mtx for this week. · Will need to get PFTs as an outpt for baseline. She sees Dr. Aj Gallardo as outpt Pulmonary MD.   · Transition to PO steroids at 40mg daily until follow up, approx 2 wks; please also place on // bactrim proph at discharge   · Diurese as able  · Wean oxygen to keep pox over 90%. · Discussed with hospitalist. Stable from a pulmonary aspect for discharge   · We will be available again to see if needed        Doing well today    /  Feeling better  Only on 2lpm    2/6  Pt feels a little better. Still with cough, worse in am.   Still on moderate oxygen at 6L with Pox of 95%. No headaches. No chest pain, no back pain. No leg pain. Has been tolerating po intake well. Reviewed renal notes. They feel she is ready for discharge. PCP: Dr. Rafi Lan    Subjective:   Cc: Dyspnea on Exertion, Hypoxia.      HPI:   This patient has been seen and evaluated at the request of  Sonam for above. Patient is a 70 y.o. female who was admitted on 2/2/2022 for worsening shortness of breath. Noted to have hypoxia with exertion. Pt has hx of Rheumatoid Arthritis. ON Mtx. Had prior Covid Vaccines. Denies fevers. Has generalized weakness. Increasing shortness of breath. Worsening AL. Has a dry cough worse for the last 3 weeks. Had significant dyspnea with climbing a flight of stairs about 1 week prior. NO issues with fevers, chills, sweats. NO sinus issues. NO hemoptysis. NO GERD or aspiration. No joint pain or swelling. Otherwise had been feeling near baseline. Was noted to have crackles in all lung fields. Past Medical History:   Diagnosis Date    HLD (hyperlipidemia)     HTN (hypertension)     Rhe[facility] arthritis ([facility] [address]       Past Surgical History:   Procedure Laterality Date    HX APPENDECTOMY      HX BREAST BIOPSY Left     Surgical BX (Over 20yrs ago) - BENIGN     HX OTHER SURGICAL      Breast Biopsy      Prior to Admission medications    Medication Sig Start Date End Date Taking? Authorizing Provider   folic acid (FOLVITE) 1 mg tablet Take 1 mg by mouth four (4) days a week. (take on non-leucovorin days)   Yes Provider, Historical   ibuprofen (MOTRIN) 600 mg tablet Take 600 mg by mouth every six (6) hours as needed for Pain. Yes Provider, Historical   denosumab (Prolia) 60 mg/mL injection 60 mg by SubCUTAneous route. Yes Provider, Historical   golimumab (Simponi ARIA) 12.5 mg/mL soln solution 180 mg by IntraVENous route every two (2) months. Indications: rheumatoid arthritis   Yes Provider, Historical   telmisartan (MICARDIS) 80 mg tablet Take 80 mg by mouth daily. 8/2/21  Yes Provider, Historical   leucovorin calcium (WELLCOVORIN) 5 mg tablet Take 5 mg by mouth three (3) days a week. (daily x 3 days after methotrexate doses) 3/28/18  Yes Provider, Historical   methotrexate (RHEUMATREX) 2.5 mg tablet Take 17.5 mg by mouth every seven (7) days.  2/9/18  Yes Provider, Historical   predniSONE (DELTASONE) 10 mg tablet Take 10 mg by mouth daily as needed (rheumatoid arthritis). 18  Yes Provider, Historical     Allergies   Allergen Reactions    Iodine Rash      Social History     Tobacco Use    Smoking status: Former Smoker     Packs/day: 1.00     Years: 40.00     Pack years: 40.00     Types: Cigarettes     Quit date: 10/26/2016     Years since quittin.2    Smokeless tobacco: Never Used   Substance Use Topics    Alcohol use: Yes      History reviewed. No pertinent family history.      Current Facility-Administered Medications   Medication Dose Route Frequency    furosemide (LASIX) tablet 40 mg  40 mg Oral DAILY    predniSONE (DELTASONE) tablet 20 mg  20 mg Oral BID WITH MEALS    guaiFENesin ER (MUCINEX) tablet 600 mg  600 mg Oral Q12H    fluticasone propionate (FLONASE) 50 mcg/actuation nasal spray 2 Spray  2 Spray Both Nostrils DAILY    losartan (COZAAR) tablet 100 mg  100 mg Oral DAILY    sodium chloride (NS) flush 5-40 mL  5-40 mL IntraVENous Q8H    enoxaparin (LOVENOX) injection 40 mg  40 mg SubCUTAneous Q24H       Review of Systems:  Constitutional: positive for fatigue  Eyes: negative  Ears, nose, mouth, throat, and face: negative  Respiratory: positive for cough, wheezing or dyspnea on exertion  Cardiovascular: negative  Gastrointestinal: negative  Genitourinary:negative  Integument/breast: negative  Hematologic/lymphatic: negative  Musculoskeletal:negative  Neurological: negative  Behavioral/Psych: negative  Endocrine: negative  Allergic/Immunologic: negative    Objective:   Vital Signs:    Visit Vitals  /78 (BP 1 Location: Right upper arm, BP Patient Position: Sitting)   Pulse (!) 113   Temp 98.4 °F (36.9 °C)   Resp 20   Ht 5' 5\" (1.651 m)   Wt 91.5 kg (201 lb 11.2 oz)   SpO2 (!) 87%   BMI 33.56 kg/m²       O2 Device: Nasal cannula   O2 Flow Rate (L/min): 2 l/min   Temp (24hrs), Av.2 °F (36.8 °C), Min:98 °F (36.7 °C), Max:98.4 °F (36.9 °C) Intake/Output:   Last shift:      02/08 0701 - 02/08 1900  In: 240 [P.O.:240]  Out: -   Last 3 shifts: 02/06 1901 - 02/08 0700  In: 1000 [P.O.:480; I.V.:520]  Out: -     Intake/Output Summary (Last 24 hours) at 2/8/2022 1224  Last data filed at 2/8/2022 0913  Gross per 24 hour   Intake 480 ml   Output    Net 480 ml      Physical Exam:   General:  Alert, cooperative, no distress, appears stated age. On 6L oxygen with pox of 95%. Has a wet cough when seen this am.    Head:  Normocephalic, without obvious abnormality, atraumatic. Eyes:  Conjunctivae/corneas clear. PERRL, EOMs intact. Nose: Nares normal. Septum midline. Mucosa normal. No drainage or sinus tenderness. Throat: Lips, mucosa, and tongue normal. Teeth and gums normal.   Neck: Supple, symmetrical, trachea midline, no adenopathy, thyroid: no enlargment/tenderness/nodules, no carotid bruit and no JVD. Back:   Symmetric, no curvature. ROM normal.   Lungs:   Basilar crackles, seems worse on the right lung base more than left. Seems comfortable. Chest wall:  No tenderness or deformity. Heart:  Regular rate and rhythm, S1, S2 normal, no murmur, click, rub or gallop. Abdomen:   Soft, non-tender. Bowel sounds normal. No masses,  No organomegaly. Obese. Extremities: Extremities normal, atraumatic, no cyanosis or edema. Pulses: 2+ and symmetric all extremities. Skin: Skin color, texture, turgor normal. No rashes or lesions   Lymph nodes: Cervical, supraclavicular, and axillary nodes normal.   Neurologic: Grossly nonfocal, motor intact.       Data review:     Recent Results (from the past 24 hour(s))   CBC WITH AUTOMATED DIFF    Collection Time: 02/08/22  4:06 AM   Result Value Ref Range    WBC 9.5 3.6 - 11.0 K/uL    RBC 3.81 3.80 - 5.20 M/uL    HGB 12.5 11.5 - 16.0 g/dL    HCT 39.0 35.0 - 47.0 %    .4 (H) 80.0 - 99.0 FL    MCH 32.8 26.0 - 34.0 PG    MCHC 32.1 30.0 - 36.5 g/dL    RDW 12.0 11.5 - 14.5 %    PLATELET 416 (H) 215 - 400 K/uL MPV 8.7 (L) 8.9 - 12.9 FL    NRBC 0.0 0  WBC    ABSOLUTE NRBC 0.00 0.00 - 0.01 K/uL    NEUTROPHILS 69 32 - 75 %    LYMPHOCYTES 22 12 - 49 %    MONOCYTES 8 5 - 13 %    EOSINOPHILS 0 0 - 7 %    BASOPHILS 1 0 - 1 %    IMMATURE GRANULOCYTES 0 0.0 - 0.5 %    ABS. NEUTROPHILS 6.6 1.8 - 8.0 K/UL    ABS. LYMPHOCYTES 2.1 0.8 - 3.5 K/UL    ABS. MONOCYTES 0.7 0.0 - 1.0 K/UL    ABS. EOSINOPHILS 0.0 0.0 - 0.4 K/UL    ABS. BASOPHILS 0.1 0.0 - 0.1 K/UL    ABS. IMM. GRANS. 0.0 0.00 - 0.04 K/UL    DF AUTOMATED     METABOLIC PANEL, COMPREHENSIVE    Collection Time: 22  4:06 AM   Result Value Ref Range    Sodium 134 (L) 136 - 145 mmol/L    Potassium 4.0 3.5 - 5.1 mmol/L    Chloride 99 97 - 108 mmol/L    CO2 31 21 - 32 mmol/L    Anion gap 4 (L) 5 - 15 mmol/L    Glucose 109 (H) 65 - 100 mg/dL    BUN 16 6 - 20 MG/DL    Creatinine 0.59 0.55 - 1.02 MG/DL    BUN/Creatinine ratio 27 (H) 12 - 20      GFR est AA >60 >60 ml/min/1.73m2    GFR est non-AA >60 >60 ml/min/1.73m2    Calcium 9.1 8.5 - 10.1 MG/DL    Bilirubin, total 0.4 0.2 - 1.0 MG/DL    ALT (SGPT) 65 12 - 78 U/L    AST (SGOT) 35 15 - 37 U/L    Alk. phosphatase 59 45 - 117 U/L    Protein, total 6.5 6.4 - 8.2 g/dL    Albumin 2.2 (L) 3.5 - 5.0 g/dL    Globulin 4.3 (H) 2.0 - 4.0 g/dL    A-G Ratio 0.5 (L) 1.1 - 2.2     MAGNESIUM    Collection Time: 22  4:06 AM   Result Value Ref Range    Magnesium 2.3 1.6 - 2.4 mg/dL       Imagin--22:   Echo:  Result status: Final result       Left Ventricle: Left ventricle size is normal. Normal wall thickness. Normal wall motion. Normal left ventricular systolic function with a visually estimated EF of 55 - 60%.   Left Atrium: Left atrium is mildly dilated.              I have personally reviewed the patients radiographs and have reviewed the reports:  22: CTA of chest: FINDINGS:  THYROID: No nodule. MEDIASTINUM: Small reactive lymph nodes are seen throughout the mediastinum.   JOAQUINA: No mass or lymphadenopathy. THORACIC AORTA: No dissection or aneurysm. PULMONARY ARTERIES: Normal in caliber. The pulmonary arteries are well enhanced. No evidence of pulmonary embolus. TRACHEA/BRONCHI: Patent. ESOPHAGUS: No wall thickening or dilatation. HEART: Normal in size. PLEURA: Small left pleural effusion. LUNGS: There is diffuse patchy opacification throughout the lungs. INCIDENTALLY IMAGED UPPER ABDOMEN: No focal abnormality. BONES: No destructive bone lesion.      IMPRESSION  1. Negative for pulmonary embolus. .  2. Extensive airspace disease throughout the lungs. 3. Small left pleural effusion.         Vignesh Peace PA-C

## 2022-02-08 NOTE — PROGRESS NOTES
Transition of Care  1. RUR 7% Low  2. Disposition Home with family support  3. DME Home oxygen-Referral sent to [facility]  4. Transportation Family   5. Follow Up PCP, Specialist      CM continuing to follow patient for transitional care planning needs. Patient from home with  and is normally independent with self-care and ADLs. No reported DME used. Nephrology and Pulmonary, following. Patient currently on 2L oxygen. PT/OT eval on 2/4/21-no DC recommendations. CM to monitor. Medicare pt has received, reviewed, and signed 2nd IM letter informing them of their right to appeal the discharge. Signed copy has been placed on pt bedside chart. Manas Santos MS    2:50 Home oxygen order sent to [facility] via care port. Transition of Care Plan:     The Plan for Transition of Care is related to the following treatment goals: home oxygen    The Patient and/or patient representative was provided with a choice of provider and agrees  with the discharge plan. Yes [x] No []    A Freedom of choice list was provided with basic dialogue that supports the patient's individualized plan of care/goals and shares the quality data associated with the providers.        Yes [x] No []        Manas Santos, MS    4:00 pm     CM participated in [facility] Discharge for HF dx with bedside nursing and   attending, to include:    - Teach back method for subjects including:    *Confirmation of functioning home scale and plan of daily weights with   tracking after first morning void, preferably nude  *Low sodium diet of 1500 mg (1/3 teaspoon) or less/day  *Applicable fluid restrictions that pertain to patient  *Contacting MD if there is a 3 lb weight gain in 1 day or 5 lb weight gain   in 7 days  *Follow Up Appt plan for Cardio: ___  *Follow Up Appt plan for PCP: ___2/14/22 @ 9:00 am  *Confirmation of medication pick-up at: ___[facility] [address] [facility] information on AVS  *Review of updated med rec list and request to bring to all follow-up   appointments  *Discharge Transport via: ___   *Home Support via: ___Husband  *DME including: ___Home Oxygen  *Offering of empathy and listening to any concerns in attempts address discharge concerns prior to agreed upon safe discharge. All questions answered. SMAART-E checklist completed and placed in appropriate folder. *CM retrieved portable tank from [facility] DME closet and delivered tank to patient. Delivery ticket signed by CM and patient and patient provided pink copy. CM fax attached signed ticket to [facility] via [address] [place] [address] and will file ticket in DME closet. RN to complete education.      Latasha Resendiz MS

## 2022-02-08 NOTE — PROGRESS NOTES
Hospital follow-up PCP transitional care appointment has been scheduled with Dr. Hina Barclay for Monday, 2/14/22 at 9:00 a.m. This is the earliest appointment available. Pending patient discharge.   Linda Ramon, Care Management Specialist.

## 2022-02-08 NOTE — PROGRESS NOTES
NAME: Christine Brambila        :  1950        MRN:  [id number]         Assessment:  Hyponatremia- multifactorial etiology; excessive free water intake, poor solute intake and SIADH. Urine studies (high urine Na and U. Osm) + lowish uric acid. TSH is nl     HTN  SOB/Hypoxia  PNA- Covid testing is negative       Serum Na continues to show slow improvement-> up to 134 today     Plan/Recommendations:  Stable for discharge from my standpoint-> should get BMP with PCP in 1-2wks  Continue FR -> 1.5L/day  Ct PO Lasix 40 daily; home on this for now  Encourage increase solute intake   Avoid thiazides, SSRI, NSAIDs      Subjective:     Chief Complaint: \"I keep getting my hopes up on leaving\". Remains on O2. RN at bedside    Review of Systems:    Symptom Y/N Comments  Symptom Y/N Comments   Fever/Chills    Chest Pain     Poor Appetite    Edema     Cough    Abdominal Pain     Sputum    Joint Pain     SOB/AL    Pruritis/Rash     Nausea/vomit    Tolerating PT/OT     Diarrhea    Tolerating Diet     Constipation    Other       Could not obtain due to:      Objective:     VITALS:   Last 24hrs VS reviewed since prior progress note.  Most recent are:  Visit Vitals  BP (P) 123/77 (BP 1 Location: Right upper arm)   Pulse (!) (P) 111   Temp (P) 98.3 °F (36.8 °C)   Resp (P) 20   Ht 5' 5\" (1.651 m)   Wt 91.5 kg (201 lb 11.2 oz)   SpO2 (P) 91%   BMI 33.56 kg/m²       Intake/Output Summary (Last 24 hours) at 2022 0856  Last data filed at 2022 1226  Gross per 24 hour   Intake 480 ml   Output    Net 480 ml      Telemetry Reviewed:     PHYSICAL EXAM:  General: NAD  No LE edema      Lab Data Reviewed: (see below)    Medications Reviewed: (see below)    PMH/SH reviewed - no change compared to H&P  ________________________________________________________________________  Care Plan discussed with:  Patient Y    Family      RN Y    Care Manager Consultant:          Comments   >50% of visit spent in counseling and coordination of care       ________________________________________________________________________  Reyna Joseph MD     Procedures: see electronic medical records for all procedures/Xrays and details which  were not copied into this note but were reviewed prior to creation of Plan. LABS:  Recent Labs     02/08/22  0406 02/06/22  0548   WBC 9.5 10.1   HGB 12.5 11.7   HCT 39.0 34.4*   * 387     Recent Labs     02/08/22 0406 02/07/22  0542 02/06/22  0548   * 133* 130*   K 4.0 3.8 3.9   CL 99 100 95*   CO2 31 30 30   BUN 16 17 9   CREA 0.59 0.55 0.42*   * 86 101*   CA 9.1 9.0 8.8   MG  --   --  2.6*     Recent Labs     02/08/22  0406 02/06/22  0548   AP 59 58   TP 6.5 6.4   ALB 2.2* 2.1*   GLOB 4.3* 4.3*     No results for input(s): INR, PTP, APTT, INREXT, INREXT in the last 72 hours. No results for input(s): FE, TIBC, PSAT, FERR in the last 72 hours. No results found for: FOL, RBCF   No results for input(s): PH, PCO2, PO2 in the last 72 hours. No results for input(s): CPK, CKMB in the last 72 hours.     No lab exists for component: TROPONINI  No components found for: GLPOC  No results found for: COLOR, APPRN, SPGRU, REFSG, MANE, PROTU, GLUCU, KETU, BILU, UROU, NIMA, LEUKU, GLUKE, EPSU, BACTU, WBCU, RBCU, CASTS, UCRY    MEDICATIONS:  Current Facility-Administered Medications   Medication Dose Route Frequency    furosemide (LASIX) tablet 40 mg  40 mg Oral DAILY    diphenhydrAMINE (BENADRYL) capsule 25 mg  25 mg Oral Q6H PRN    predniSONE (DELTASONE) tablet 20 mg  20 mg Oral BID WITH MEALS    guaiFENesin ER (MUCINEX) tablet 600 mg  600 mg Oral Q12H    albuterol-ipratropium (DUO-NEB) 2.5 MG-0.5 MG/3 ML  3 mL Nebulization Q6H PRN    fluticasone propionate (FLONASE) 50 mcg/actuation nasal spray 2 Spray  2 Spray Both Nostrils DAILY    sodium chloride (OCEAN) 0.65 % nasal squeeze bottle 2 Spray  2 Spray Both Nostrils Q2H PRN    losartan (COZAAR) tablet 100 mg  100 mg Oral DAILY    sodium chloride (NS) flush 5-40 mL  5-40 mL IntraVENous Q8H    sodium chloride (NS) flush 5-40 mL  5-40 mL IntraVENous PRN    acetaminophen (TYLENOL) tablet 650 mg  650 mg Oral Q6H PRN    Or    acetaminophen (TYLENOL) suppository 650 mg  650 mg Rectal Q6H PRN    polyethylene glycol (MIRALAX) packet 17 g  17 g Oral DAILY PRN    ondansetron (ZOFRAN ODT) tablet 4 mg  4 mg Oral Q8H PRN    Or    ondansetron (ZOFRAN) injection 4 mg  4 mg IntraVENous Q6H PRN    enoxaparin (LOVENOX) injection 40 mg  40 mg SubCUTAneous Q24H

## 2022-02-08 NOTE — PROGRESS NOTES
[facility]ist [facility]                                                                                          Hospitalist Progress Note  Mary Hernandez MD  Answering service: [phone] OR [phone] from in house phone        Date of Service:  2022  NAME:  Parag Holloway  :  1950  MRN:  [id number]      Admission Summary:   Parag Holloway is a 70 y.o. female with pmh of HTN, Rheumatoid arthritis on MTX, and immunosuppresants presents with 3 days of worsening SOB. Patient was seen by her primary care physician today for complaints of shortness of breath, was noted to be hypoxic to the 80s on room air and sent to the emergency department for further evaluation, and concern for COVID-19. Patient has been vaccinated x3, however notably is immunocompromised with rheumatoid arthritis. Patient denies any fevers at home, she notes that she has been weak, increase shortness of breath. She is unable to barely walk up steps or even walk a few blocks without having to stop and catch her breath. She denied any lower extremity edema, para nocturnal dyspnea, orthopnea. But she does use 2 pillows to sleep at night, however is always done so. She does have a dry non productive cough. Denies any palpitations, chest pain, nausea vomiting or diarrhea. No musculoskeletal pain. Of note she does have a h/o pleural effusion which had to be drained multiple times (and cause was uncertain), she has since always had a \"tiny small one\" which never went away.     On arrival to the emergency department patient was found to be desaturating to the 80s on room air, placed on 2 L with improvement. Chest x-ray with bilateral infiltrates. COVID-19 PCR done and negative. Patient referred for admission to the hospitalist.    Chest CTA was done with evidence of pneumonia negative for PE       Interval history / Subjective:        . Seen sitting in chair ,reading. On oxygen 2 /min spo2 96%. Took her off oxygen spo2 remained 88-90 on RA. She denies sob,chest pain and eager to go home   Discussed with RN for 6 min walk test for home oxygen need assessment. LEt CM know that patient may d/c today and will likely oxygen. 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 She is Covid PCR negative, flu negative  Pulmonary following with recommendation to continue with steroid therapy  Continue with p.o. Lasix  6MW test and possible d/c today. She will need home oxygen,discussed with patient and she is in agreement . Hyponatremia, possibly SIADH-improving  - Monitor BMP    -Nephrology following closely with recommendations to restrict oral fluid intake   Avoid SSRI, NSAIDs, thiazides.  -Okay for d/c per renal,continue lasix 40 mg daily on discharge. F/u labs in 1-2 weeks      HTN   -continue losartan 100 mg daily    RA -   on MTX and leukovorin, q8week golimumab, as well as qweek denosumab . Hold for now            CODE STATUS: Full Code   DVT PROPHYLAXIS: Lovenox  FUNCTIONAL STATUS PRIOR TO HOSPITALIZATION: Fully active and ambulatory; able to carry on all self-care without restriction. EARLY MOBILITY ASSESSMENT: Recommend routine ambulation while hospitalized with the assistance of nursing staff  ANTICIPATED DISCHARGE: within 24 hours. EMERGENCY CONTACT/SURROGATE DECISION MAKER: Manisha          Hospital Problems  Date Reviewed: 4/17/2018          Codes Class Noted POA    Respiratory failure [facility]) ICD-10-CM: J96.90  ICD-9-CM: 518.81  2/2/2022 Unknown                Review of Systems:   A comprehensive review of systems was negative except for that written in the HPI. Vital Signs:    Last 24hrs VS reviewed since prior progress note. Most recent are:  Visit Vitals  /78 (BP 1 Location: Right upper arm, BP Patient Position: Sitting)   Pulse (!) 113   Temp 98.4 °F (36.9 °C)   Resp 20   Ht 5' 5\" (1.651 m)   Wt 91.5 kg (201 lb 11.2 oz)   SpO2 (!) 87%   BMI 33.56 kg/m²         Intake/Output Summary (Last 24 hours) at 2/8/2022 1214  Last data filed at 2/8/2022 0913  Gross per 24 hour   Intake 480 ml   Output    Net 480 ml        Physical Examination:     I had a face to face encounter with this patient and independently examined them on 2/8/2022 as outlined below:          Constitutional:  No acute distress, cooperative, pleasant    ENT:  Oral mucosa moist, oropharynx benign. Resp:  Received on 2 l/min. Crackles bilateral lower lobes   CV:  Regular rhythm, normal rate, no murmurs, gallops, rubs    GI:  Soft, non distended, non tender. normoactive bowel sounds, no hepatosplenomegaly     Musculoskeletal:  1+ pitting edema in the lower extremities, warm, 2+ pulses throughout    Neurologic:  Moves all extremities. AAOx3, CN II-XII reviewed            Data Review:    Review and/or order of clinical lab test      Labs:     Recent Labs     02/08/22  0406 02/06/22  0548   WBC 9.5 10.1   HGB 12.5 11.7   HCT 39.0 34.4*   * 387     Recent Labs     02/08/22  0406 02/07/22  0542 02/06/22  0548   * 133* 130*   K 4.0 3.8 3.9   CL 99 100 95*   CO2 31 30 30   BUN 16 17 9   CREA 0.59 0.55 0.42*   * 86 101*   CA 9.1 9.0 8.8   MG 2.3  --  2.6*     Recent Labs     02/08/22  0406 02/06/22  0548   ALT 65 46   AP 59 58   TBILI 0.4 0.4   TP 6.5 6.4   ALB 2.2* 2.1*   GLOB 4.3* 4.3*     No results for input(s): INR, PTP, APTT, INREXT, INREXT in the last 72 hours. No results for input(s): FE, TIBC, PSAT, FERR in the last 72 hours. No results found for: FOL, RBCF   No results for input(s): PH, PCO2, PO2 in the last 72 hours. No results for input(s): CPK, CKNDX, TROIQ in the last 72 hours.     No lab exists for component: CPKMB  No results found for: CHOL, CHOLX, CHLST, CHOLV, HDL, HDLP, LDL, LDLC, DLDLP, TGLX, TRIGL, TRIGP, CHHD, CHHDX  No results found for: GLUCPOC  No results found for: COLOR, APPRN, SPGRU, REFSG, MANE, PROTU, GLUCU, KETU, BILU, UROU, NIMA, LEUKU, GLUKE, EPSU, BACTU, WBCU, RBCU, CASTS, UCRY      Medications Reviewed:     Current Facility-Administered Medications   Medication Dose Route Frequency    furosemide (LASIX) tablet 40 mg  40 mg Oral DAILY    diphenhydrAMINE (BENADRYL) capsule 25 mg  25 mg Oral Q6H PRN    predniSONE (DELTASONE) tablet 20 mg  20 mg Oral BID WITH MEALS    guaiFENesin ER (MUCINEX) tablet 600 mg  600 mg Oral Q12H    albuterol-ipratropium (DUO-NEB) 2.5 MG-0.5 MG/3 ML  3 mL Nebulization Q6H PRN    fluticasone propionate (FLONASE) 50 mcg/actuation nasal spray 2 Spray  2 Spray Both Nostrils DAILY    sodium chloride (OCEAN) 0.65 % nasal squeeze bottle 2 Spray  2 Spray Both Nostrils Q2H PRN    losartan (COZAAR) tablet 100 mg  100 mg Oral DAILY    sodium chloride (NS) flush 5-40 mL  5-40 mL IntraVENous Q8H    sodium chloride (NS) flush 5-40 mL  5-40 mL IntraVENous PRN    acetaminophen (TYLENOL) tablet 650 mg  650 mg Oral Q6H PRN    Or    acetaminophen (TYLENOL) suppository 650 mg  650 mg Rectal Q6H PRN    polyethylene glycol (MIRALAX) packet 17 g  17 g Oral DAILY PRN    ondansetron (ZOFRAN ODT) tablet 4 mg  4 mg Oral Q8H PRN    Or    ondansetron (ZOFRAN) injection 4 mg  4 mg IntraVENous Q6H PRN    enoxaparin (LOVENOX) injection 40 mg  40 mg SubCUTAneous Q24H     ______________________________________________________________________  EXPECTED LENGTH OF STAY: 4d 2h  ACTUAL LENGTH OF STAY:          6                 Gerald Tello MD

## 2022-02-08 NOTE — DISCHARGE SUMMARY
Discharge Summary       PATIENT ID: Alejandra Juarez  MRN: [id number]   YOB: 1950    DATE OF ADMISSION: 2/2/2022 12:57 PM    DATE OF DISCHARGE: 02/08/22     PRIMARY CARE PROVIDER: Tamir Brown MD     ATTENDING PHYSICIAN: Ambreen Shaikh MD    DISCHARGING PROVIDER: Ambreen Shaikh MD    To contact this individual call [phone] and ask the  to page. If unavailable ask to be transferred the Adult Hospitalist Department. CONSULTATIONS: IP CONSULT TO NEPHROLOGY  IP CONSULT TO PULMONOLOGY    PROCEDURES/SURGERIES: * No surgery found *    DISCHARGE DIAGNOSES:   Admission secondary taken from the H&P:   Alejandra Juarez is a 70 y.o. female with pmh of HTN, Rheumatoid arthritis on MTX, and immunosuppresants presents with 3 days of worsening SOB. Patient was seen by her primary care physician today for complaints of shortness of breath, was noted to be hypoxic to the 80s on room air and sent to the emergency department for further evaluation, and concern for COVID-19. Patient has been vaccinated x3, however notably is immunocompromised with rheumatoid arthritis. Patient denies any fevers at home, she notes that she has been weak, increase shortness of breath. She is unable to barely walk up steps or even walk a few blocks without having to stop and catch her breath. She denied any lower extremity edema, para nocturnal dyspnea, orthopnea. But she does use 2 pillows to sleep at night, however is always done so. She does have a dry non productive cough. Denies any palpitations, chest pain, nausea vomiting or diarrhea. No musculoskeletal pain. Of note she does have a h/o pleural effusion which had to be drained multiple times (and cause was uncertain), she has since always had a \"tiny small one\" which never went away. On arrival to the emergency department patient was found to be desaturating to the 80s on room air, placed on 2 L with improvement.   Chest x-ray with bilateral infiltrates. COVID-19 PCR done and negative. She was admitted for further management and evaluation. CT angiogram of the chest was performed and was negative for pulmonary embolism however it showed diffuse patchy opacification throughout the lungs. She was admitted for acute on chronic respiratory failure, possible CAP, questionable diastolic CHF. She was treated with supplemental oxygen, antibiotics, steroids and diuresis. Echocardiogram was obtained subsequently which showed a normal LV function with EF of 55-60%. Right ventricle is of normal size and of normal systolic function and no significant valvular pathology. Pulmonary were consulted and followed patient, pulmonary felt patient most probably has autoimmune interstitial lung disease versus medication induced and recommended discharge on prednisone to be tapered off as outpatient. Patient is regularly followed by Dr. Yvan Escobar of the pulmonary associates whose team has followed patient in the hospital and they will let him know of her hospitalization and the events. Patient also knows to call his office to schedule an appointment to be seen before she finishes the prednisone. Pulmonary also recommended prophylactic Bactrim. She was evaluated for home oxygen and she desaturates during exertion and required 2 L of oxygen by nasal cannula to bring her up above 90% and was discharged on home oxygen. Pulmonary also recommended for her to hold off the methotrexate. She follows with rheumatologist Dr. Soo Whittington and I have advised her to let him off of her hospitalization events, pulmonary recommendation to hold methotrexate and the fact that question was raised if for pulmonary pathology is drug-induced. She has hyponatremia and was followed by nephrologist.  Sodium improved and was discharged on furosemide as recommended by nephrologist.  Patient will need labs within a week, I have asked her to schedule an appointment with her PCP.   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 Received ceftriaxone and azithromycin. Discharged on prophylactic antibiotics with Bactrim  She is Covid PCR negative, flu negative  Discharged home oxygen. Pulse oximetry assessment   92% at rest on room air (if 88% or less, skip next steps)  81% while ambulating on room air  94% at rest on 2LPM  92% while ambulating on 2LPM         Hyponatremia, possibly SIADH-improving  -Improved. -Okay for d/c per renal,continue lasix 40 mg daily on discharge. F/u labs in 1-2 weeks      HTN   -continue losartan 100 mg daily     RA -   on MTX and leukovorin, q8week golimumab, as well as qweek denosumab . Hold methotrexate for this week until talk to her rheumatologist as advised by pulmonary               DISCHARGE DIAGNOSES / PLAN:          BMI: Body mass index is 33.56 kg/m². . This patient: Meets criteria for obesity given BMI >/= 30 and < 40 due to excess calories/nutritional. Weight loss and lifestyle modifications should be encouraged as an outpatient. PENDING TEST RESULTS:   At the time of discharge the following test results are still pending: None     ADDITIONAL CARE RECOMMENDATIONS:   I discussed with patient and her . RN, case management Rach Bliss were present along with me. #1. Need for follow-up with PCP for blood work in 1-2 weeks to assess for electrolytes and kidney function especially in light of the fact that she is now on Bactrim and she is on myocardis, an ARB  #2. Drug drug interactions with Bactrim, methotrexate and advised her to discuss this with her rheumatologist.  Methotrexate is now on hold as advised by pulmonary  #3. She will need follow-up with her pulmonologist Dr. Kristina You for further follow-up, evaluation, PFT before she runs out of the prednisone and this needs to be continued and tapered off.     NOTIFY YOUR PHYSICIAN FOR ANY OF THE FOLLOWING:   Fever over 101 degrees for 24 hours. Chest pain, shortness of breath, fever, chills, nausea, vomiting, diarrhea, change in mentation, falling, weakness, bleeding. Severe pain or pain not relieved by medications, as well as any other signs or symptoms that you may have questions about. FOLLOW UP APPOINTMENTS:    Follow-up Information     Follow up With Specialties Details Why Contact Info    [place] MD Pulmonary Disease In 2 weeks  [address]  [address]  [facility]      David Gaming MD Internal Medicine On 2/14/2022 Hospital follow up PCP appointment Monday, 2/14/22 at 9:00 a.m. with Dr. Nya Miller [id number] [phone]               DIET: Regular Diet    ACTIVITY: Activity as tolerated    EQUIPMENT needed: Home oxygen    DISCHARGE MEDICATIONS:  Current Discharge Medication List      START taking these medications    Details   furosemide (LASIX) 40 mg tablet Take 1 Tablet by mouth daily for 30 days. Qty: 30 Tablet, Refills: 0  Start date: 2/8/2022, End date: 3/10/2022      albuterol (PROVENTIL HFA, VENTOLIN HFA, PROAIR HFA) 90 mcg/actuation inhaler Take 2 Puffs by inhalation every six (6) hours as needed for Wheezing. Qty: 18 g, Refills: 0  Start date: 2/8/2022      trimethoprim-sulfamethoxazole (Bactrim DS) 160-800 mg per tablet Take 1 Tablet by mouth BID Mon Wed & Fri for 15 days. Qty: 12 Tablet, Refills: 5  Start date: 2/9/2022, End date: 2/24/2022      famotidine (Pepcid) 40 mg tablet Take 1 Tablet by mouth daily for 30 days. Qty: 30 Tablet, Refills: 0  Start date: 2/8/2022, End date: 3/10/2022         CONTINUE these medications which have CHANGED    Details   predniSONE (DELTASONE) 10 mg tablet Take 40 mg by mouth daily for 15 days.   Qty: 60 Tablet, Refills: 0  Start date: 2/8/2022, End date: 2/23/2022         CONTINUE these medications which have NOT CHANGED    Details folic acid (FOLVITE) 1 mg tablet Take 1 mg by mouth four (4) days a week. (take on non-leucovorin days)      denosumab (Prolia) 60 mg/mL injection 60 mg by SubCUTAneous route.      golimumab (Simponi ARIA) 12.5 mg/mL soln solution 180 mg by IntraVENous route every two (2) months. Indications: rheumatoid arthritis      telmisartan (MICARDIS) 80 mg tablet Take 80 mg by mouth daily. leucovorin calcium (WELLCOVORIN) 5 mg tablet Take 5 mg by mouth three (3) days a week. (daily x 3 days after methotrexate doses)  Refills: 6      methotrexate (RHEUMATREX) 2.5 mg tablet Take 17.5 mg by mouth every seven (7) days. Refills: 3         STOP taking these medications       ibuprofen (MOTRIN) 600 mg tablet Comments:   Reason for Stopping:               DISPOSITION: Home on home oxygen  x  Home With:   OT  PT  HH  RN       Long term SNF/Inpatient Rehab    Independent/assisted living    Hospice    Other:       PATIENT CONDITION AT DISCHARGE:     Functional status    Poor     Deconditioned    x Independent      Cognition     Lucid     Forgetful     Dementia      Catheters/lines (plus indication)    Dodd     PICC     PEG    x None      Code status    x Full code     DNR      PHYSICAL EXAMINATION AT DISCHARGE:                                 Constitutional:  No acute distress, cooperative, pleasant    ENT:  Oral mucosa moist, oropharynx benign. Resp:  Received on 2 l/min. Crackles bilateral lower lobes   CV:  Regular rhythm, normal rate, no murmurs, gallops, rubs    GI:  Soft, non distended, non tender. normoactive bowel sounds, no hepatosplenomegaly     Musculoskeletal:  1+ pitting edema in the lower extremities, warm, 2+ pulses throughout    Neurologic:  Moves all extremities.   AAOx3, CN II-XII reviewed                                    CHRONIC MEDICAL DIAGNOSES:  Problem List as of 2/8/2022 Date Reviewed: 4/17/2018          Codes Class Noted - Resolved    Res[facility] ICD-10-CM: J96.90  ICD-9-CM: 005.77 2/2/2022 - Present              Greater than 30 minutes were spent with the patient on counseling and coordination of care    Signed:    Ulises Morris MD  2/8/2022  2:50 PM

## 2022-02-08 NOTE — DISCHARGE INSTRUCTIONS
Discharge Instructions       PATIENT ID: Lori iYp  MRN: [id number]   YOB: 1950    DATE OF ADMISSION: 2/2/2022 12:57 PM    DATE OF DISCHARGE: 2/8/2022    PRIMARY CARE PROVIDER: Emely Barillas MD     ATTENDING PHYSICIAN: Toni Radford MD  DISCHARGING PROVIDER: Ramiro Montes MD    To contact this individual call [phone] and ask the  to page. If unavailable ask to be transferred the Adult Hospitalist Department. DISCHARGE DIAGNOSES and CARE RECOMMENDATIONS:             Acute hypoxic respiratory failure ,suspected autoimmune interstitial lung disease versus medication induced,and possibly diastolic congestive heart failure. You were followed by pulmonary doctors who feel you probably have interstitial lung disease and recommended steroids on discharge and follow up with your pulmonary in 2 weeks time,before your finish the prescribed steroid. You will need PFTs(pulmonary function tests). They also recommended to hold taking the methotrexate for this week. Please discuss with your Rheumatologist.The antibiotics Bactrim is recommended for infection prevention as you will be on high dose ,prolonged steroid. Let your rheumatologist and primary doctor know so they can monitor the methotrexate level and kidney numbers while you are on the bactrim. Community-acquired pneumonia ,you have completed treatment  You are prescribed oxygen as your oxygen level dropped during activity. Low sodium(Hyponatremia). Your sodium has improved,134. You were followed by kidney doctors. Continue the diuretic Lasix,have blood work in 1-2 weeks.             DIET: Regular Diet      ACTIVITY: Activity as tolerated        PENDING TEST RESULTS:   At the time of discharge the following test results are still pending: none    FOLLOW UP APPOINTMENTS:   Follow-up Information     Follow up With Specialties Details Why Contact Info    Aamir Mendenhall MD Pulmonary Disease In 2 weeks  [address] [place] [address]  [phone]               YOU WERE SEEN BY THE FOLLOWING CONSULTATIONS:   IP CONSULT TO NEPHROLOGY  IP CONSULT TO PULMONOLOGY    YOU HAD THE FOLLOWING PROCEDURES/SURGERIES  :   * No surgery found *              DISCHARGE MEDICATIONS:   See Medication Reconciliation Form    · It is important that you take the medication exactly as they are prescribed. · Keep your medication in the bottles provided by the pharmacist and keep a list of the medication names, dosages, and times to be taken in your wallet. · Do not take other medications without consulting your doctor. NOTIFY YOUR PHYSICIAN FOR ANY OF THE FOLLOWING:   Fever over 101 degrees for 24 hours. Chest pain, shortness of breath, fever, chills, nausea, vomiting, diarrhea, change in mentation, falling, weakness, bleeding. Severe pain or pain not relieved by medications. Or, any other signs or symptoms that you may have questions about. Signed:    Alok Beaulieu MD  2/8/2022  12:29 PM

## 2022-02-08 NOTE — PROGRESS NOTES
Pulse oximetry assessment   92% at rest on room air (if 88% or less, skip next steps)  81% while ambulating on room air  94% at rest on 2LPM  92% while ambulating on 2LPM

## 2022-02-09 ENCOUNTER — PATIENT OUTREACH (OUTPATIENT)
Dept: CASE MANAGEMENT | Age: 72
End: 2022-02-09

## 2022-02-09 NOTE — PROGRESS NOTES
Care Transitions Initial Call    Call within 2 business days of discharge: Yes     Patient: Voncile Schirmer Patient : 1950 MRN: [id number]    Last Discharge [address]       Complaint Diagnosis Description Type Department Provider    22 Shortness of Breath Pneumonia of both lungs due to infectious organism, unspecified part of lung . .. ED to Hosp-Admission (Discharged) (ADMIT) Linette Simmons MD; Chloe Soto. ..          2022  Initial outreach attempt unsuccessful. LMTCB. 2/10/2022  Contacted the patient    Was this an external facility discharge? No     Challenges to be reviewed by the provider   Additional needs identified to be addressed with provider:   Hold methotrexate x one week  Pulm appt with Dr. Noonan Im  930am       Method of communication with provider : none    Discussed COVID-19 related testing which was available at this time. Test results were negative. Patient informed of results, if available? yes     Advance Care Planning:   Does patient have an Advance Directive:  Not on file    Inpatient Readmission Risk score: Unplanned Readmit Risk Score: 8.2 ( )    Was this a readmission? yes   Patient stated reason for the admission: sob    Patients top risk factors for readmission: medical condition-sob   Interventions to address risk factors: Scheduled appointment with PCP-, Scheduled appointment with Specialist- and Obtained and reviewed discharge summary and/or continuity of care documents    Care Transition Nurse (CTN) contacted the patient by telephone to perform post hospital discharge assessment. Verified name and  with patient as identifiers. Provided introduction to self, and explanation of the CTN role. CTN reviewed discharge instructions, medical action plan and red flags with patient who verbalized understanding. Were discharge instructions available to patient? yes.  Reviewed appropriate site of care based on symptoms and resources available to patient including: PCP and Specialist. Patient given an opportunity to ask questions and does not have any further questions or concerns at this time. Medication reconciliation was performed with patient, who verbalizes understanding of administration of home medications. Referral to Pharm D needed: no     Home Health/Outpatient orders at discharge: none    Durable Medical Equipment ordered at discharge: [address]: [place] [facility]  Durable Medical Equipment received: yes    Covid Risk Education    Educated patient about risk for severe COVID-19 due to risk factors according to CDC guidelines. CTN reviewed discharge instructions, medical action plan and red flag symptoms with the patient who verbalized understanding. Discussed COVID vaccination status: yes. Education provided on COVID-19 vaccination as appropriate. Discussed exposure protocols and quarantine with CDC Guidelines. Patient was given an opportunity to verbalize any questions and concerns and agrees to contact CTN or health care provider for questions related to their healthcare. Was patient discharged with a pulse oximeter? no.     Discussed follow-up appointments. If no appointment was previously scheduled, appointment scheduling offered: no. Is follow up appointment scheduled within 7 days of discharge? yes. [facility] follow up appointment(s): No future appointments. Non-[facility] follow up appointment(s): 2/14 Dr Demetri Ardon ; 2/17 Dr. Baez Room for follow-up call in 10-14 days based on severity of symptoms and risk factors.

## 2022-02-21 ENCOUNTER — TRANSCRIBE ORDER (OUTPATIENT)
Dept: SCHEDULING | Age: 72
End: 2022-02-21

## 2022-02-21 DIAGNOSIS — J84.9 INTERSTITIAL LUNG DISEASE (HCC): Primary | ICD-10-CM

## 2022-03-17 ENCOUNTER — TRANSCRIBE ORDER (OUTPATIENT)
Dept: SCHEDULING | Age: 72
End: 2022-03-17

## 2022-03-17 DIAGNOSIS — J84.9 INTERSTITIAL LUNG DISEASE (HCC): Primary | ICD-10-CM

## 2022-03-19 PROBLEM — J96.90 RESPIRATORY FAILURE (HCC): Status: ACTIVE | Noted: 2022-02-02

## 2022-04-09 ENCOUNTER — HOSPITAL ENCOUNTER (OUTPATIENT)
Dept: CT IMAGING | Age: 72
Discharge: HOME | End: 2022-04-09
Attending: INTERNAL MEDICINE
Payer: MEDICARE

## 2022-04-09 DIAGNOSIS — J84.9 INTERSTITIAL LUNG DISEASE (HCC): ICD-10-CM

## 2022-04-09 PROCEDURE — 71250 CT THORAX DX C-: CPT

## 2022-11-30 ENCOUNTER — TRANSCRIBE ORDER (OUTPATIENT)
Dept: SCHEDULING | Age: 72
End: 2022-11-30

## 2022-11-30 DIAGNOSIS — J84.9 INTERSTITIAL LUNG DISEASE (HCC): Primary | ICD-10-CM

## 2023-01-18 RX ORDER — LEFLUNOMIDE 20 MG/1
20 TABLET ORAL DAILY
COMMUNITY

## 2023-01-18 RX ORDER — LOPERAMIDE HCL 2 MG
4 TABLET ORAL AS NEEDED
COMMUNITY

## 2023-01-25 ENCOUNTER — ANESTHESIA (OUTPATIENT)
Dept: ENDOSCOPY | Age: 73
End: 2023-01-25
Payer: MEDICARE

## 2023-01-25 ENCOUNTER — ANESTHESIA EVENT (OUTPATIENT)
Dept: ENDOSCOPY | Age: 73
End: 2023-01-25
Payer: MEDICARE

## 2023-01-25 ENCOUNTER — HOSPITAL ENCOUNTER (OUTPATIENT)
Age: 73
Setting detail: OUTPATIENT SURGERY
Discharge: HOME | End: 2023-01-25
Attending: INTERNAL MEDICINE | Admitting: INTERNAL MEDICINE
Payer: MEDICARE

## 2023-01-25 VITALS
RESPIRATION RATE: 16 BRPM | TEMPERATURE: 96.2 F | BODY MASS INDEX: 33.15 KG/M2 | SYSTOLIC BLOOD PRESSURE: 105 MMHG | HEIGHT: 65 IN | WEIGHT: 199 LBS | DIASTOLIC BLOOD PRESSURE: 74 MMHG | HEART RATE: 93 BPM | OXYGEN SATURATION: 98 %

## 2023-01-25 PROCEDURE — 74011000250 HC RX REV CODE- 250: Performed by: NURSE ANESTHETIST, CERTIFIED REGISTERED

## 2023-01-25 PROCEDURE — 74011250636 HC RX REV CODE- 250/636: Performed by: NURSE ANESTHETIST, CERTIFIED REGISTERED

## 2023-01-25 PROCEDURE — 88305 TISSUE EXAM BY PATHOLOGIST: CPT

## 2023-01-25 PROCEDURE — 76060000032 HC ANESTHESIA 0.5 TO 1 HR: Performed by: INTERNAL MEDICINE

## 2023-01-25 PROCEDURE — 77030021593 HC FCPS BIOP ENDOSC BSC -A: Performed by: INTERNAL MEDICINE

## 2023-01-25 PROCEDURE — 76040000007: Performed by: INTERNAL MEDICINE

## 2023-01-25 RX ORDER — FLUMAZENIL 0.1 MG/ML
0.2 INJECTION INTRAVENOUS
Status: DISCONTINUED | OUTPATIENT
Start: 2023-01-25 | End: 2023-01-25 | Stop reason: HOSPADM

## 2023-01-25 RX ORDER — SODIUM CHLORIDE 9 MG/ML
50 INJECTION, SOLUTION INTRAVENOUS CONTINUOUS
Status: DISCONTINUED | OUTPATIENT
Start: 2023-01-25 | End: 2023-01-25 | Stop reason: HOSPADM

## 2023-01-25 RX ORDER — LIDOCAINE HYDROCHLORIDE 20 MG/ML
INJECTION, SOLUTION EPIDURAL; INFILTRATION; INTRACAUDAL; PERINEURAL AS NEEDED
Status: DISCONTINUED | OUTPATIENT
Start: 2023-01-25 | End: 2023-01-25 | Stop reason: HOSPADM

## 2023-01-25 RX ORDER — NALOXONE HYDROCHLORIDE 0.4 MG/ML
0.4 INJECTION, SOLUTION INTRAMUSCULAR; INTRAVENOUS; SUBCUTANEOUS
Status: DISCONTINUED | OUTPATIENT
Start: 2023-01-25 | End: 2023-01-25 | Stop reason: HOSPADM

## 2023-01-25 RX ORDER — EPINEPHRINE 0.1 MG/ML
1 INJECTION INTRACARDIAC; INTRAVENOUS
Status: DISCONTINUED | OUTPATIENT
Start: 2023-01-25 | End: 2023-01-25 | Stop reason: HOSPADM

## 2023-01-25 RX ORDER — SODIUM CHLORIDE 0.9 % (FLUSH) 0.9 %
5-40 SYRINGE (ML) INJECTION EVERY 8 HOURS
Status: DISCONTINUED | OUTPATIENT
Start: 2023-01-25 | End: 2023-01-25 | Stop reason: HOSPADM

## 2023-01-25 RX ORDER — ATROPINE SULFATE 0.1 MG/ML
0.5 INJECTION INTRAVENOUS
Status: DISCONTINUED | OUTPATIENT
Start: 2023-01-25 | End: 2023-01-25 | Stop reason: HOSPADM

## 2023-01-25 RX ORDER — PROPOFOL 10 MG/ML
INJECTION, EMULSION INTRAVENOUS AS NEEDED
Status: DISCONTINUED | OUTPATIENT
Start: 2023-01-25 | End: 2023-01-25 | Stop reason: HOSPADM

## 2023-01-25 RX ORDER — SODIUM CHLORIDE 9 MG/ML
INJECTION, SOLUTION INTRAVENOUS
Status: DISCONTINUED | OUTPATIENT
Start: 2023-01-25 | End: 2023-01-25 | Stop reason: HOSPADM

## 2023-01-25 RX ORDER — DEXTROMETHORPHAN/PSEUDOEPHED 2.5-7.5/.8
1.2 DROPS ORAL
Status: DISCONTINUED | OUTPATIENT
Start: 2023-01-25 | End: 2023-01-25 | Stop reason: HOSPADM

## 2023-01-25 RX ORDER — SODIUM CHLORIDE 0.9 % (FLUSH) 0.9 %
5-40 SYRINGE (ML) INJECTION AS NEEDED
Status: DISCONTINUED | OUTPATIENT
Start: 2023-01-25 | End: 2023-01-25 | Stop reason: HOSPADM

## 2023-01-25 RX ADMIN — PROPOFOL 25 MG: 10 INJECTION, EMULSION INTRAVENOUS at 09:45

## 2023-01-25 RX ADMIN — PROPOFOL 25 MG: 10 INJECTION, EMULSION INTRAVENOUS at 09:30

## 2023-01-25 RX ADMIN — PROPOFOL 25 MG: 10 INJECTION, EMULSION INTRAVENOUS at 09:38

## 2023-01-25 RX ADMIN — SODIUM CHLORIDE: 900 INJECTION, SOLUTION INTRAVENOUS at 09:13

## 2023-01-25 RX ADMIN — PROPOFOL 25 MG: 10 INJECTION, EMULSION INTRAVENOUS at 09:51

## 2023-01-25 RX ADMIN — PROPOFOL 25 MG: 10 INJECTION, EMULSION INTRAVENOUS at 08:58

## 2023-01-25 RX ADMIN — PROPOFOL 25 MG: 10 INJECTION, EMULSION INTRAVENOUS at 09:34

## 2023-01-25 RX ADMIN — PROPOFOL 25 MG: 10 INJECTION, EMULSION INTRAVENOUS at 09:31

## 2023-01-25 RX ADMIN — PROPOFOL 50 MG: 10 INJECTION, EMULSION INTRAVENOUS at 09:28

## 2023-01-25 RX ADMIN — PROPOFOL 25 MG: 10 INJECTION, EMULSION INTRAVENOUS at 09:36

## 2023-01-25 RX ADMIN — PROPOFOL 25 MG: 10 INJECTION, EMULSION INTRAVENOUS at 09:32

## 2023-01-25 RX ADMIN — PROPOFOL 25 MG: 10 INJECTION, EMULSION INTRAVENOUS at 09:40

## 2023-01-25 RX ADMIN — PROPOFOL 25 MG: 10 INJECTION, EMULSION INTRAVENOUS at 09:50

## 2023-01-25 RX ADMIN — LIDOCAINE HYDROCHLORIDE 40 MG: 20 INJECTION, SOLUTION EPIDURAL; INFILTRATION; INTRACAUDAL; PERINEURAL at 09:28

## 2023-01-25 RX ADMIN — PROPOFOL 25 MG: 10 INJECTION, EMULSION INTRAVENOUS at 09:29

## 2023-01-25 RX ADMIN — PROPOFOL 50 MG: 10 INJECTION, EMULSION INTRAVENOUS at 09:39

## 2023-01-25 RX ADMIN — SODIUM CHLORIDE: 900 INJECTION, SOLUTION INTRAVENOUS at 09:44

## 2023-01-25 NOTE — DISCHARGE INSTRUCTIONS
[address] [facility] Kyle Adame MD  [phone]      January 25, 2023    [address] [place] [address] Street: 1950    COLONOSCOPY DISCHARGE INSTRUCTIONS    If there is redness at IV site you should apply warm compress to area. If redness or soreness persist contact Dr. Kelvin Adame or your primary care doctor. There may be a slight amount of blood passed from the rectum. Gaseous discomfort may develop, but walking, belching will help relieve this. You may not operate a vehicle for 12 hours  You may not operate machinery or dangerous appliances for rest of today  You may not drink alcoholic beverages for 12 hours  Avoid making any critical decisions for 24 hours    DIET:  You may resume your normal diet, but some patients find that heavy or large meals may lead to indigestion or vomiting. I suggest a light meal as first food intake. MEDICATIONS:  The use of some over-the-counter pain medication may lead to bleeding after colon biopsies or polyp removal.  Tylenol (also called acetaminophen) is safe to take even if you have had colonoscopy with polyp removal.  Based on the procedure you can resume baby-dose aspirin (81 mg) and may safely take anticoagulation (like apixaban (Eliquis), dabigatran (Pradaxa), edoxaban (Betzaida Parlor), rivaroxaban (Xarelto) or warfarin (Coumadin)) or antiplatelet medications (high dose aspirin 325mg, Clopidogrel (Plavix), Prasugrel (Effient), Ticagrelor (Brilinta))for the next two (48 hours) days. ACTIVITY:  You may resume your normal household activities, but it is recommended that you spend the remainder of the day resting -  avoid any strenuous activity. CALL DR. PALENCIA'S OFFICE IF:  Increasing pain, nausea, vomiting  Abdominal distension (swelling)  Significant new or increased bleeding (oral or rectal)  Fever/Chills  Chest pain/shortness of breath                       Additional instructions:   No large polyps or cancer, no obvious inflammation, but some retained stool on the right side of colon that prevented a complete examination to identify small polyps. Recommend repeat colonoscopy in 1 year due to retained stool. Will contact you with your pathology report and any other further recommendations in 7-10 business days. Otherwise, nothing was visualized on this exam to explain your symptoms; no signs of inflammation or ulcers. It was an honor to be your doctor today. Please let me or my office staff know if you have any feedback about today's procedure. Jesica Parker MD, January 25, 2023    Colonoscopy saves lives, and can prevent colon cancer. Everyone aged 48 or older needs colonoscopy.   Tell your family and friends: get the test!

## 2023-01-25 NOTE — ROUTINE PROCESS
Devante Orf  1950  [id number]    Situation:  Verbal report received from: Fe RN  Procedure: Procedure(s):  COLONOSCOPY  COLON BIOPSY    Background:    Preoperative diagnosis: PERSONAL HISTORY POLYP  Postoperative diagnosis: normal colon exam    :  Dr. Mayra Brown  Assistant(s): Endoscopy RN-1: Sowmya Zamudio RN  Endoscopy RN-2: Janina Valdez RN    Specimens:   ID Type Source Tests Collected by Time Destination   1 : random colon bx Preservative Colon  Rosales Cota MD 1/25/2023 1723 Pathology     H. Pylori  no    Assessment:  Anesthesia gave intra-procedure sedation and medications, see anesthesia flow sheet yes    Intravenous fluids: NS@ KVO     Vital signs stable     Abdominal assessment: round and soft     Recommendation:  Discharge patient per MD order.   Family or Friend Sister-in-law in [phone] to share finding with family or friend yes

## 2023-01-25 NOTE — PERIOP NOTES
.Patient has been evaluated by anesthesia pre-procedure. Patient alert and oriented. Vital signs will not be charted by the Endoscopy nurse. All vitals, airway, and loc are monitored by anesthesia staff throughout procedure.       .Endoscope was pre-cleaned at bedside immediately following procedure by GLADYS Chaves verified

## 2023-01-25 NOTE — ANESTHESIA PREPROCEDURE EVALUATION
Relevant Problems   No relevant active problems       Anesthetic History   No history of anesthetic complications            Review of Systems / Medical History  Patient summary reviewed, nursing notes reviewed and pertinent labs reviewed    Pulmonary    COPD               Neuro/Psych   Within defined limits           Cardiovascular    Hypertension: well controlled              Exercise tolerance: >4 METS  Comments: normal LV function with EF of 55-60%   GI/Hepatic/Renal                Endo/Other        Arthritis     Other Findings            Physical Exam    Airway  Mallampati: I  TM Distance: > 6 cm  Neck ROM: normal range of motion   Mouth opening: Normal     Cardiovascular    Rhythm: regular           Dental  No notable dental hx       Pulmonary  Breath sounds clear to auscultation               Abdominal         Other Findings            Anesthetic Plan    ASA: 2  Anesthesia type: MAC          Induction: Intravenous  Anesthetic plan and risks discussed with: Patient

## 2023-01-25 NOTE — H&P
Pre-Endoscopy H&P   Chief complaint: screening or surveillance of previous colon polyps    HPI:  Patient presents for procedure. The indication for the procedure, the patient's history and the patient's current medications are reviewed prior to the procedure and that data is reported on the endoscopy note in the chart to which this document is attached. Any significant complaints with regard to organ systems will be noted, and if not mentioned then a review of systems is not contributory. Past Medical History:   Diagnosis Date    HLD (hyperlipidemia)     HTN (hypertension)     Rheumatoid arthritis ([facility] [address]       Past Surgical History:   Procedure Laterality Date    HX APPENDECTOMY      HX BREAST BIOPSY Left     Surgical BX (Over 20yrs ago) - BENIGN     HX OTHER SURGICAL      Breast Biopsy     Social   Social History     Tobacco Use    Smoking status: Former     Packs/day: 1.00     Years: 40.00     Pack years: 40.00     Types: Cigarettes     Quit date: 10/26/2016     Years since quittin.2    Smokeless tobacco: Never   Substance Use Topics    Alcohol use: Yes      History reviewed. No pertinent family history. Allergies   Allergen Reactions    Iodine Rash      Prior to Admission Medications   Prescriptions Last Dose Informant Patient Reported? Taking?   denosumab (PROLIA SC) 10/25/2022  Yes Yes   Sig: by SubCUTAneous route. Every 6 months. leflunomide (ARAVA) 20 mg tablet 2023  Yes Yes   Sig: Take 20 mg by mouth daily. leucovorin calcium (WELLCOVORIN) 5 mg tablet 2023  Yes Yes   Sig: Take 5 mg by mouth daily. loperamide (IMMODIUM) 2 mg tablet 2023  Yes Yes   Sig: Take 4 mg by mouth as needed for Diarrhea.   telmisartan (MICARDIS) 80 mg tablet 2023  Yes Yes   Sig: Take 80 mg by mouth daily. tocilizumab (ACTEMRA IV) 2023  Yes Yes   Sig: by IntraVENous route every thirty (30) days.       Facility-Administered Medications: None       PHYSICAL EXAM:  The patient is examined immediately prior to the procedure. Visit Vitals  BP (!) 127/90   Pulse 99   Resp 18   Ht 5' 5\" (1.651 m)   Wt 90.3 kg (199 lb)   SpO2 98%   BMI 33.12 kg/m²     Gen: Appears comfortable, no distress. Pulm: comfortable respirations with no abnormal audible breath sounds  CV: heart regular, well perfused  GI: abdomen flat. ASSESSMENT:  Patient here for procedure. The indication for the procedure, the patient's history and the patient's current medications are reviewed prior to the procedure and that data is reported on the endoscopy note in the chart to which this document is attached. PLAN:  Informed consent discussion held, patient afforded an opportunity to ask questions and all questions answered. After being advised of the risks, benefits, and alternatives, the patient requested that we proceed and indicated so on a written consent form. Will proceed with procedure as planned.   Jayy Carl MD

## 2023-01-25 NOTE — PROCEDURES
[facility].  [address] [facility] [address], [address]  [phone]                              Colonoscopy Procedure Note      Indications:    Diarrhea     :  Cande Haas MD    Staff: Endoscopy RN-1: Lonny Peraza RN  Endoscopy RN-2: Dakota Samuels RN    Referring Provider: Santa Abbott MD    Sedation: MAC    Procedure Details:  After informed consent was obtained with all risks and benefits of procedure explained and preoperative exam completed, the patient was taken to the endoscopy suite and placed in the left lateral decubitus position. Upon sequential sedation as per above, a digital rectal exam was performed per below. The Olympus videocolonoscope was inserted in the rectum and carefully advanced to the cecum, which was identified by the ileocecal valve and appendiceal orifice. The quality of preparation was inadequate. [place] Bowel Prep Score : 3/3/1/7 . The colonoscope was slowly withdrawn with careful evaluation between folds. Retroflexion in the rectum was performed. Findings: No polyps, no masses, no abnormal mucosa, however thick, opaque patchy stool retained in the right colon that limited examination for small polyps. Random biopsies obtained to exclude colopathy. Rectum: normal   Sigmoid: normal   Descending Colon: normal  Transverse Colon: normal  Ascending Colon: moderate amount of opaque stool adherent to mucosa unable to lavage, therefore unable to perform complete exam in this segment, though able to exclude large lesions >10mm  Cecum: moderate amount of opaque stool adherent to mucosa unable to lavage, therefore unable to perform complete exam in this segment, though able to exclude large lesions >10mm   Terminal Ileum: unable to intubate    Specimen Removed:    ID Type Source Tests Collected by Time Destination   1 : random colon bx Preservative Colon  Megan Elizondo MD 1/25/2023 9681 Pathology       Complications: None.      EBL: none    Impression:    See Postoperative diagnosis above    Recommendations:   - Await pathology evaluation of biopsy / resected tissue  - Resume normal medications. - Resume previous diet. - Recommend repeat colonoscopy in 1 year due to retained stool    Discharge Disposition:  Home in the company of a  when able to ambulate.     Neftaly Cobb MD  1/25/2023  9:56 AM

## 2023-01-25 NOTE — ANESTHESIA POSTPROCEDURE EVALUATION
Post-Anesthesia Evaluation and Assessment    Patient: Olesya Marie MRN: [id number]  SSN: [id number]    YOB: 1950  Age: 67 y.o. Sex: female      I have evaluated the patient and they are stable and ready for discharge from the PACU. Cardiovascular Function/Vital Signs  Visit Vitals  /74   Pulse 93   Temp (!) 35.7 °C (96.2 °F)   Resp 16   Ht 5' 5\" (1.651 m)   Wt 90.3 kg (199 lb)   SpO2 98%   BMI 33.12 kg/m²       Patient is status post MAC anesthesia for Procedure(s):  COLONOSCOPY  COLON BIOPSY. Nausea/Vomiting: None    Postoperative hydration reviewed and adequate. Pain:  Pain Scale 1: Numeric (0 - 10) (01/25/23 1003)  Pain Intensity 1: 0 (01/25/23 1003)   Managed    Neurological Status: At baseline    Mental Status, Level of Consciousness: Alert and  oriented to person, place, and time    Pulmonary Status:   O2 Device: None (Room air) (01/25/23 1003)   Adequate oxygenation and airway patent    Complications related to anesthesia: None    Post-anesthesia assessment completed. No concerns    Signed By: Antonio Harmon MD     January 25, 2023              Post-Anesthesia Evaluation and Assessment    Patient: Olesya Marie MRN: [id number]  SSN: [id number]    YOB: 1950  Age: 67 y.o. Sex: female      I have evaluated the patient and they are stable and ready for discharge from the PACU. Cardiovascular Function/Vital Signs  Visit Vitals  /74   Pulse 93   Temp (!) 35.7 °C (96.2 °F)   Resp 16   Ht 5' 5\" (1.651 m)   Wt 90.3 kg (199 lb)   SpO2 98%   BMI 33.12 kg/m²       Patient is status post MAC anesthesia for Procedure(s):  COLONOSCOPY  COLON BIOPSY. Nausea/Vomiting: None    Postoperative hydration reviewed and adequate. Pain:  Pain Scale 1: Numeric (0 - 10) (01/25/23 1003)  Pain Intensity 1: 0 (01/25/23 1003)   Managed    Neurological Status:        At baseline    Mental Status, Level of Consciousness: Alert and  oriented to person, place, and time    Pulmonary Status:   O2 Device: None (Room air) (01/25/23 1003)   Adequate oxygenation and airway patent    Complications related to anesthesia: None    Post-anesthesia assessment completed. No concerns    Signed By: Isis Mitchell MD     January 25, 2023              Procedure(s):  COLONOSCOPY  COLON BIOPSY. MAC    <BSHSIANPOST>    INITIAL Post-op Vital signs:   Vitals Value Taken Time   /74 01/25/23 1015   Temp 35.7 °C (96.2 °F) 01/25/23 1003   Pulse 96 01/25/23 1015   Resp 10 01/25/23 1015   SpO2 98 % 01/25/23 1015   Vitals shown include unvalidated device data.

## 2023-04-22 DIAGNOSIS — J84.9 INTERSTITIAL LUNG DISEASE (HCC): Primary | ICD-10-CM

## 2023-05-27 ENCOUNTER — HOSPITAL ENCOUNTER (OUTPATIENT)
Facility: HOSPITAL | Age: 73
Discharge: HOME | End: 2023-05-30
Attending: INTERNAL MEDICINE
Payer: MEDICARE

## 2023-05-27 DIAGNOSIS — J84.9 ILD (INTERSTITIAL LUNG DISEASE) (HCC): ICD-10-CM

## 2023-05-27 PROCEDURE — 71250 CT THORAX DX C-: CPT

## 2024-02-13 ENCOUNTER — HOSPITAL ENCOUNTER (OUTPATIENT)
Facility: HOSPITAL | Age: 74
Setting detail: OUTPATIENT SURGERY
Discharge: HOME | End: 2024-02-13
Attending: INTERNAL MEDICINE | Admitting: INTERNAL MEDICINE
Payer: MEDICARE

## 2024-02-13 ENCOUNTER — ANESTHESIA (OUTPATIENT)
Facility: HOSPITAL | Age: 74
End: 2024-02-13
Payer: MEDICARE

## 2024-02-13 ENCOUNTER — ANESTHESIA EVENT (OUTPATIENT)
Facility: HOSPITAL | Age: 74
End: 2024-02-13
Payer: MEDICARE

## 2024-02-13 VITALS
RESPIRATION RATE: 17 BRPM | SYSTOLIC BLOOD PRESSURE: 115 MMHG | HEART RATE: 89 BPM | OXYGEN SATURATION: 99 % | TEMPERATURE: 98.2 F | WEIGHT: 200 LBS | DIASTOLIC BLOOD PRESSURE: 85 MMHG | HEIGHT: 64 IN | BODY MASS INDEX: 34.15 KG/M2

## 2024-02-13 PROCEDURE — 7100000011 HC PHASE II RECOVERY - ADDTL 15 MIN: Performed by: INTERNAL MEDICINE

## 2024-02-13 PROCEDURE — 2580000003 HC RX 258: Performed by: INTERNAL MEDICINE

## 2024-02-13 PROCEDURE — 3700000000 HC ANESTHESIA ATTENDED CARE: Performed by: INTERNAL MEDICINE

## 2024-02-13 PROCEDURE — 3600007502: Performed by: INTERNAL MEDICINE

## 2024-02-13 PROCEDURE — 3600007512: Performed by: INTERNAL MEDICINE

## 2024-02-13 PROCEDURE — 7100000010 HC PHASE II RECOVERY - FIRST 15 MIN: Performed by: INTERNAL MEDICINE

## 2024-02-13 PROCEDURE — 6360000002 HC RX W HCPCS: Performed by: ANESTHESIOLOGY

## 2024-02-13 PROCEDURE — 6370000000 HC RX 637 (ALT 250 FOR IP): Performed by: INTERNAL MEDICINE

## 2024-02-13 PROCEDURE — 2500000003 HC RX 250 WO HCPCS: Performed by: ANESTHESIOLOGY

## 2024-02-13 PROCEDURE — 3700000001 HC ADD 15 MINUTES (ANESTHESIA): Performed by: INTERNAL MEDICINE

## 2024-02-13 RX ORDER — SODIUM CHLORIDE 0.9 % (FLUSH) 0.9 %
5-40 SYRINGE (ML) INJECTION EVERY 12 HOURS SCHEDULED
Status: DISCONTINUED | OUTPATIENT
Start: 2024-02-13 | End: 2024-02-13 | Stop reason: HOSPADM

## 2024-02-13 RX ORDER — SODIUM CHLORIDE 9 MG/ML
25 INJECTION, SOLUTION INTRAVENOUS PRN
Status: DISCONTINUED | OUTPATIENT
Start: 2024-02-13 | End: 2024-02-13 | Stop reason: HOSPADM

## 2024-02-13 RX ORDER — SIMETHICONE 20 MG/.3ML
EMULSION ORAL PRN
Status: DISCONTINUED | OUTPATIENT
Start: 2024-02-13 | End: 2024-02-13 | Stop reason: ALTCHOICE

## 2024-02-13 RX ORDER — DENOSUMAB 60 MG/ML
60 INJECTION SUBCUTANEOUS
COMMUNITY

## 2024-02-13 RX ORDER — IBUPROFEN 600 MG/1
600 TABLET ORAL EVERY 6 HOURS PRN
COMMUNITY

## 2024-02-13 RX ORDER — SODIUM CHLORIDE 0.9 % (FLUSH) 0.9 %
5-40 SYRINGE (ML) INJECTION PRN
Status: DISCONTINUED | OUTPATIENT
Start: 2024-02-13 | End: 2024-02-13 | Stop reason: HOSPADM

## 2024-02-13 RX ORDER — LIDOCAINE HYDROCHLORIDE 20 MG/ML
INJECTION, SOLUTION EPIDURAL; INFILTRATION; INTRACAUDAL; PERINEURAL PRN
Status: DISCONTINUED | OUTPATIENT
Start: 2024-02-13 | End: 2024-02-13 | Stop reason: SDUPTHER

## 2024-02-13 RX ORDER — SODIUM CHLORIDE 9 MG/ML
INJECTION, SOLUTION INTRAVENOUS CONTINUOUS
Status: DISCONTINUED | OUTPATIENT
Start: 2024-02-13 | End: 2024-02-13 | Stop reason: HOSPADM

## 2024-02-13 RX ADMIN — PROPOFOL 40 MG: 10 INJECTION, EMULSION INTRAVENOUS at 08:53

## 2024-02-13 RX ADMIN — SODIUM CHLORIDE: 9 INJECTION, SOLUTION INTRAVENOUS at 08:46

## 2024-02-13 RX ADMIN — PROPOFOL 70 MG: 10 INJECTION, EMULSION INTRAVENOUS at 08:50

## 2024-02-13 RX ADMIN — PROPOFOL 50 MG: 10 INJECTION, EMULSION INTRAVENOUS at 08:52

## 2024-02-13 RX ADMIN — PROPOFOL 40 MG: 10 INJECTION, EMULSION INTRAVENOUS at 08:55

## 2024-02-13 RX ADMIN — PROPOFOL 30 MG: 10 INJECTION, EMULSION INTRAVENOUS at 08:58

## 2024-02-13 RX ADMIN — LIDOCAINE HYDROCHLORIDE 100 MG: 20 INJECTION, SOLUTION EPIDURAL; INFILTRATION; INTRACAUDAL; PERINEURAL at 08:49

## 2024-02-13 NOTE — H&P
73 y.o. female presents for open access colonoscopy for screening with history of poor prep 1 years ago on colonoscopy.  Additional H&P data will be attached on the day of procedure.    Adelita Zuleta Jr, MD

## 2024-02-13 NOTE — DISCHARGE INSTRUCTIONS
[facility]  [facility]  SRIDHAR Meadows Jr, MD  [phone]      February 13, 2024    Flores Carlisle  YOB: 1950    COLONOSCOPY DISCHARGE INSTRUCTIONS    If there is redness at IV site you should apply warm compress to area.  If redness or soreness persist contact Dr. Meadows's office or your primary care doctor.    There may be a slight amount of blood passed from the rectum.  Gaseous discomfort may develop, but walking, belching will help relieve this.  You may not operate a vehicle for 12 hours  You may not operate machinery or dangerous appliances for rest of today  You may not drink alcoholic beverages for 12 hours  Avoid making any critical decisions for 24 hours    DIET:  You may resume your normal diet, but some patients find that heavy or large meals may lead to indigestion or vomiting.  I suggest a light meal as first food intake.    MEDICATIONS:  The use of some over-the-counter pain medication may lead to bleeding after colon biopsies or polyp removal.  Tylenol (also called acetaminophen) is safe to take even if you have had colonoscopy with polyp removal.  Based on the procedure you had today you may safely take aspirin or aspirin-like products for the next seven (7) days.  Remember that Tylenol (also called acetaminophen) is safe to take after colonoscopy even if you have had biopsies or polyps removed.    ACTIVITY:  You may resume your normal household activities, but it is recommended that you spend the remainder of the day resting -  avoid any strenuous activity.    CALL DR. MEADOWS'S OFFICE IF:  Increasing pain, nausea, vomiting  Abdominal distension (swelling)  Significant new or increased bleeding (oral or rectal)  Fever/Chills  Chest pain/shortness of breath                       Additional instructions:   Impression:  There is mild sigmoid diverticulosis. There are small internal

## 2024-02-13 NOTE — ANESTHESIA POSTPROCEDURE EVALUATION
Department of Anesthesiology  Postprocedure Note    Patient: Flores Carlisle  MRN: [id number]  YOB: 1950  Date of evaluation: 2/13/2024    Procedure Summary       Date: 02/13/24 Room / Location: [facility] ENDO 03 / [facility] ENDOSCOPY    Anesthesia Start: 0844 Anesthesia Stop: 0903    Procedure: COLONOSCOPY DIAGNOSTIC Diagnosis: Personal history of colonic polyps    Surgeons: Adelita Zuleta MD Responsible Provider: Lamberto Rawls Jr., MD    Anesthesia Type: MAC ASA Status: 2            Anesthesia Type: MAC    Kathya Phase I: Kathya Score: 10    Kathya Phase II: Kathya Score: 10    Anesthesia Post Evaluation    Patient location during evaluation: PACU  Patient participation: complete - patient participated  Level of consciousness: awake  Airway patency: patent  Nausea & Vomiting: no nausea  Cardiovascular status: blood pressure returned to baseline and hemodynamically stable  Respiratory status: acceptable  Hydration status: stable    No notable events documented.

## 2024-02-13 NOTE — INTERVAL H&P NOTE
Pre-Endoscopy H&P Update  Chief complaint/HPI/ROS:  The indication for the procedure, the patient's history and the patient's current medications are reviewed prior to the procedure and that data is reported on the H&P to which this document is attached.  Any significant complaints with regard to organ systems will be noted, and if not mentioned then a review of systems is not contributory.  Past Medical History:   Diagnosis Date    HLD (hyperlipidemia)     HTN (hypertension)     Rheumatoid arthritis (HCC)       Past Surgical History:   Procedure Laterality Date    APPENDECTOMY      BREAST BIOPSY Left     Surgical BX (Over 20yrs ago) - BENIGN     COLONOSCOPY N/A 2023    COLONOSCOPY performed by Olman Villegas MD at [facility] ENDOSCOPY    OTHER SURGICAL HISTORY      Breast Biopsy     Social   Social History     Tobacco Use    Smoking status: Former     Current packs/day: 0.00     Types: Cigarettes     Quit date: 10/26/2016     Years since quittin.3    Smokeless tobacco: Never   Substance Use Topics    Alcohol use: Yes      No family history on file.   Allergies   Allergen Reactions    Iodine Rash      Prior to Admission Medications   Prescriptions Last Dose Informant Patient Reported? Taking?   leflunomide (ARAVA) 20 MG tablet   Yes No   Sig: Take 20 mg by mouth daily   leucovorin calcium (WELLCOVORIN) 5 MG tablet   Yes No   Sig: Take 5 mg by mouth daily   loperamide (IMODIUM A-D) 2 MG tablet   Yes No   Sig: Take 4 mg by mouth as needed   telmisartan (MICARDIS) 80 MG tablet   Yes No   Sig: Take 80 mg by mouth daily      Facility-Administered Medications: None       PHYSICAL EXAM:  The patient is examined immediately prior to the procedure.  There were no vitals filed for this visit.  Gen: Appears comfortable, no distress.  Pulm: comfortable respirations with no abnormal audible breath sounds  HEART: well perfused, no abnormal audible heart sounds  GI: abdomen flat.    PLAN:  Informed consent discussion held,

## 2024-02-13 NOTE — OP NOTE
[facility]  Formerly [facility]  SRIDHAR Zuleta Jr, MD  [phone]      2024    Colonoscopy Procedure Note  Flores Carlisle  :  1950  [facility] Medical Record Number: [id number]    Indications:   Personal history of colon polyps  PCP:  Sage Mnedez MD  Anesthesia/Sedation: See Anesthesia Record  Endoscopist:  Dr. SRIDHAR Zuleta Jr  Complications:  None  Estimated Blood Loss:  None    Surgical assistant: Circulator: Nilson Shipman RN; Maggie Price RN     Implants none unless otherwise specified.     Permit:  The indications, risks, benefits and alternatives were reviewed with the patient or their decision maker who was provided an opportunity to ask questions and all questions were answered.  The specific risks of colonoscopy with conscious sedation were reviewed, including but not limited to anesthetic complication, bleeding, adverse drug reaction, missed lesion, infection, IV site reactions, and intestinal perforation which would lead to the need for surgical repair.  Alternatives to colonoscopy including radiographic imaging, observation without testing, or laboratory testing were reviewed including the limitations of those alternatives.  After considering the options and having all their questions answered, the patient or their decision maker provided both verbal and written consent to proceed.        Procedure in Detail:  After obtaining informed consent, positioning of the patient in the left lateral decubitus position, and conduction of a pre-procedure pause or \"time out\" the endoscope was introduced into the anus and advanced to the cecum, which was identified by the ileocecal valve and appendiceal orifice.  The quality of the colonic preparation was good.  A careful inspection was made as the colonoscope was withdrawn, findings and interventions are described below.    Findings:   There is mild sigmoid

## 2024-06-10 ENCOUNTER — HOSPITAL ENCOUNTER (OUTPATIENT)
Facility: HOSPITAL | Age: 74
Discharge: HOME | End: 2024-06-13
Attending: INTERNAL MEDICINE

## 2024-06-10 DIAGNOSIS — J84.9 ILD (INTERSTITIAL LUNG DISEASE) (HCC): ICD-10-CM

## 2024-06-10 PROCEDURE — 71250 CT THORAX DX C-: CPT

## 2024-07-21 ENCOUNTER — HOSPITAL ENCOUNTER (EMERGENCY)
Facility: HOSPITAL | Age: 74
Discharge: HOME | End: 2024-07-21
Attending: EMERGENCY MEDICINE
Payer: MEDICARE

## 2024-07-21 ENCOUNTER — APPOINTMENT (OUTPATIENT)
Facility: HOSPITAL | Age: 74
End: 2024-07-21
Payer: MEDICARE

## 2024-07-21 VITALS
OXYGEN SATURATION: 98 % | WEIGHT: 204.37 LBS | TEMPERATURE: 98.3 F | RESPIRATION RATE: 18 BRPM | SYSTOLIC BLOOD PRESSURE: 130 MMHG | HEART RATE: 98 BPM | DIASTOLIC BLOOD PRESSURE: 79 MMHG | BODY MASS INDEX: 35.08 KG/M2

## 2024-07-21 DIAGNOSIS — E87.1 HYPONATREMIA: ICD-10-CM

## 2024-07-21 DIAGNOSIS — R51.9 ACUTE NONINTRACTABLE HEADACHE, UNSPECIFIED HEADACHE TYPE: Primary | ICD-10-CM

## 2024-07-21 DIAGNOSIS — S05.01XA ABRASION OF RIGHT CORNEA, INITIAL ENCOUNTER: ICD-10-CM

## 2024-07-21 LAB
ALBUMIN SERPL-MCNC: 3.7 G/DL (ref 3.5–5)
ALBUMIN/GLOB SERPL: 1.1 (ref 1.1–2.2)
ALP SERPL-CCNC: 54 U/L (ref 45–117)
ALT SERPL-CCNC: 39 U/L (ref 12–78)
ANION GAP SERPL CALC-SCNC: 6 MMOL/L (ref 5–15)
AST SERPL-CCNC: 16 U/L (ref 15–37)
BASOPHILS # BLD: 0.1 K/UL (ref 0–0.1)
BASOPHILS NFR BLD: 1 % (ref 0–1)
BILIRUB SERPL-MCNC: 1.2 MG/DL (ref 0.2–1)
BUN SERPL-MCNC: 12 MG/DL (ref 6–20)
BUN/CREAT SERPL: 16 (ref 12–20)
CALCIUM SERPL-MCNC: 9.2 MG/DL (ref 8.5–10.1)
CHLORIDE SERPL-SCNC: 93 MMOL/L (ref 97–108)
CO2 SERPL-SCNC: 28 MMOL/L (ref 21–32)
COMMENT:: NORMAL
CREAT SERPL-MCNC: 0.73 MG/DL (ref 0.55–1.02)
CRP SERPL-MCNC: <0.29 MG/DL (ref 0–0.3)
DIFFERENTIAL METHOD BLD: ABNORMAL
EOSINOPHIL # BLD: 0.3 K/UL (ref 0–0.4)
EOSINOPHIL NFR BLD: 3 % (ref 0–7)
ERYTHROCYTE [DISTWIDTH] IN BLOOD BY AUTOMATED COUNT: 12.4 % (ref 11.5–14.5)
ERYTHROCYTE [SEDIMENTATION RATE] IN BLOOD: 1 MM/HR (ref 0–30)
GLOBULIN SER CALC-MCNC: 3.3 G/DL (ref 2–4)
GLUCOSE SERPL-MCNC: 120 MG/DL (ref 65–100)
HCT VFR BLD AUTO: 43.7 % (ref 35–47)
HGB BLD-MCNC: 15.4 G/DL (ref 11.5–16)
IMM GRANULOCYTES # BLD AUTO: 0 K/UL (ref 0–0.04)
IMM GRANULOCYTES NFR BLD AUTO: 0 % (ref 0–0.5)
LYMPHOCYTES # BLD: 1.1 K/UL (ref 0.8–3.5)
LYMPHOCYTES NFR BLD: 13 % (ref 12–49)
MCH RBC QN AUTO: 34.5 PG (ref 26–34)
MCHC RBC AUTO-ENTMCNC: 35.2 G/DL (ref 30–36.5)
MCV RBC AUTO: 98 FL (ref 80–99)
MONOCYTES # BLD: 0.9 K/UL (ref 0–1)
MONOCYTES NFR BLD: 11 % (ref 5–13)
NEUTS SEG # BLD: 6.2 K/UL (ref 1.8–8)
NEUTS SEG NFR BLD: 72 % (ref 32–75)
NRBC # BLD: 0 K/UL (ref 0–0.01)
NRBC BLD-RTO: 0 PER 100 WBC
PLATELET # BLD AUTO: 302 K/UL (ref 150–400)
PMV BLD AUTO: 9 FL (ref 8.9–12.9)
POTASSIUM SERPL-SCNC: 4.2 MMOL/L (ref 3.5–5.1)
PROT SERPL-MCNC: 7 G/DL (ref 6.4–8.2)
RBC # BLD AUTO: 4.46 M/UL (ref 3.8–5.2)
SODIUM SERPL-SCNC: 127 MMOL/L (ref 136–145)
SPECIMEN HOLD: NORMAL
WBC # BLD AUTO: 8.6 K/UL (ref 3.6–11)

## 2024-07-21 PROCEDURE — 86140 C-REACTIVE PROTEIN: CPT

## 2024-07-21 PROCEDURE — 36415 COLL VENOUS BLD VENIPUNCTURE: CPT

## 2024-07-21 PROCEDURE — 2580000003 HC RX 258: Performed by: EMERGENCY MEDICINE

## 2024-07-21 PROCEDURE — 85025 COMPLETE CBC W/AUTO DIFF WBC: CPT

## 2024-07-21 PROCEDURE — 70450 CT HEAD/BRAIN W/O DYE: CPT

## 2024-07-21 PROCEDURE — 99284 EMERGENCY DEPT VISIT MOD MDM: CPT

## 2024-07-21 PROCEDURE — 96360 HYDRATION IV INFUSION INIT: CPT

## 2024-07-21 PROCEDURE — 85652 RBC SED RATE AUTOMATED: CPT

## 2024-07-21 PROCEDURE — 6370000000 HC RX 637 (ALT 250 FOR IP): Performed by: EMERGENCY MEDICINE

## 2024-07-21 PROCEDURE — 80053 COMPREHEN METABOLIC PANEL: CPT

## 2024-07-21 RX ORDER — TETRACAINE HYDROCHLORIDE 5 MG/ML
1 SOLUTION OPHTHALMIC
Status: COMPLETED | OUTPATIENT
Start: 2024-07-21 | End: 2024-07-21

## 2024-07-21 RX ORDER — 0.9 % SODIUM CHLORIDE 0.9 %
1000 INTRAVENOUS SOLUTION INTRAVENOUS ONCE
Status: COMPLETED | OUTPATIENT
Start: 2024-07-21 | End: 2024-07-21

## 2024-07-21 RX ORDER — CIPROFLOXACIN HYDROCHLORIDE 3.5 MG/ML
2 SOLUTION/ DROPS TOPICAL 4 TIMES DAILY
Qty: 5 ML | Refills: 0 | Status: SHIPPED | OUTPATIENT
Start: 2024-07-21 | End: 2024-07-31

## 2024-07-21 RX ADMIN — TETRACAINE HYDROCHLORIDE 1 DROP: 5 SOLUTION OPHTHALMIC at 09:19

## 2024-07-21 RX ADMIN — FLUORESCEIN SODIUM 1 MG: 1 STRIP OPHTHALMIC at 09:19

## 2024-07-21 RX ADMIN — SODIUM CHLORIDE 1000 ML: 9 INJECTION, SOLUTION INTRAVENOUS at 10:10

## 2024-07-21 NOTE — ED PROVIDER NOTES
[facility] EMERGENCY DEP  EMERGENCY DEPARTMENT ENCOUNTER      Pt Name: Flores Carlisle  MRN: [id number]  Birthdate 1950  Date of evaluation: 7/21/2024  Provider: LIZBETH Philippe    CHIEF COMPLAINT       Chief Complaint   Patient presents with    Headache    Eye Pain         HISTORY OF PRESENT ILLNESS   (Location/Symptom, Timing/Onset, Context/Setting, Quality, Duration, Modifying Factors, Severity)  Note limiting factors.   Flores Carlisle is a 74 y.o. female with past medical history as listed below who presents to the emergency department for evaluation of headache and eye pain.  States that she has had a headache daily since June 6, she was seen at an outside ER and had a negative CT and also followed up with her PCP who told her her sodium was 125 and to increase her dietary sodium and also started her on allergy medication.  She also states for the past few days she has been having some right eye pain, stating \"it feels like there is sand in my eye\", she denies any trauma or known foreign body.  Denies vision changes.  Does not wear glasses or contacts.  Denies any crusting or purulent discharge from the eye.  Has a history of ocular migraines but states her current symptoms feels different from that.      Nursing Notes were reviewed.    REVIEW OF SYSTEMS    (2-9 systems for level 4, 10 or more for level 5)     Review of Systems   Constitutional:  Negative for fever.   HENT:  Negative for congestion and sore throat.    Eyes:  Positive for pain. Negative for visual disturbance.   Respiratory:  Negative for cough and shortness of breath.    Cardiovascular:  Negative for chest pain.   Gastrointestinal:  Negative for abdominal pain, diarrhea and vomiting.   Genitourinary:  Negative for dysuria.   Musculoskeletal:  Negative for back pain and neck pain.   Skin:  Negative for color change.   Neurological:  Positive for headaches. Negative for dizziness.   Psychiatric/Behavioral:  Negative for confusion.

## 2024-07-21 NOTE — ED TRIAGE NOTES
Triage: Pt arrives ambulatory from home with CC of feeling as though she has something stuck in her right eye. She denies any activity that may have caused debris to get stuck in her eye. She also reports having had a headache since June. She has had a CT scan and been prescribed allergy medications by her PCP. She reports motrin works for pain but only for about 4 hours and then the headache returns. She reports hx of ocular migraines.

## 2024-08-26 ENCOUNTER — TRANSCRIBE ORDERS (OUTPATIENT)
Facility: HOSPITAL | Age: 74
End: 2024-08-26

## 2024-08-26 DIAGNOSIS — J84.9 INTERSTITIAL LUNG DISEASE (HCC): Primary | ICD-10-CM

## 2024-12-10 ENCOUNTER — HOSPITAL ENCOUNTER (OUTPATIENT)
Facility: HOSPITAL | Age: 74
Discharge: HOME | End: 2024-12-13
Attending: INTERNAL MEDICINE
Payer: MEDICARE

## 2024-12-10 DIAGNOSIS — J84.9 INTERSTITIAL LUNG DISEASE (HCC): ICD-10-CM

## 2024-12-10 PROCEDURE — 71250 CT THORAX DX C-: CPT

## 2025-02-10 ENCOUNTER — TRANSCRIBE ORDERS (OUTPATIENT)
Facility: HOSPITAL | Age: 75
End: 2025-02-10

## 2025-02-10 DIAGNOSIS — J90 PLEURAL EFFUSION: Primary | ICD-10-CM

## 2025-03-06 ENCOUNTER — HOSPITAL ENCOUNTER (OUTPATIENT)
Facility: HOSPITAL | Age: 75
Discharge: HOME | End: 2025-03-09
Attending: INTERNAL MEDICINE
Payer: MEDICARE

## 2025-03-06 VITALS
DIASTOLIC BLOOD PRESSURE: 90 MMHG | HEIGHT: 64 IN | WEIGHT: 200 LBS | SYSTOLIC BLOOD PRESSURE: 146 MMHG | RESPIRATION RATE: 14 BRPM | BODY MASS INDEX: 34.15 KG/M2 | OXYGEN SATURATION: 97 % | HEART RATE: 106 BPM

## 2025-03-06 DIAGNOSIS — J90 PLEURAL EFFUSION: ICD-10-CM

## 2025-03-06 LAB
CHOLEST FLD-MCNC: 92 MG/DL
GLUCOSE FLD-MCNC: 96 MG/DL
LDH FLD L TO P-CCNC: 188 U/L
PROT FLD-MCNC: 3.9 G/DL
SPECIMEN SOURCE FLD: NORMAL
TRIGL FLD-MCNC: 32 MG/DL

## 2025-03-06 PROCEDURE — 87205 SMEAR GRAM STAIN: CPT

## 2025-03-06 PROCEDURE — 87015 SPECIMEN INFECT AGNT CONCNTJ: CPT

## 2025-03-06 PROCEDURE — 83615 LACTATE (LD) (LDH) ENZYME: CPT

## 2025-03-06 PROCEDURE — 71045 X-RAY EXAM CHEST 1 VIEW: CPT

## 2025-03-06 PROCEDURE — 84311 SPECTROPHOTOMETRY: CPT

## 2025-03-06 PROCEDURE — 32555 ASPIRATE PLEURA W/ IMAGING: CPT

## 2025-03-06 PROCEDURE — 82945 GLUCOSE OTHER FLUID: CPT

## 2025-03-06 PROCEDURE — 88305 TISSUE EXAM BY PATHOLOGIST: CPT

## 2025-03-06 PROCEDURE — 87116 MYCOBACTERIA CULTURE: CPT

## 2025-03-06 PROCEDURE — 87206 SMEAR FLUORESCENT/ACID STAI: CPT

## 2025-03-06 PROCEDURE — 87102 FUNGUS ISOLATION CULTURE: CPT

## 2025-03-06 PROCEDURE — 76942 ECHO GUIDE FOR BIOPSY: CPT

## 2025-03-06 PROCEDURE — 84478 ASSAY OF TRIGLYCERIDES: CPT

## 2025-03-06 PROCEDURE — 6360000002 HC RX W HCPCS

## 2025-03-06 PROCEDURE — 89050 BODY FLUID CELL COUNT: CPT

## 2025-03-06 PROCEDURE — 88112 CYTOPATH CELL ENHANCE TECH: CPT

## 2025-03-06 PROCEDURE — 84157 ASSAY OF PROTEIN OTHER: CPT

## 2025-03-06 PROCEDURE — 87070 CULTURE OTHR SPECIMN AEROBIC: CPT

## 2025-03-06 RX ORDER — LIDOCAINE HYDROCHLORIDE 10 MG/ML
INJECTION, SOLUTION EPIDURAL; INFILTRATION; INTRACAUDAL; PERINEURAL PRN
Status: COMPLETED | OUTPATIENT
Start: 2025-03-06 | End: 2025-03-06

## 2025-03-06 RX ADMIN — LIDOCAINE HYDROCHLORIDE 8 ML: 10 INJECTION, SOLUTION EPIDURAL; INFILTRATION; INTRACAUDAL; PERINEURAL at 14:01

## 2025-03-06 ASSESSMENT — PAIN - FUNCTIONAL ASSESSMENT: PAIN_FUNCTIONAL_ASSESSMENT: 0-10

## 2025-03-06 NOTE — PROGRESS NOTES
1345: Pt arrives via wheelchair to angio department for thoracentesis procedure. All assessments completed and consent was reviewed.  Education given was regarding procedure, no sedation, post-procedure care and  management/follow-up. Opportunity for questions was provided and all questions and concerns were addressed.     325 mL yellow fluid drained.    1424: Chest xray bedside.    1430: Patient given copy of discharge instructions and verbalized understanding.  Patient wheeled to exit via wheelchair. Patient in NAD. No bleeding noted at puncture site.

## 2025-03-06 NOTE — DISCHARGE INSTRUCTIONS
Thoracentesis: What to Expect at Home  Your Recovery  Thoracentesis (say \"iyyr-jf-fqp-RUTH-sis\") is a procedure to remove fluid from the space between the lungs and the chest wall (pleural space). This procedure may also be called a \"chest tap.\" It's normal to have a small amount of fluid in the pleural space. But too much fluid can build up because of problems such as infection, heart failure, or lung cancer. The procedure may have been done to help with shortness of breath and pain caused by the fluid buildup. Or you may have had this procedure so the doctor could test the fluid to find the cause of the buildup.  Your chest may be sore where the doctor put the needle or catheter into your skin (the puncture site). This usually gets better after a day or two. You can go back to work or your normal activities as soon as you feel up to it.  If a large amount of pleural fluid was removed during the procedure, you will probably be able to breathe more easily.  If more pleural fluid collects and needs to be removed, another thoracentesis may be done later.  This care sheet gives you a general idea about how long it will take for you to recover. But each person recovers at a different pace. Follow the steps below to feel better as quickly as possible.  How can you care for yourself at home?  Activity    Rest when you feel tired. Getting enough sleep will help you recover.     Avoid strenuous activities, such as bicycle riding, jogging, weight lifting, or aerobic exercise, until your doctor says it is okay.     You may shower tomorrow. Do not take a bath until the puncture site has healed, this may take 1-2 weeks.     Wait until tomorrow to drive.     You may need to take 1 or 2 days off from work. It depends on the type of work you do and how you feel.   Diet    You can eat your normal diet.     Drink plenty of fluids (unless your doctor tells you not to).   Medicines    Your doctor will tell you if and when you can

## 2025-03-07 LAB
APPEARANCE FLD: CLEAR
BACTERIA SPEC CULT: NORMAL
COLOR FLD: YELLOW
EOSINOPHIL NFR FLD MANUAL: 1 %
GRAM STN SPEC: NORMAL
LYMPHOCYTES NFR FLD: 57 %
MONOS+MACROS NFR FLD: 28 %
NEUTROPHILS NFR FLD: 14 %
NUC CELL # FLD: 642 /CU MM
RBC # FLD: 3 /CU MM
SERVICE CMNT-IMP: NORMAL
SPECIMEN SOURCE FLD: ABNORMAL

## 2025-03-09 LAB
ACID FAST STN SPEC: NEGATIVE
MYCOBACTERIUM SPEC QL CULT: NORMAL
SPECIMEN PREPARATION: NORMAL
SPECIMEN SOURCE: NORMAL

## 2025-03-13 LAB
ACID FAST STN SPEC: NEGATIVE
BACTERIA SPEC CULT: NORMAL
BACTERIA SPEC CULT: NORMAL
GRAM STN SPEC: NORMAL
MYCOBACTERIUM SPEC QL CULT: NORMAL
SERVICE CMNT-IMP: NORMAL
SERVICE CMNT-IMP: NORMAL
SPECIMEN PREPARATION: NORMAL
SPECIMEN SOURCE: NORMAL

## 2025-03-17 LAB
BACTERIA SPEC CULT: NORMAL
SERVICE CMNT-IMP: NORMAL

## 2025-03-24 LAB
BACTERIA SPEC CULT: NORMAL
SERVICE CMNT-IMP: NORMAL

## 2025-03-31 LAB
BACTERIA SPEC CULT: NORMAL
SERVICE CMNT-IMP: NORMAL

## 2025-04-07 LAB
BACTERIA SPEC CULT: NORMAL
SERVICE CMNT-IMP: NORMAL

## 2025-07-01 ENCOUNTER — HOSPITAL ENCOUNTER (INPATIENT)
Facility: HOSPITAL | Age: 75
LOS: 3 days | Discharge: HOME | DRG: 516 | End: 2025-07-04
Attending: EMERGENCY MEDICINE | Admitting: FAMILY MEDICINE
Payer: MEDICARE

## 2025-07-01 ENCOUNTER — APPOINTMENT (OUTPATIENT)
Facility: HOSPITAL | Age: 75
DRG: 516 | End: 2025-07-01
Payer: MEDICARE

## 2025-07-01 ENCOUNTER — APPOINTMENT (OUTPATIENT)
Facility: HOSPITAL | Age: 75
DRG: 516 | End: 2025-07-01
Attending: FAMILY MEDICINE
Payer: MEDICARE

## 2025-07-01 DIAGNOSIS — R60.0 PEDAL EDEMA: ICD-10-CM

## 2025-07-01 DIAGNOSIS — E87.1 HYPONATREMIA: Primary | ICD-10-CM

## 2025-07-01 DIAGNOSIS — M54.50 MIDLINE LOW BACK PAIN WITHOUT SCIATICA, UNSPECIFIED CHRONICITY: ICD-10-CM

## 2025-07-01 LAB
ALBUMIN SERPL-MCNC: 2.8 G/DL (ref 3.5–5)
ALBUMIN/GLOB SERPL: 0.6 (ref 1.1–2.2)
ALP SERPL-CCNC: 134 U/L (ref 45–117)
ALT SERPL-CCNC: 21 U/L (ref 12–78)
ANION GAP SERPL CALC-SCNC: 7 MMOL/L (ref 2–12)
ANION GAP SERPL CALC-SCNC: 8 MMOL/L (ref 2–12)
APPEARANCE UR: CLEAR
AST SERPL-CCNC: 15 U/L (ref 15–37)
BACTERIA URNS QL MICRO: NEGATIVE /HPF
BASOPHILS # BLD: 0.03 K/UL (ref 0–0.1)
BASOPHILS NFR BLD: 0.2 % (ref 0–1)
BILIRUB SERPL-MCNC: 0.6 MG/DL (ref 0.2–1)
BILIRUB UR QL: NEGATIVE
BUN SERPL-MCNC: 12 MG/DL (ref 6–20)
BUN SERPL-MCNC: 14 MG/DL (ref 6–20)
BUN/CREAT SERPL: 17 (ref 12–20)
BUN/CREAT SERPL: 21 (ref 12–20)
CALCIUM SERPL-MCNC: 7.9 MG/DL (ref 8.5–10.1)
CALCIUM SERPL-MCNC: 8.4 MG/DL (ref 8.5–10.1)
CHLORIDE SERPL-SCNC: 89 MMOL/L (ref 97–108)
CHLORIDE SERPL-SCNC: 94 MMOL/L (ref 97–108)
CO2 SERPL-SCNC: 26 MMOL/L (ref 21–32)
CO2 SERPL-SCNC: 27 MMOL/L (ref 21–32)
COLOR UR: ABNORMAL
COMMENT:: NORMAL
CREAT SERPL-MCNC: 0.68 MG/DL (ref 0.55–1.02)
CREAT SERPL-MCNC: 0.69 MG/DL (ref 0.55–1.02)
D DIMER PPP FEU-MCNC: 2.04 MG/L FEU (ref 0–0.65)
DIFFERENTIAL METHOD BLD: ABNORMAL
EOSINOPHIL # BLD: 0.15 K/UL (ref 0–0.4)
EOSINOPHIL NFR BLD: 1.2 % (ref 0–7)
EPITH CASTS URNS QL MICRO: ABNORMAL /LPF
ERYTHROCYTE [DISTWIDTH] IN BLOOD BY AUTOMATED COUNT: 13.7 % (ref 11.5–14.5)
GLOBULIN SER CALC-MCNC: 4.4 G/DL (ref 2–4)
GLUCOSE BLD STRIP.AUTO-MCNC: 73 MG/DL (ref 65–117)
GLUCOSE SERPL-MCNC: 112 MG/DL (ref 65–100)
GLUCOSE SERPL-MCNC: 144 MG/DL (ref 65–100)
GLUCOSE UR STRIP.AUTO-MCNC: NEGATIVE MG/DL
HCT VFR BLD AUTO: 37.2 % (ref 35–47)
HGB BLD-MCNC: 12.7 G/DL (ref 11.5–16)
HGB UR QL STRIP: NEGATIVE
IMM GRANULOCYTES # BLD AUTO: 0.08 K/UL (ref 0–0.04)
IMM GRANULOCYTES NFR BLD AUTO: 0.7 % (ref 0–0.5)
KETONES UR QL STRIP.AUTO: NEGATIVE MG/DL
LACTATE SERPL-SCNC: 0.9 MMOL/L (ref 0.4–2)
LEUKOCYTE ESTERASE UR QL STRIP.AUTO: ABNORMAL
LYMPHOCYTES # BLD: 1.56 K/UL (ref 0.8–3.5)
LYMPHOCYTES NFR BLD: 12.9 % (ref 12–49)
MAGNESIUM SERPL-MCNC: 2.5 MG/DL (ref 1.6–2.4)
MCH RBC QN AUTO: 30.6 PG (ref 26–34)
MCHC RBC AUTO-ENTMCNC: 34.1 G/DL (ref 30–36.5)
MCV RBC AUTO: 89.6 FL (ref 80–99)
MONOCYTES # BLD: 1.37 K/UL (ref 0–1)
MONOCYTES NFR BLD: 11.4 % (ref 5–13)
NEUTS SEG # BLD: 8.86 K/UL (ref 1.8–8)
NEUTS SEG NFR BLD: 73.6 % (ref 32–75)
NITRITE UR QL STRIP.AUTO: NEGATIVE
NRBC # BLD: 0 K/UL (ref 0–0.01)
NRBC BLD-RTO: 0 PER 100 WBC
NT PRO BNP: 92 PG/ML
OSMOLALITY UR: 159 MOSM/KG H2O
PH UR STRIP: 5.5 (ref 5–8)
PLATELET # BLD AUTO: 464 K/UL (ref 150–400)
PMV BLD AUTO: 8.8 FL (ref 8.9–12.9)
POTASSIUM SERPL-SCNC: 4.2 MMOL/L (ref 3.5–5.1)
POTASSIUM SERPL-SCNC: 4.3 MMOL/L (ref 3.5–5.1)
PROCALCITONIN SERPL-MCNC: <0.05 NG/ML
PROT SERPL-MCNC: 7.2 G/DL (ref 6.4–8.2)
PROT UR STRIP-MCNC: NEGATIVE MG/DL
RBC # BLD AUTO: 4.15 M/UL (ref 3.8–5.2)
RBC #/AREA URNS HPF: ABNORMAL /HPF (ref 0–5)
SERVICE CMNT-IMP: NORMAL
SODIUM SERPL-SCNC: 124 MMOL/L (ref 136–145)
SODIUM SERPL-SCNC: 127 MMOL/L (ref 136–145)
SP GR UR REFRACTOMETRY: 1.02 (ref 1–1.03)
SPECIMEN HOLD: NORMAL
SPECIMEN HOLD: NORMAL
UROBILINOGEN UR QL STRIP.AUTO: 1 EU/DL (ref 0.2–1)
WBC # BLD AUTO: 12.1 K/UL (ref 3.6–11)
WBC URNS QL MICRO: ABNORMAL /HPF (ref 0–4)

## 2025-07-01 PROCEDURE — 82962 GLUCOSE BLOOD TEST: CPT

## 2025-07-01 PROCEDURE — 6370000000 HC RX 637 (ALT 250 FOR IP): Performed by: FAMILY MEDICINE

## 2025-07-01 PROCEDURE — 85025 COMPLETE CBC W/AUTO DIFF WBC: CPT

## 2025-07-01 PROCEDURE — 83735 ASSAY OF MAGNESIUM: CPT

## 2025-07-01 PROCEDURE — 84300 ASSAY OF URINE SODIUM: CPT

## 2025-07-01 PROCEDURE — 6360000002 HC RX W HCPCS: Performed by: EMERGENCY MEDICINE

## 2025-07-01 PROCEDURE — 85379 FIBRIN DEGRADATION QUANT: CPT

## 2025-07-01 PROCEDURE — 80048 BASIC METABOLIC PNL TOTAL CA: CPT

## 2025-07-01 PROCEDURE — 93970 EXTREMITY STUDY: CPT

## 2025-07-01 PROCEDURE — 71045 X-RAY EXAM CHEST 1 VIEW: CPT

## 2025-07-01 PROCEDURE — 83935 ASSAY OF URINE OSMOLALITY: CPT

## 2025-07-01 PROCEDURE — 2500000003 HC RX 250 WO HCPCS: Performed by: FAMILY MEDICINE

## 2025-07-01 PROCEDURE — 6360000002 HC RX W HCPCS: Performed by: FAMILY MEDICINE

## 2025-07-01 PROCEDURE — 2060000000 HC ICU INTERMEDIATE R&B

## 2025-07-01 PROCEDURE — 87040 BLOOD CULTURE FOR BACTERIA: CPT

## 2025-07-01 PROCEDURE — 81001 URINALYSIS AUTO W/SCOPE: CPT

## 2025-07-01 PROCEDURE — 84145 PROCALCITONIN (PCT): CPT

## 2025-07-01 PROCEDURE — 83605 ASSAY OF LACTIC ACID: CPT

## 2025-07-01 PROCEDURE — 2580000003 HC RX 258: Performed by: EMERGENCY MEDICINE

## 2025-07-01 PROCEDURE — 99285 EMERGENCY DEPT VISIT HI MDM: CPT

## 2025-07-01 PROCEDURE — 83930 ASSAY OF BLOOD OSMOLALITY: CPT

## 2025-07-01 PROCEDURE — 80053 COMPREHEN METABOLIC PANEL: CPT

## 2025-07-01 PROCEDURE — 2580000003 HC RX 258: Performed by: FAMILY MEDICINE

## 2025-07-01 PROCEDURE — 72131 CT LUMBAR SPINE W/O DYE: CPT

## 2025-07-01 PROCEDURE — 83880 ASSAY OF NATRIURETIC PEPTIDE: CPT

## 2025-07-01 PROCEDURE — 96374 THER/PROPH/DIAG INJ IV PUSH: CPT

## 2025-07-01 RX ORDER — SODIUM CHLORIDE 0.9 % (FLUSH) 0.9 %
5-40 SYRINGE (ML) INJECTION PRN
Status: DISCONTINUED | OUTPATIENT
Start: 2025-07-01 | End: 2025-07-04 | Stop reason: HOSPADM

## 2025-07-01 RX ORDER — ONDANSETRON 4 MG/1
4 TABLET, ORALLY DISINTEGRATING ORAL EVERY 8 HOURS PRN
Status: DISCONTINUED | OUTPATIENT
Start: 2025-07-01 | End: 2025-07-04 | Stop reason: HOSPADM

## 2025-07-01 RX ORDER — LEUCOVORIN CALCIUM 5 MG/1
5 TABLET ORAL DAILY
Status: CANCELLED | OUTPATIENT
Start: 2025-07-01

## 2025-07-01 RX ORDER — TRAMADOL HYDROCHLORIDE 50 MG/1
50 TABLET ORAL EVERY 6 HOURS PRN
Status: DISCONTINUED | OUTPATIENT
Start: 2025-07-01 | End: 2025-07-04 | Stop reason: HOSPADM

## 2025-07-01 RX ORDER — ENOXAPARIN SODIUM 100 MG/ML
40 INJECTION SUBCUTANEOUS DAILY
Status: DISCONTINUED | OUTPATIENT
Start: 2025-07-01 | End: 2025-07-01

## 2025-07-01 RX ORDER — LOSARTAN POTASSIUM 50 MG/1
100 TABLET ORAL DAILY
Status: CANCELLED | OUTPATIENT
Start: 2025-07-01

## 2025-07-01 RX ORDER — KETOROLAC TROMETHAMINE 30 MG/ML
15 INJECTION, SOLUTION INTRAMUSCULAR; INTRAVENOUS ONCE
Status: COMPLETED | OUTPATIENT
Start: 2025-07-01 | End: 2025-07-01

## 2025-07-01 RX ORDER — SODIUM CHLORIDE 0.9 % (FLUSH) 0.9 %
5-40 SYRINGE (ML) INJECTION EVERY 12 HOURS SCHEDULED
Status: DISCONTINUED | OUTPATIENT
Start: 2025-07-01 | End: 2025-07-04 | Stop reason: HOSPADM

## 2025-07-01 RX ORDER — SODIUM CHLORIDE 9 MG/ML
INJECTION, SOLUTION INTRAVENOUS PRN
Status: DISCONTINUED | OUTPATIENT
Start: 2025-07-01 | End: 2025-07-04 | Stop reason: HOSPADM

## 2025-07-01 RX ORDER — METHOTREXATE 2.5 MG/1
10 TABLET ORAL WEEKLY
Status: CANCELLED | OUTPATIENT
Start: 2025-07-01

## 2025-07-01 RX ORDER — ACETAMINOPHEN 325 MG/1
650 TABLET ORAL EVERY 6 HOURS PRN
Status: DISCONTINUED | OUTPATIENT
Start: 2025-07-01 | End: 2025-07-04 | Stop reason: HOSPADM

## 2025-07-01 RX ORDER — POLYETHYLENE GLYCOL 3350 17 G/17G
17 POWDER, FOR SOLUTION ORAL DAILY PRN
Status: DISCONTINUED | OUTPATIENT
Start: 2025-07-01 | End: 2025-07-04 | Stop reason: HOSPADM

## 2025-07-01 RX ORDER — 0.9 % SODIUM CHLORIDE 0.9 %
1000 INTRAVENOUS SOLUTION INTRAVENOUS ONCE
Status: COMPLETED | OUTPATIENT
Start: 2025-07-01 | End: 2025-07-01

## 2025-07-01 RX ORDER — ONDANSETRON 2 MG/ML
4 INJECTION INTRAMUSCULAR; INTRAVENOUS EVERY 6 HOURS PRN
Status: DISCONTINUED | OUTPATIENT
Start: 2025-07-01 | End: 2025-07-04 | Stop reason: HOSPADM

## 2025-07-01 RX ORDER — ACETAMINOPHEN 650 MG/1
650 SUPPOSITORY RECTAL EVERY 6 HOURS PRN
Status: DISCONTINUED | OUTPATIENT
Start: 2025-07-01 | End: 2025-07-04 | Stop reason: HOSPADM

## 2025-07-01 RX ADMIN — SODIUM CHLORIDE, PRESERVATIVE FREE 10 ML: 5 INJECTION INTRAVENOUS at 20:59

## 2025-07-01 RX ADMIN — PIPERACILLIN AND TAZOBACTAM 4500 MG: 4; .5 INJECTION, POWDER, LYOPHILIZED, FOR SOLUTION INTRAVENOUS at 14:35

## 2025-07-01 RX ADMIN — TRAMADOL HYDROCHLORIDE 50 MG: 50 TABLET, COATED ORAL at 15:57

## 2025-07-01 RX ADMIN — PIPERACILLIN AND TAZOBACTAM 3375 MG: 3; .375 INJECTION, POWDER, LYOPHILIZED, FOR SOLUTION INTRAVENOUS at 20:54

## 2025-07-01 RX ADMIN — KETOROLAC TROMETHAMINE 15 MG: 30 INJECTION, SOLUTION INTRAMUSCULAR; INTRAVENOUS at 10:07

## 2025-07-01 RX ADMIN — TRAMADOL HYDROCHLORIDE 50 MG: 50 TABLET, COATED ORAL at 23:21

## 2025-07-01 RX ADMIN — SODIUM CHLORIDE 1000 ML: 0.9 INJECTION, SOLUTION INTRAVENOUS at 11:01

## 2025-07-01 RX ADMIN — VANCOMYCIN HYDROCHLORIDE 2250 MG: 10 INJECTION, POWDER, LYOPHILIZED, FOR SOLUTION INTRAVENOUS at 17:58

## 2025-07-01 RX ADMIN — ACETAMINOPHEN 650 MG: 325 TABLET ORAL at 20:46

## 2025-07-01 RX ADMIN — SODIUM CHLORIDE, PRESERVATIVE FREE 10 ML: 5 INJECTION INTRAVENOUS at 20:49

## 2025-07-01 RX ADMIN — ACETAMINOPHEN 650 MG: 325 TABLET ORAL at 13:40

## 2025-07-01 ASSESSMENT — PAIN DESCRIPTION - ORIENTATION
ORIENTATION: LOWER
ORIENTATION: MID
ORIENTATION: RIGHT;LEFT
ORIENTATION: LOWER
ORIENTATION: RIGHT;LEFT
ORIENTATION: LOWER
ORIENTATION: OTHER (COMMENT)
ORIENTATION: MID

## 2025-07-01 ASSESSMENT — PAIN DESCRIPTION - DESCRIPTORS
DESCRIPTORS: ACHING;DULL
DESCRIPTORS: OTHER (COMMENT)
DESCRIPTORS: ACHING
DESCRIPTORS: DULL;THROBBING
DESCRIPTORS: ACHING
DESCRIPTORS: DULL;THROBBING
DESCRIPTORS: ACHING;DULL
DESCRIPTORS: ACHING;DULL
DESCRIPTORS: ACHING

## 2025-07-01 ASSESSMENT — PAIN DESCRIPTION - LOCATION
LOCATION: BACK
LOCATION: GENERALIZED
LOCATION: BACK

## 2025-07-01 ASSESSMENT — PAIN DESCRIPTION - FREQUENCY
FREQUENCY: CONTINUOUS
FREQUENCY: INTERMITTENT

## 2025-07-01 ASSESSMENT — PAIN DESCRIPTION - PAIN TYPE
TYPE: ACUTE PAIN
TYPE: CHRONIC PAIN

## 2025-07-01 ASSESSMENT — PAIN SCALES - GENERAL
PAINLEVEL_OUTOF10: 7
PAINLEVEL_OUTOF10: 8
PAINLEVEL_OUTOF10: 8
PAINLEVEL_OUTOF10: 6
PAINLEVEL_OUTOF10: 6
PAINLEVEL_OUTOF10: 4
PAINLEVEL_OUTOF10: 5
PAINLEVEL_OUTOF10: 8
PAINLEVEL_OUTOF10: 7

## 2025-07-01 ASSESSMENT — ENCOUNTER SYMPTOMS
COLOR CHANGE: 0
VOMITING: 0
ABDOMINAL PAIN: 0
BACK PAIN: 1
CONSTIPATION: 0
SHORTNESS OF BREATH: 0
DIARRHEA: 0
NAUSEA: 0

## 2025-07-01 ASSESSMENT — PAIN - FUNCTIONAL ASSESSMENT
PAIN_FUNCTIONAL_ASSESSMENT: 0-10
PAIN_FUNCTIONAL_ASSESSMENT: PREVENTS OR INTERFERES SOME ACTIVE ACTIVITIES AND ADLS
PAIN_FUNCTIONAL_ASSESSMENT: PREVENTS OR INTERFERES SOME ACTIVE ACTIVITIES AND ADLS
PAIN_FUNCTIONAL_ASSESSMENT: ACTIVITIES ARE NOT PREVENTED
PAIN_FUNCTIONAL_ASSESSMENT: PREVENTS OR INTERFERES SOME ACTIVE ACTIVITIES AND ADLS

## 2025-07-01 ASSESSMENT — PAIN DESCRIPTION - ONSET
ONSET: ON-GOING
ONSET: ON-GOING

## 2025-07-01 NOTE — ED NOTES
TRANSFER - OUT REPORT:    Verbal report given to ISMAEL Ellsworth on Flores Carlisle  being transferred to [facility] for routine progression of patient care       Report consisted of patient's Situation, Background, Assessment and   Recommendations(SBAR).     Information from the following report(s) Nurse Handoff Report and ED SBAR was reviewed with the receiving nurse.    [place] Fall Assessment:    Presents to emergency department  because of falls (Syncope, seizure, or loss of consciousness): No  Age > 70: Yes  Altered Mental Status, Intoxication with alcohol or substance confusion (Disorientation, impaired judgment, poor safety awaremess, or inability to follow instructions): No  Impaired Mobility: Ambulates or transfers with assistive devices or assistance; Unable to ambulate or transer.: Yes  Nursing Judgement: Yes          Lines:   Peripheral IV 07/01/25 Left Antecubital (Active)   Site Assessment Clean, dry & intact 07/01/25 0847   Line Status Blood return noted;Specimen collected;Normal saline locked;Flushed 07/01/25 0847   Line Care Connections checked and tightened 07/01/25 0847   Phlebitis Assessment No symptoms 07/01/25 0847   Infiltration Assessment 0 07/01/25 0847   Dressing Status Clean, dry & intact 07/01/25 0847   Dressing Type Transparent 07/01/25 0847   Dressing Intervention New 07/01/25 0847       Peripheral IV 07/01/25 Left (Active)        Opportunity for questions and clarification was provided.      Patient transported with:

## 2025-07-01 NOTE — ED NOTES
Patient is a 74-year-old female with past medical history of hyperlipidemia, hypertension, rheumatoid arthritis presenting with concerns of mid back pain since last Thursday whom she has seen her rheumatologist, Dr. Power for and has also seen [facility] who had x-rays completed and recommended an MRI which she is scheduled for.  Has concerns that she took tramadol yesterday that was prescribed by her rheumatologist and it caused both of her legs to swell and cause her to throw up today.  She did take the tramadol again this morning.  She also reports that she slept in the recliner last night which is not normal for her.  Leg swelling for started yesterday and that was also the first day that she took the tramadol.  Denies any recent illness, denies shortness of breath, denies chest pain, denies abdominal pain.  Denies any urinary symptoms, no numbness or tingling of extremities, no incontinence, no fevers. Patient's airway is patent and no angioedema appreciated on initial examination.    8:17 AM  I have evaluated the patient as the Provider in Rapid Medical Evaluation (RME). I have reviewed her vital signs and the triage nurse assessment. I have talked with the patient and any available family and advised that I am the provider in triage and have ordered the appropriate study to initiate their work up based on the clinical presentation during my assessment. I have advised that the patient will be accommodated in the Main ED as soon as possible. I have also requested to contact the triage nurse or myself immediately if the patient experiences any changes in their condition during this brief waiting period.  CHARLIE Prince Kayla G, PA-C  07/01/25 6932

## 2025-07-01 NOTE — ED TRIAGE NOTES
Pt c/o back pain and was started on Tramadol, pt stated she thinks she is allergic to Tramadol because now her legs are swollen and she vomited , back pain started last Thursday, denies cp or sob, denies fever, +n/v , denies incontinence, denies numbness/tingling to legs

## 2025-07-01 NOTE — ED PROVIDER NOTES
[facility] EMERGENCY DEPARTMENT  EMERGENCY DEPARTMENT ENCOUNTER      Pt Name: Flores Carlisle  MRN: [id number]  Birthdate 1950  Date of evaluation: 7/1/2025  Provider: Milad Alexis MD    CHIEF COMPLAINT       Chief Complaint   Patient presents with    Back Pain         HISTORY OF PRESENT ILLNESS   (Location/Symptom, Timing/Onset, Context/Setting, Quality, Duration, Modifying Factors, Severity)  Note limiting factors.   Flores Carlisle is a 74 y.o. female who presents to the emergency department      The history is provided by the patient and the spouse. No  was used.       Nursing Notes were reviewed.    REVIEW OF SYSTEMS    (2-9 systems for level 4, 10 or more for level 5)     Review of Systems   Constitutional:  Negative for activity change, chills and fever.   HENT:  Negative for nosebleeds.    Eyes:  Negative for visual disturbance.   Respiratory:  Negative for shortness of breath.    Cardiovascular:  Positive for leg swelling. Negative for chest pain and palpitations.   Gastrointestinal:  Negative for abdominal pain, constipation, diarrhea, nausea and vomiting.   Genitourinary:  Negative for difficulty urinating, dysuria, hematuria and urgency.   Musculoskeletal:  Positive for back pain. Negative for neck pain and neck stiffness.   Skin:  Negative for color change.   Allergic/Immunologic: Negative for immunocompromised state.   Neurological:  Negative for dizziness, seizures, syncope, weakness, light-headedness, numbness and headaches.   Psychiatric/Behavioral:  Negative for behavioral problems, confusion, hallucinations, self-injury and suicidal ideas.        Except as noted above the remainder of the review of systems was reviewed and negative.       PAST MEDICAL HISTORY     Past Medical History:   Diagnosis Date    HLD (hyperlipidemia)     HTN (hypertension)     Rheumatoid arthritis (HCC)          SURGICAL HISTORY       Past Surgical History:   Procedure Laterality Date

## 2025-07-01 NOTE — PLAN OF CARE
Problem: Discharge Planning  Goal: Discharge to home or other facility with appropriate resources  Outcome: Progressing  Flowsheets (Taken 7/1/2025 5230)  Discharge to home or other facility with appropriate resources:   Identify barriers to discharge with patient and caregiver   Arrange for needed discharge resources and transportation as appropriate   Identify discharge learning needs (meds, wound care, etc)     Problem: Pain  Goal: Verbalizes/displays adequate comfort level or baseline comfort level  Outcome: Progressing     Problem: Safety - Adult  Goal: Free from fall injury  Outcome: Progressing

## 2025-07-02 ENCOUNTER — APPOINTMENT (OUTPATIENT)
Facility: HOSPITAL | Age: 75
DRG: 516 | End: 2025-07-02
Attending: FAMILY MEDICINE
Payer: MEDICARE

## 2025-07-02 ENCOUNTER — APPOINTMENT (OUTPATIENT)
Facility: HOSPITAL | Age: 75
DRG: 516 | End: 2025-07-02
Payer: MEDICARE

## 2025-07-02 LAB
ANION GAP SERPL CALC-SCNC: 16 MMOL/L (ref 2–12)
ANION GAP SERPL CALC-SCNC: 6 MMOL/L (ref 2–12)
ANION GAP SERPL CALC-SCNC: 6 MMOL/L (ref 2–12)
BASOPHILS # BLD: 0.07 K/UL (ref 0–0.1)
BASOPHILS NFR BLD: 0.8 % (ref 0–1)
BUN SERPL-MCNC: 11 MG/DL (ref 6–20)
BUN SERPL-MCNC: 9 MG/DL (ref 6–20)
BUN SERPL-MCNC: 9 MG/DL (ref 6–20)
BUN/CREAT SERPL: 14 (ref 12–20)
BUN/CREAT SERPL: 15 (ref 12–20)
BUN/CREAT SERPL: 17 (ref 12–20)
CALCIUM SERPL-MCNC: 7.9 MG/DL (ref 8.5–10.1)
CALCIUM SERPL-MCNC: 8.2 MG/DL (ref 8.5–10.1)
CHLORIDE SERPL-SCNC: 100 MMOL/L (ref 97–108)
CHLORIDE SERPL-SCNC: 102 MMOL/L (ref 97–108)
CHLORIDE SERPL-SCNC: 98 MMOL/L (ref 97–108)
CO2 SERPL-SCNC: 17 MMOL/L (ref 21–32)
CO2 SERPL-SCNC: 24 MMOL/L (ref 21–32)
CO2 SERPL-SCNC: 26 MMOL/L (ref 21–32)
CORTIS AM PEAK SERPL-MCNC: 15.5 UG/DL (ref 4.3–22.45)
CREAT SERPL-MCNC: 0.6 MG/DL (ref 0.55–1.02)
CREAT SERPL-MCNC: 0.63 MG/DL (ref 0.55–1.02)
CREAT SERPL-MCNC: 0.64 MG/DL (ref 0.55–1.02)
DIFFERENTIAL METHOD BLD: ABNORMAL
ECHO AO ROOT DIAM: 3.2 CM
ECHO AO ROOT INDEX: 1.71 CM/M2
ECHO AV AREA PEAK VELOCITY: 1.2 CM2
ECHO AV AREA VTI: 1.1 CM2
ECHO AV AREA/BSA PEAK VELOCITY: 0.6 CM2/M2
ECHO AV AREA/BSA VTI: 0.6 CM2/M2
ECHO AV MEAN GRADIENT: 18 MMHG
ECHO AV MEAN VELOCITY: 2 M/S
ECHO AV PEAK GRADIENT: 31 MMHG
ECHO AV PEAK VELOCITY: 2.8 M/S
ECHO AV VELOCITY RATIO: 0.36
ECHO AV VTI: 52.7 CM
ECHO BSA: 1.93 M2
ECHO LA DIAMETER INDEX: 1.87 CM/M2
ECHO LA DIAMETER: 3.5 CM
ECHO LA TO AORTIC ROOT RATIO: 1.09
ECHO LV E' LATERAL VELOCITY: 6.78 CM/S
ECHO LV E' SEPTAL VELOCITY: 7.05 CM/S
ECHO LV EF PHYSICIAN: 60 %
ECHO LV FRACTIONAL SHORTENING: 43 % (ref 28–44)
ECHO LV INTERNAL DIMENSION DIASTOLE INDEX: 2.14 CM/M2
ECHO LV INTERNAL DIMENSION DIASTOLIC: 4 CM (ref 3.9–5.3)
ECHO LV INTERNAL DIMENSION SYSTOLIC INDEX: 1.23 CM/M2
ECHO LV INTERNAL DIMENSION SYSTOLIC: 2.3 CM
ECHO LV IVSD: 1.3 CM (ref 0.6–0.9)
ECHO LV MASS 2D: 186.5 G (ref 67–162)
ECHO LV MASS INDEX 2D: 99.8 G/M2 (ref 43–95)
ECHO LV POSTERIOR WALL DIASTOLIC: 1.3 CM (ref 0.6–0.9)
ECHO LV RELATIVE WALL THICKNESS RATIO: 0.65
ECHO LVOT AREA: 3.1 CM2
ECHO LVOT AV VTI INDEX: 0.33
ECHO LVOT DIAM: 2 CM
ECHO LVOT MEAN GRADIENT: 2 MMHG
ECHO LVOT PEAK GRADIENT: 4 MMHG
ECHO LVOT PEAK VELOCITY: 1 M/S
ECHO LVOT STROKE VOLUME INDEX: 29 ML/M2
ECHO LVOT SV: 54.3 ML
ECHO LVOT VTI: 17.3 CM
ECHO MV A VELOCITY: 0.71 M/S
ECHO MV AREA PHT: 6.8 CM2
ECHO MV E DECELERATION TIME (DT): 111.9 MS
ECHO MV E VELOCITY: 0.56 M/S
ECHO MV E/A RATIO: 0.79
ECHO MV E/E' LATERAL: 8.26
ECHO MV E/E' RATIO (AVERAGED): 8.1
ECHO MV E/E' SEPTAL: 7.94
ECHO MV PRESSURE HALF TIME (PHT): 32.4 MS
ECHO TV REGURGITANT MAX VELOCITY: 2.54 M/S
ECHO TV REGURGITANT PEAK GRADIENT: 26 MMHG
EOSINOPHIL # BLD: 0.2 K/UL (ref 0–0.4)
EOSINOPHIL NFR BLD: 2.2 % (ref 0–7)
ERYTHROCYTE [DISTWIDTH] IN BLOOD BY AUTOMATED COUNT: 13.8 % (ref 11.5–14.5)
GLUCOSE SERPL-MCNC: 112 MG/DL (ref 65–100)
GLUCOSE SERPL-MCNC: 93 MG/DL (ref 65–100)
HCT VFR BLD AUTO: 40.7 % (ref 35–47)
HGB BLD-MCNC: 13.1 G/DL (ref 11.5–16)
IMM GRANULOCYTES # BLD AUTO: 0.08 K/UL (ref 0–0.04)
IMM GRANULOCYTES NFR BLD AUTO: 0.9 % (ref 0–0.5)
LYMPHOCYTES # BLD: 1.32 K/UL (ref 0.8–3.5)
LYMPHOCYTES NFR BLD: 14.6 % (ref 12–49)
MCH RBC QN AUTO: 30.8 PG (ref 26–34)
MCHC RBC AUTO-ENTMCNC: 32.2 G/DL (ref 30–36.5)
MCV RBC AUTO: 95.8 FL (ref 80–99)
MONOCYTES # BLD: 0.86 K/UL (ref 0–1)
MONOCYTES NFR BLD: 9.5 % (ref 5–13)
NEUTS SEG # BLD: 6.51 K/UL (ref 1.8–8)
NEUTS SEG NFR BLD: 72 % (ref 32–75)
NRBC # BLD: 0 K/UL (ref 0–0.01)
NRBC BLD-RTO: 0 PER 100 WBC
PLATELET # BLD AUTO: 433 K/UL (ref 150–400)
PMV BLD AUTO: 8.8 FL (ref 8.9–12.9)
POTASSIUM SERPL-SCNC: 4.3 MMOL/L (ref 3.5–5.1)
POTASSIUM SERPL-SCNC: 4.6 MMOL/L (ref 3.5–5.1)
POTASSIUM SERPL-SCNC: 4.7 MMOL/L (ref 3.5–5.1)
RBC # BLD AUTO: 4.25 M/UL (ref 3.8–5.2)
SODIUM SERPL-SCNC: 128 MMOL/L (ref 136–145)
SODIUM SERPL-SCNC: 132 MMOL/L (ref 136–145)
SODIUM SERPL-SCNC: 135 MMOL/L (ref 136–145)
TSH SERPL DL<=0.05 MIU/L-ACNC: 1.35 UIU/ML (ref 0.36–3.74)
WBC # BLD AUTO: 9 K/UL (ref 3.6–11)

## 2025-07-02 PROCEDURE — 82533 TOTAL CORTISOL: CPT

## 2025-07-02 PROCEDURE — 6370000000 HC RX 637 (ALT 250 FOR IP): Performed by: HOSPITALIST

## 2025-07-02 PROCEDURE — 6360000004 HC RX CONTRAST MEDICATION: Performed by: HOSPITALIST

## 2025-07-02 PROCEDURE — 85025 COMPLETE CBC W/AUTO DIFF WBC: CPT

## 2025-07-02 PROCEDURE — 2580000003 HC RX 258: Performed by: FAMILY MEDICINE

## 2025-07-02 PROCEDURE — 2500000003 HC RX 250 WO HCPCS: Performed by: FAMILY MEDICINE

## 2025-07-02 PROCEDURE — 80048 BASIC METABOLIC PNL TOTAL CA: CPT

## 2025-07-02 PROCEDURE — 6370000000 HC RX 637 (ALT 250 FOR IP): Performed by: FAMILY MEDICINE

## 2025-07-02 PROCEDURE — 6360000002 HC RX W HCPCS: Performed by: FAMILY MEDICINE

## 2025-07-02 PROCEDURE — C8929 TTE W OR WO FOL WCON,DOPPLER: HCPCS

## 2025-07-02 PROCEDURE — A9579 GAD-BASE MR CONTRAST NOS,1ML: HCPCS | Performed by: FAMILY MEDICINE

## 2025-07-02 PROCEDURE — 84443 ASSAY THYROID STIM HORMONE: CPT

## 2025-07-02 PROCEDURE — 2060000000 HC ICU INTERMEDIATE R&B

## 2025-07-02 PROCEDURE — 72158 MRI LUMBAR SPINE W/O & W/DYE: CPT

## 2025-07-02 PROCEDURE — 6360000002 HC RX W HCPCS: Performed by: HOSPITALIST

## 2025-07-02 PROCEDURE — 6360000004 HC RX CONTRAST MEDICATION: Performed by: FAMILY MEDICINE

## 2025-07-02 RX ORDER — GADOTERIDOL 279.3 MG/ML
18 INJECTION INTRAVENOUS
Status: COMPLETED | OUTPATIENT
Start: 2025-07-02 | End: 2025-07-02

## 2025-07-02 RX ORDER — FUROSEMIDE 40 MG/1
40 TABLET ORAL DAILY
Status: DISCONTINUED | OUTPATIENT
Start: 2025-07-02 | End: 2025-07-03

## 2025-07-02 RX ORDER — KETOROLAC TROMETHAMINE 30 MG/ML
30 INJECTION, SOLUTION INTRAMUSCULAR; INTRAVENOUS EVERY 6 HOURS PRN
Status: DISCONTINUED | OUTPATIENT
Start: 2025-07-02 | End: 2025-07-03

## 2025-07-02 RX ADMIN — TRAMADOL HYDROCHLORIDE 50 MG: 50 TABLET, COATED ORAL at 17:31

## 2025-07-02 RX ADMIN — GADOTERIDOL 18 ML: 279.3 INJECTION, SOLUTION INTRAVENOUS at 20:53

## 2025-07-02 RX ADMIN — SULFUR HEXAFLUORIDE 2 ML: KIT at 09:49

## 2025-07-02 RX ADMIN — SODIUM CHLORIDE, PRESERVATIVE FREE 10 ML: 5 INJECTION INTRAVENOUS at 08:04

## 2025-07-02 RX ADMIN — SODIUM CHLORIDE, PRESERVATIVE FREE 10 ML: 5 INJECTION INTRAVENOUS at 21:18

## 2025-07-02 RX ADMIN — TRAMADOL HYDROCHLORIDE 50 MG: 50 TABLET, COATED ORAL at 08:04

## 2025-07-02 RX ADMIN — KETOROLAC TROMETHAMINE 30 MG: 30 INJECTION, SOLUTION INTRAMUSCULAR at 23:10

## 2025-07-02 RX ADMIN — ACETAMINOPHEN 650 MG: 325 TABLET ORAL at 09:52

## 2025-07-02 RX ADMIN — ACETAMINOPHEN 650 MG: 325 TABLET ORAL at 03:24

## 2025-07-02 RX ADMIN — FUROSEMIDE 40 MG: 40 TABLET ORAL at 16:01

## 2025-07-02 RX ADMIN — PIPERACILLIN AND TAZOBACTAM 3375 MG: 3; .375 INJECTION, POWDER, LYOPHILIZED, FOR SOLUTION INTRAVENOUS at 05:10

## 2025-07-02 RX ADMIN — VANCOMYCIN HYDROCHLORIDE 1000 MG: 1 INJECTION, POWDER, LYOPHILIZED, FOR SOLUTION INTRAVENOUS at 03:31

## 2025-07-02 RX ADMIN — ACETAMINOPHEN 650 MG: 325 TABLET ORAL at 21:18

## 2025-07-02 ASSESSMENT — PAIN SCALES - GENERAL
PAINLEVEL_OUTOF10: 7
PAINLEVEL_OUTOF10: 5
PAINLEVEL_OUTOF10: 0
PAINLEVEL_OUTOF10: 5
PAINLEVEL_OUTOF10: 5
PAINLEVEL_OUTOF10: 7

## 2025-07-02 ASSESSMENT — PAIN DESCRIPTION - DESCRIPTORS: DESCRIPTORS: DULL;THROBBING

## 2025-07-02 ASSESSMENT — PAIN DESCRIPTION - LOCATION
LOCATION: BACK

## 2025-07-02 ASSESSMENT — PAIN DESCRIPTION - ORIENTATION
ORIENTATION: MID
ORIENTATION: MID

## 2025-07-02 NOTE — PLAN OF CARE
Problem: Discharge Planning  Goal: Discharge to home or other facility with appropriate resources  7/1/2025 2352 by Reanna Hutton RN  Outcome: Progressing  7/1/2025 1859 by Swati Melara RN  Outcome: Progressing  Flowsheets (Taken 7/1/2025 1845)  Discharge to home or other facility with appropriate resources:   Identify barriers to discharge with patient and caregiver   Arrange for needed discharge resources and transportation as appropriate   Identify discharge learning needs (meds, wound care, etc)     Problem: Pain  Goal: Verbalizes/displays adequate comfort level or baseline comfort level  7/1/2025 2352 by Reanna Hutton, RN  Outcome: Progressing  7/1/2025 1859 by Swati Melara RN  Outcome: Progressing     Problem: Safety - Adult  Goal: Free from fall injury  7/1/2025 2352 by Reanna Hutton, RN  Outcome: Progressing  7/1/2025 1859 by Swati Melara, RN  Outcome: Progressing

## 2025-07-02 NOTE — CARE COORDINATION
Care Management Initial Assessment       RUR:  9%  Readmission? no  1st IM letter given? Yes - 7/1  1st  letter given: no n/a    CHAPINCITO: Pt admitted from home, dispo TBD   - pending PT OT eval; will see post-Kypho    Transport: family likely, pending progress      CM met with Pt  at bedside to introduce self and role.  Pt lives w/ spouse in a 2 story home w/ 4 AZIZA.     ADLs: Ind - includes driving   DME: none  PCP follow up: confirmed - Sierra Chaudhary - seen within months   Previous Home Health: none  Previous Skilled Nursing Facility: none  Previous Inpatient Rehab: none  Insurance verified: yes; Medicare A B, [employer]  Pharmacy: [facility] in [facility]  Emergency Contact:   Alexi Carlisle (Spouse)  [phone] (Mobile)    4pm: Verified face sheet and demographics w/ Pt - Ind at baseline w/o the use of DME, no Hx of HH/IPR/HH. PT OT to eval Pt after Kypho and share recommendations as appropriate. No CHAPINCITO needs indicated at this time - CM following.     CM will follow patient progress and assist as needed with CHAPINCITO plan.       07/02/25 4498   Service Assessment   Patient Orientation Person;Alert and Oriented;Place;Self;Situation   Cognition Alert   History Provided By Patient   Primary Caregiver Self   Support Systems Spouse/Significant Other   Patient's Healthcare Decision Maker is: Legal Next of Kin   PCP Verified by CM Yes  (Sierra Chaudhary)   Last Visit to PCP Within last 6 months   Prior Functional Level Independent in ADLs/IADLs   Current Functional Level Independent in ADLs/IADLs   Can patient return to prior living arrangement Yes   Ability to make needs known: Good   Family able to assist with home care needs: Yes   Would you like for me to discuss the discharge plan with any other family members/significant others, and if so, who? Yes   Financial Resources Medicare   Social/Functional History   Lives With Spouse   Type of Home House   Home Layout Two level   Home Access Stairs to enter with

## 2025-07-02 NOTE — CONSULTS
BIOPSY Left     Surgical BX (Over 20yrs ago) - BENIGN     COLONOSCOPY N/A 1/25/2023    COLONOSCOPY performed by Olman Villegas MD at [facility] ENDOSCOPY    COLONOSCOPY N/A 2/13/2024    COLONOSCOPY DIAGNOSTIC performed by Adelita Zuleta MD at [facility] ENDOSCOPY    OTHER SURGICAL HISTORY      Breast Biopsy        Social history:   Social History       Tobacco History       Smoking Status  Former Quit Date  10/26/2016 Smoking Tobacco Type  Cigarettes quit in 10/26/2016   Pack Year History     Packs/Day From To Years    0 10/26/2016  8.7    1   0.0      Smokeless Tobacco Use  Never              Alcohol History       Alcohol Use Status  Yes              Drug Use       Drug Use Status  No              Sexual Activity       Sexually Active  Not Asked                     Family history:  Not contributory    Allergies   Allergen Reactions    Tramadol Other (See Comments)     Leg swelling and nausea     Iodine Rash       Current Facility-Administered Medications   Medication Dose Route Frequency Provider Last Rate Last Admin    ketorolac (TORADOL) injection 30 mg  30 mg IntraVENous Q6H PRN Kelvin Venegas MD        furosemide (LASIX) tablet 40 mg  40 mg Oral Daily Kelvin Venegas MD   40 mg at 07/02/25 1601    sodium chloride flush 0.9 % injection 5-40 mL  5-40 mL IntraVENous 2 times per day Gerri Foster MD   10 mL at 07/02/25 0804    sodium chloride flush 0.9 % injection 5-40 mL  5-40 mL IntraVENous PRN Gerri Foster MD        0.9 % sodium chloride infusion   IntraVENous PRN Gerri Foster MD        ondansetron (ZOFRAN-ODT) disintegrating tablet 4 mg  4 mg Oral Q8H PRN Gerri Foster MD        Or    ondansetron (ZOFRAN) injection 4 mg  4 mg IntraVENous Q6H PRN Gerri Foster MD        polyethylene glycol (GLYCOLAX) packet 17 g  17 g Oral Daily PRN Gerri Foster MD        acetaminophen (TYLENOL) tablet 650 mg  650 mg Oral Q6H PRN Gerri Foster MD   650 mg at 07/02/25 0952    Or    acetaminophen (TYLENOL)

## 2025-07-03 LAB
25(OH)D3 SERPL-MCNC: 22.6 NG/ML (ref 30–100)
ALBUMIN SERPL-MCNC: 2.7 G/DL (ref 3.5–5)
ALBUMIN/GLOB SERPL: 0.7 (ref 1.1–2.2)
ALP SERPL-CCNC: 120 U/L (ref 45–117)
ALT SERPL-CCNC: 18 U/L (ref 12–78)
ANION GAP SERPL CALC-SCNC: 6 MMOL/L (ref 2–12)
ANION GAP SERPL CALC-SCNC: 7 MMOL/L (ref 2–12)
AST SERPL-CCNC: 8 U/L (ref 15–37)
BASOPHILS # BLD: 0.05 K/UL (ref 0–0.1)
BASOPHILS NFR BLD: 0.5 % (ref 0–1)
BILIRUB SERPL-MCNC: 0.4 MG/DL (ref 0.2–1)
BUN SERPL-MCNC: 10 MG/DL (ref 6–20)
BUN SERPL-MCNC: 10 MG/DL (ref 6–20)
BUN/CREAT SERPL: 16 (ref 12–20)
BUN/CREAT SERPL: 17 (ref 12–20)
CALCIUM SERPL-MCNC: 8.3 MG/DL (ref 8.5–10.1)
CALCIUM SERPL-MCNC: 8.4 MG/DL (ref 8.5–10.1)
CHLORIDE SERPL-SCNC: 97 MMOL/L (ref 97–108)
CHLORIDE SERPL-SCNC: 98 MMOL/L (ref 97–108)
CO2 SERPL-SCNC: 28 MMOL/L (ref 21–32)
CO2 SERPL-SCNC: 28 MMOL/L (ref 21–32)
CREAT SERPL-MCNC: 0.58 MG/DL (ref 0.55–1.02)
CREAT SERPL-MCNC: 0.64 MG/DL (ref 0.55–1.02)
DIFFERENTIAL METHOD BLD: ABNORMAL
EOSINOPHIL # BLD: 0.22 K/UL (ref 0–0.4)
EOSINOPHIL NFR BLD: 2.3 % (ref 0–7)
ERYTHROCYTE [DISTWIDTH] IN BLOOD BY AUTOMATED COUNT: 13.9 % (ref 11.5–14.5)
GLOBULIN SER CALC-MCNC: 3.9 G/DL (ref 2–4)
GLUCOSE SERPL-MCNC: 80 MG/DL (ref 65–100)
GLUCOSE SERPL-MCNC: 84 MG/DL (ref 65–100)
HCT VFR BLD AUTO: 37.6 % (ref 35–47)
HGB BLD-MCNC: 11.8 G/DL (ref 11.5–16)
IMM GRANULOCYTES # BLD AUTO: 0.05 K/UL (ref 0–0.04)
IMM GRANULOCYTES NFR BLD AUTO: 0.5 % (ref 0–0.5)
LYMPHOCYTES # BLD: 1.9 K/UL (ref 0.8–3.5)
LYMPHOCYTES NFR BLD: 20.1 % (ref 12–49)
MAGNESIUM SERPL-MCNC: 2.2 MG/DL (ref 1.6–2.4)
MCH RBC QN AUTO: 30.6 PG (ref 26–34)
MCHC RBC AUTO-ENTMCNC: 31.4 G/DL (ref 30–36.5)
MCV RBC AUTO: 97.4 FL (ref 80–99)
MONOCYTES # BLD: 0.98 K/UL (ref 0–1)
MONOCYTES NFR BLD: 10.4 % (ref 5–13)
NEUTS SEG # BLD: 6.23 K/UL (ref 1.8–8)
NEUTS SEG NFR BLD: 66.2 % (ref 32–75)
NRBC # BLD: 0 K/UL (ref 0–0.01)
NRBC BLD-RTO: 0 PER 100 WBC
PHOSPHATE SERPL-MCNC: 3.1 MG/DL (ref 2.6–4.7)
PLATELET # BLD AUTO: 434 K/UL (ref 150–400)
PMV BLD AUTO: 9 FL (ref 8.9–12.9)
POTASSIUM SERPL-SCNC: 4 MMOL/L (ref 3.5–5.1)
POTASSIUM SERPL-SCNC: 4.4 MMOL/L (ref 3.5–5.1)
PROT SERPL-MCNC: 6.6 G/DL (ref 6.4–8.2)
RBC # BLD AUTO: 3.86 M/UL (ref 3.8–5.2)
SODIUM SERPL-SCNC: 132 MMOL/L (ref 136–145)
SODIUM SERPL-SCNC: 132 MMOL/L (ref 136–145)
WBC # BLD AUTO: 9.4 K/UL (ref 3.6–11)

## 2025-07-03 PROCEDURE — 80053 COMPREHEN METABOLIC PANEL: CPT

## 2025-07-03 PROCEDURE — 97535 SELF CARE MNGMENT TRAINING: CPT

## 2025-07-03 PROCEDURE — 97116 GAIT TRAINING THERAPY: CPT

## 2025-07-03 PROCEDURE — 97165 OT EVAL LOW COMPLEX 30 MIN: CPT

## 2025-07-03 PROCEDURE — 6370000000 HC RX 637 (ALT 250 FOR IP): Performed by: INTERNAL MEDICINE

## 2025-07-03 PROCEDURE — 82306 VITAMIN D 25 HYDROXY: CPT

## 2025-07-03 PROCEDURE — 85025 COMPLETE CBC W/AUTO DIFF WBC: CPT

## 2025-07-03 PROCEDURE — 0QS03ZZ REPOSITION LUMBAR VERTEBRA, PERCUTANEOUS APPROACH: ICD-10-PCS | Performed by: HOSPITALIST

## 2025-07-03 PROCEDURE — 0QU03JZ SUPPLEMENT LUMBAR VERTEBRA WITH SYNTHETIC SUBSTITUTE, PERCUTANEOUS APPROACH: ICD-10-PCS | Performed by: HOSPITALIST

## 2025-07-03 PROCEDURE — 97161 PT EVAL LOW COMPLEX 20 MIN: CPT

## 2025-07-03 PROCEDURE — 83735 ASSAY OF MAGNESIUM: CPT

## 2025-07-03 PROCEDURE — 2500000003 HC RX 250 WO HCPCS: Performed by: FAMILY MEDICINE

## 2025-07-03 PROCEDURE — 6360000002 HC RX W HCPCS: Performed by: HOSPITALIST

## 2025-07-03 PROCEDURE — 6370000000 HC RX 637 (ALT 250 FOR IP): Performed by: FAMILY MEDICINE

## 2025-07-03 PROCEDURE — 84100 ASSAY OF PHOSPHORUS: CPT

## 2025-07-03 PROCEDURE — 2060000000 HC ICU INTERMEDIATE R&B

## 2025-07-03 RX ORDER — FUROSEMIDE 40 MG/1
40 TABLET ORAL ONCE
Status: COMPLETED | OUTPATIENT
Start: 2025-07-03 | End: 2025-07-03

## 2025-07-03 RX ORDER — KETOROLAC TROMETHAMINE 30 MG/ML
15 INJECTION, SOLUTION INTRAMUSCULAR; INTRAVENOUS EVERY 6 HOURS PRN
Status: DISCONTINUED | OUTPATIENT
Start: 2025-07-03 | End: 2025-07-04 | Stop reason: HOSPADM

## 2025-07-03 RX ORDER — ERGOCALCIFEROL 1.25 MG/1
50000 CAPSULE, LIQUID FILLED ORAL WEEKLY
Status: DISCONTINUED | OUTPATIENT
Start: 2025-07-03 | End: 2025-07-04 | Stop reason: HOSPADM

## 2025-07-03 RX ADMIN — KETOROLAC TROMETHAMINE 15 MG: 30 INJECTION, SOLUTION INTRAMUSCULAR at 21:21

## 2025-07-03 RX ADMIN — SODIUM CHLORIDE, PRESERVATIVE FREE 10 ML: 5 INJECTION INTRAVENOUS at 08:25

## 2025-07-03 RX ADMIN — SODIUM CHLORIDE, PRESERVATIVE FREE 10 ML: 5 INJECTION INTRAVENOUS at 08:24

## 2025-07-03 RX ADMIN — SODIUM CHLORIDE, PRESERVATIVE FREE 5 ML: 5 INJECTION INTRAVENOUS at 22:20

## 2025-07-03 RX ADMIN — FUROSEMIDE 40 MG: 40 TABLET ORAL at 15:44

## 2025-07-03 RX ADMIN — ACETAMINOPHEN 650 MG: 325 TABLET ORAL at 15:46

## 2025-07-03 NOTE — PLAN OF CARE
Problem: Discharge Planning  Goal: Discharge to home or other facility with appropriate resources  7/2/2025 2329 by Rosalva Goodwin RN  Outcome: Progressing  7/2/2025 2154 by Karlie Tineo RN  Outcome: Progressing  Flowsheets  Taken 7/2/2025 2000 by Rosalva Goodwin RN  Discharge to home or other facility with appropriate resources: Identify barriers to discharge with patient and caregiver  Taken 7/2/2025 0800 by Karlie Tineo RN  Discharge to home or other facility with appropriate resources:   Identify barriers to discharge with patient and caregiver   Arrange for needed discharge resources and transportation as appropriate   Identify discharge learning needs (meds, wound care, etc)   Arrange for interpreters to assist at discharge as needed   Refer to discharge planning if patient needs post-hospital services based on physician order or complex needs related to functional status, cognitive ability or social support system     Problem: Pain  Goal: Verbalizes/displays adequate comfort level or baseline comfort level  7/2/2025 2329 by Rosalva Goodwin RN  Outcome: Progressing  7/2/2025 2154 by Karlie Tineo RN  Outcome: Progressing     Problem: Safety - Adult  Goal: Free from fall injury  7/2/2025 2329 by Rosalva Goodwin RN  Outcome: Progressing  7/2/2025 2154 by Karlie Tineo RN  Outcome: Progressing  Flowsheets  Taken 7/2/2025 2000 by Rosalva Goodwin RN  Free From Fall Injury: Instruct family/caregiver on patient safety  Taken 7/2/2025 0800 by Karlie Tineo RN  Free From Fall Injury:   Instruct family/caregiver on patient safety   Based on caregiver fall risk screen, instruct family/caregiver to ask for assistance with transferring infant if caregiver noted to have fall risk factors     Problem: Chronic Conditions and Co-morbidities  Goal: Patient's chronic conditions and co-morbidity symptoms are monitored and maintained or improved  7/2/2025 2329 by Rosalva Goodwin RN  Outcome: Progressing  7/2/2025

## 2025-07-03 NOTE — PLAN OF CARE
Problem: Discharge Planning  Goal: Discharge to home or other facility with appropriate resources  Outcome: Progressing     Problem: Pain  Goal: Verbalizes/displays adequate comfort level or baseline comfort level  Outcome: Progressing     Problem: Safety - Adult  Goal: Free from fall injury  Outcome: Progressing  Flowsheets (Taken 7/2/2025 0800)  Free From Fall Injury:   Instruct family/caregiver on patient safety   Based on caregiver fall risk screen, instruct family/caregiver to ask for assistance with transferring infant if caregiver noted to have fall risk factors     Problem: Chronic Conditions and Co-morbidities  Goal: Patient's chronic conditions and co-morbidity symptoms are monitored and maintained or improved  7/2/2025 2154 by Karlie Tineo, RN  Outcome: Progressing  7/2/2025 2154 by Karlie Tineo, RN  Outcome: Progressing     Problem: Skin/Tissue Integrity  Goal: Skin integrity remains intact  Description: 1.  Monitor for areas of redness and/or skin breakdown  2.  Assess vascular access sites hourly  3.  Every 4-6 hours minimum:  Change oxygen saturation probe site  4.  Every 4-6 hours:  If on nasal continuous positive airway pressure, respiratory therapy assess nares and determine need for appliance change or resting period  Outcome: Progressing

## 2025-07-03 NOTE — PLAN OF CARE
Problem: Discharge Planning  Goal: Discharge to home or other facility with appropriate resources  7/3/2025 1118 by Pat Echevarria RN  Outcome: Progressing  Flowsheets (Taken 7/3/2025 0800)  Discharge to home or other facility with appropriate resources: Identify barriers to discharge with patient and caregiver  7/2/2025 2329 by Rosalva Goodwin RN  Outcome: Progressing  7/2/2025 2154 by Karlie Tineo RN  Outcome: Progressing  Flowsheets  Taken 7/2/2025 2000 by Rosalva Goodwin RN  Discharge to home or other facility with appropriate resources: Identify barriers to discharge with patient and caregiver  Taken 7/2/2025 0800 by Karlie Tineo RN  Discharge to home or other facility with appropriate resources:   Identify barriers to discharge with patient and caregiver   Arrange for needed discharge resources and transportation as appropriate   Identify discharge learning needs (meds, wound care, etc)   Arrange for interpreters to assist at discharge as needed   Refer to discharge planning if patient needs post-hospital services based on physician order or complex needs related to functional status, cognitive ability or social support system     Problem: Pain  Goal: Verbalizes/displays adequate comfort level or baseline comfort level  7/3/2025 1118 by Pat Echevarria RN  Outcome: Progressing  7/2/2025 2329 by Rosalva Goodwin RN  Outcome: Progressing  7/2/2025 2154 by Karlie Tineo RN  Outcome: Progressing     Problem: Safety - Adult  Goal: Free from fall injury  7/3/2025 1118 by Pat Echevarria RN  Outcome: Progressing  Flowsheets (Taken 7/3/2025 0800)  Free From Fall Injury:   Instruct family/caregiver on patient safety   Based on caregiver fall risk screen, instruct family/caregiver to ask for assistance with transferring infant if caregiver noted to have fall risk factors  7/2/2025 2329 by Rosalva Goodwin RN  Outcome: Progressing  7/2/2025 2154 by Karlie Tineo RN  Outcome: Progressing  Flowsheets  Taken

## 2025-07-04 VITALS
BODY MASS INDEX: 35.3 KG/M2 | WEIGHT: 199.3 LBS | HEART RATE: 111 BPM | RESPIRATION RATE: 23 BRPM | TEMPERATURE: 97.9 F | DIASTOLIC BLOOD PRESSURE: 88 MMHG | SYSTOLIC BLOOD PRESSURE: 137 MMHG | OXYGEN SATURATION: 94 %

## 2025-07-04 LAB
ANION GAP SERPL CALC-SCNC: 8 MMOL/L (ref 2–12)
BASOPHILS # BLD: 0.07 K/UL (ref 0–0.1)
BASOPHILS NFR BLD: 0.8 % (ref 0–1)
BUN SERPL-MCNC: 11 MG/DL (ref 6–20)
BUN/CREAT SERPL: 22 (ref 12–20)
CALCIUM SERPL-MCNC: 8.3 MG/DL (ref 8.5–10.1)
CHLORIDE SERPL-SCNC: 99 MMOL/L (ref 97–108)
CO2 SERPL-SCNC: 25 MMOL/L (ref 21–32)
CREAT SERPL-MCNC: 0.49 MG/DL (ref 0.55–1.02)
DIFFERENTIAL METHOD BLD: NORMAL
EOSINOPHIL # BLD: 0.2 K/UL (ref 0–0.4)
EOSINOPHIL NFR BLD: 2.2 % (ref 0–7)
ERYTHROCYTE [DISTWIDTH] IN BLOOD BY AUTOMATED COUNT: 13.7 % (ref 11.5–14.5)
GLUCOSE SERPL-MCNC: 103 MG/DL (ref 65–100)
HCT VFR BLD AUTO: 38.3 % (ref 35–47)
HGB BLD-MCNC: 12.3 G/DL (ref 11.5–16)
IMM GRANULOCYTES # BLD AUTO: 0.03 K/UL (ref 0–0.04)
IMM GRANULOCYTES NFR BLD AUTO: 0.3 % (ref 0–0.5)
LYMPHOCYTES # BLD: 1.35 K/UL (ref 0.8–3.5)
LYMPHOCYTES NFR BLD: 14.8 % (ref 12–49)
MAGNESIUM SERPL-MCNC: 2 MG/DL (ref 1.6–2.4)
MCH RBC QN AUTO: 30.5 PG (ref 26–34)
MCHC RBC AUTO-ENTMCNC: 32.1 G/DL (ref 30–36.5)
MCV RBC AUTO: 95 FL (ref 80–99)
MONOCYTES # BLD: 0.91 K/UL (ref 0–1)
MONOCYTES NFR BLD: 9.9 % (ref 5–13)
NEUTS SEG # BLD: 6.59 K/UL (ref 1.8–8)
NEUTS SEG NFR BLD: 72 % (ref 32–75)
NRBC # BLD: 0 K/UL (ref 0–0.01)
NRBC BLD-RTO: 0 PER 100 WBC
PHOSPHATE SERPL-MCNC: 3.1 MG/DL (ref 2.6–4.7)
PLATELET # BLD AUTO: 399 K/UL (ref 150–400)
PMV BLD AUTO: 9 FL (ref 8.9–12.9)
POTASSIUM SERPL-SCNC: 4.4 MMOL/L (ref 3.5–5.1)
RBC # BLD AUTO: 4.03 M/UL (ref 3.8–5.2)
SODIUM SERPL-SCNC: 132 MMOL/L (ref 136–145)
WBC # BLD AUTO: 9.2 K/UL (ref 3.6–11)

## 2025-07-04 PROCEDURE — 84100 ASSAY OF PHOSPHORUS: CPT

## 2025-07-04 PROCEDURE — 80048 BASIC METABOLIC PNL TOTAL CA: CPT

## 2025-07-04 PROCEDURE — 83735 ASSAY OF MAGNESIUM: CPT

## 2025-07-04 PROCEDURE — 2500000003 HC RX 250 WO HCPCS: Performed by: FAMILY MEDICINE

## 2025-07-04 PROCEDURE — 6360000002 HC RX W HCPCS: Performed by: HOSPITALIST

## 2025-07-04 PROCEDURE — 85025 COMPLETE CBC W/AUTO DIFF WBC: CPT

## 2025-07-04 RX ORDER — ERGOCALCIFEROL 1.25 MG/1
50000 CAPSULE ORAL WEEKLY
Qty: 5 CAPSULE | Refills: 0 | Status: SHIPPED | OUTPATIENT
Start: 2025-07-10 | End: 2025-08-15

## 2025-07-04 RX ADMIN — SODIUM CHLORIDE, PRESERVATIVE FREE 10 ML: 5 INJECTION INTRAVENOUS at 08:24

## 2025-07-04 RX ADMIN — KETOROLAC TROMETHAMINE 15 MG: 30 INJECTION, SOLUTION INTRAMUSCULAR at 05:29

## 2025-07-04 NOTE — PROGRESS NOTES
Name: Flores Carlisle   MRN: [id number]  : 1950    Nephrology Progress Note    Assessment:  Hyponatremia: acute on chronic issue. Admission Na 124-> improved to 135 -> repeat Na 132 and holding. Some suspicion of chronic SIADH. Urine studies this admission likely obtained while patient was showing aquaresis (during time when serum Na was improving). Poor solute intake likely playing some role along with chronic NSAID use.      HTN: stable     Low back pain: acute on chronic issue.    Plan/Recommendations:  Okay to give another dose of Furosemide  Needs to push solid intake  Place on FR 1.5L/day  Control pain as this is strong stimulator for ADH release  Avoid NSAIDS  Strict I/Os  Am labs  Discharge planning    Subjective:  Feeling slightly better. Trying to push intake.  at bedside.    ROS:   No nausea, no vomiting  No chest pain, no shortness of breath    Exam:  BP (!) 147/94   Pulse (!) 109   Temp 97.7 °F (36.5 °C) (Oral)   Resp 17   Wt 90.3 kg (199 lb)   SpO2 94%   BMI 35.25 kg/m²     Gen: NAD/obese  HEENT: AT/NC  Lungs/Chest wall: Clear. Equal BS. No respiratory distress  Cardiovascular: Regular rate, normal rhythm.   Abdomen/: Soft, NT,  BS+  Ext: trace peripheral edema  CNS: alert and awake.     Current Facility-Administered Medications   Medication Dose Route Frequency Provider Last Rate Last Admin    vitamin D (ERGOCALCIFEROL) capsule 50,000 Units  50,000 Units Oral Weekly Victor Hugo Perez MD        ketorolac (TORADOL) injection 30 mg  30 mg IntraVENous Q6H PRN Kelvin Venegas MD   30 mg at 25 2310    [Held by provider] furosemide (LASIX) tablet 40 mg  40 mg Oral Daily Kelvin Venegas MD   40 mg at 25 1601    sodium chloride flush 0.9 % injection 5-40 mL  5-40 mL IntraVENous 2 times per day Gerri Foster MD   10 mL at 25 
                                                                                                Hospitalist Progress Note  Kelvin Venegas MD  Answering service: [phone] OR [phone] from in house phone        Date of Service:  2025  NAME:  Flores Carlisle  :  1950  MRN:  [id number]      Admission Summary:   Flores Carlisle is a 74 y.o. female with a pmhx HTN, dyslipidemia, and RA who presents with mid back pain sin last week, and is being admitted for hyponatremia.  She has chronic lumbar back pain, and just finished PT 3 weeks ago.  Last Thursday, she woke up and her pain was acutely worse.  She went to her rheumatologist, and was prescribed baclofen that did not help with her pain.  She denies le weakness or paresthesia, bowel or bladder complaints     She states that she does drink a lot of water, and experiences early satiety.  She has noted b/l LE edema for the past two days.  She has a murmur and was evaluated by a cardiologist at [facility].        Interval history / Subjective:   F/u for back pain hyponatremia     Assessment & Plan:     # hypervolemic hyponatremia  -Na 132  -check Carlyn 15, Uosm and serum osm 159 pt drinks a lot of fluids  -s/p 1L normal saline, will hold further IVF pending urine studies  -consult nephrology if sodium worsens or does not improve     #lumbar back pain  -CT lumbar spine with severe superior endplate vertebral compression fracture at L1 with restropositioning of the L1 vertebral body  -chronic vertebral compression fracture at L4  -MRI lumbar spine with contrast-- pending may need kyphoplasty  -tramadol prn for pain added toradol     #b/l LE edema  -b/l LE venous duplex  to eval for dvt--No evidence of thrombosis in the well visualized segments of the bilateral lower extremities.   -probnp 92      #SIRS  #leukocytosis + tachycardia  -WBC 12.1 procal < 0.05   -d/c abx  -UA negative     #systolic murmur  -reports had echo  few months ago at [facility] and was told she was 
                                                                                                Hospitalist Progress Note  Victor Hugo Perez MD  Answering service: [phone] OR [phone] from in house phone        Date of Service:  7/3/2025  NAME:  Flores Carlisle  :  1950  MRN:  [id number]      Admission Summary:   Flores Carlisle is a 74 y.o. female with a pmhx HTN, dyslipidemia, and RA who presents with mid back pain sin last week, and is being admitted for hyponatremia.  She has chronic lumbar back pain, and just finished PT 3 weeks ago.  Last Thursday, she woke up and her pain was acutely worse.  She went to her rheumatologist, and was prescribed baclofen that did not help with her pain.  She denies le weakness or paresthesia, bowel or bladder complaints     She states that she does drink a lot of water, and experiences early satiety.  She has noted b/l LE edema for the past two days.  She has a murmur and was evaluated by a cardiologist at [facility].        Interval history / Subjective:   F/u for back pain hyponatremia  S/p kypho     Assessment & Plan:     # hypervolemic hyponatremia  -Na low but stable  -SIADH management  -nephrology recs appreciated     #lumbar back pain  -L1 L4 compression fx  -s/p kypho  -vit D low, start supplementation     #b/l LE edema  -b/l LE venous duplex  to eval for dvt--No evidence of thrombosis in the well visualized segments of the bilateral lower extremities.   -probnp 92      #SIRS  #leukocytosis + tachycardia  -WBC 12.1 procal < 0.05   -d/c abx  -UA negative     #systolic murmur  -reports had echo  few months ago at [facility] and was told she was stable  -echo     #RA  -hands without pain or swelling currently  -takes prolia q 6 months, and rituxan q 4 months  -takes prednisone prn, and took prednisone last week, but it did not help with back pain, so she stopped   -continue methotrexate 6mg q Thursday  -continue tramadol       #HTN  -continue arb per formulary   
Day #1 of zosyn  Indication:  sepsis  Current regimen:  3.375 gm q6  Abx regimen: zosyn  Recent Labs     25  0843   WBC 12.1*   CREATININE 0.68   BUN 14     Body mass index is 34.83 kg/m².  Est CrCl: 75 ml/min;   Temp (24hrs), Av.7 °F (36.5 °C), Min:97.7 °F (36.5 °C), Max:97.7 °F (36.5 °C)      Plan: Change to 4.5 gm loading dose followed by 3.375 gm infused over 4 hours        
Discharge instructions and education provided to patient at bedside. Opportunity for questions and clarification provided. Patient discharged home with .   
OCCUPATIONAL THERAPY EVALUATION/DISCHARGE  Patient: Flores Carlisle (74 y.o. female)  Date: 7/3/2025  Primary Diagnosis: Hyponatremia [E87.1]  Pedal edema [R60.0]  Midline low back pain without sciatica, unspecified chronicity [M54.50]         Precautions:  (strict I&O)                  ASSESSMENT :  Based on the objective data below, the patient presents near her IND baseline, requiring no more than SPV for BADLs and functional mobility with no AD s/p L1 & L4 kyphoplasty. She is able to tailor sit and mobilize in bathroom and hallway with no AD, reviewed spinal protection techniques with fair carryover. Only assist required was Min A for bed mobility with logroll technique,  able to assist PRN. Of note, patient tachycardic at rest -117 at rest, increasing to 129 post ADLs in bathroom and hallway ambulation. No further acute OT needs, cleared for d/c from OT standpoint.     Functional Outcome Measure:  The patient scored 23/24 on the AM-PAC outcome measure which is indicative of lower odds for rehab needs upon d/c.      Further skilled acute occupational therapy is not indicated at this time.     PLAN :  Recommend with staff: Recommend with nursing, ADLs with supervision/setup, OOB to chair 3x/day, and toileting via functional mobility to and from bathroom with SPV. Thank you for completing as able in order to maintain patient strength, endurance and independence.       Recommendation for discharge: (in order for the patient to meet his/her long term goals):   No skilled occupational therapy    Other factors to consider for discharge: no additional factors    IF patient discharges home will need the following DME: patient owns DME required for discharge     SUBJECTIVE:   Patient stated, “I can stand up, I'm fine. I'm going home today.”    OBJECTIVE DATA SUMMARY:     Past Medical History:   Diagnosis Date    HLD (hyperlipidemia)     HTN (hypertension)     Rheumatoid arthritis (HCC)      Past Surgical 
Occupational Therapy  07/02/25    Order acknowledged, chart reviewed. Patient admitted with LBP, found to have an severe endplate L1 compression fx with retropositioning, pending kyphoplasty. Will follow up post procedure as able/appropriate.     Thank you,   MAGDI Horan, OTR/L    
Occupational Therapy  07/03/25    Chart reviewed. Patient pending kyphoplasty this AM, will follow up later today vs tomorrow for OT evaluation as able/appropriate.     Thank you,   Maritza Peters, OTD, OTR/L    
PHYSICAL THERAPY    Orders acknowledged and chart reviewed in prep for PT evaluation. Patient off of the floor kyphoplasty this AM, will follow up later today vs tomorrow for PT evaluation as able/appropriate.     Puja Hargrove/PT  
PHYSICAL THERAPY EVALUATION/DISCHARGE    Patient: Flores Carlisle (74 y.o. female)  Date: 7/3/2025  Primary Diagnosis: Hyponatremia [E87.1]  Pedal edema [R60.0]  Midline low back pain without sciatica, unspecified chronicity [M54.50]       Precautions: Restrictions/Precautions  Restrictions/Precautions: Fall Risk (I & O)            ASSESSMENT AND RECOMMENDATIONS:  Based on the objective data below, the patient is close to baseline LOF s/p kyphoplasty today. PLOF: Independent with ADLs and IADLs. Patient lives with  and brother in a 2 story home with bed and bath on second floor (15 steps with spindles to hold onto) and 4 steps to enter with rail.    Patient performed bed mobility with minimal assistance, transfers with supervision and gait and stairs with stand by assistance. Gait is slightly antalgic but steady.    Functional Outcome Measure:  The patient scored 85/100 on the Barthel outcome measure which is indicative of minimal impaired ability to care for basic self-needs/dependency on others.    Patient is cleared for discharge from PT standpoint. .          Further skilled acute physical therapy is not indicated at this time.       PLAN :  Recommendation for discharge: (in order for the patient to meet his/her long term goals):   No skilled physical therapy    Other factors to consider for discharge: Motivated/A & O x 4/Supportive Family/Independent PLOF     IF patient discharges home will need the following DME: none       SUBJECTIVE:   Patient stated “I am ready to go home.”    OBJECTIVE DATA SUMMARY:     Past Medical History:   Diagnosis Date    HLD (hyperlipidemia)     HTN (hypertension)     Rheumatoid arthritis (HCC)      Past Surgical History:   Procedure Laterality Date    APPENDECTOMY      BREAST BIOPSY Left     Surgical BX (Over 20yrs ago) - BENIGN     COLONOSCOPY N/A 1/25/2023    COLONOSCOPY performed by Olman Villegas MD at [facility] ENDOSCOPY    COLONOSCOPY N/A 2/13/2024    COLONOSCOPY 
Pharmacist Note - Vancomycin Dosing    Consult provided for this 74 y.o. female for indication of sepsis of unknown etiology.  Antibiotic regimen(s): vancomycin + Zosyn  Patient on vancomycin PTA? NO   Pregnancy status: N/A    Recent Labs     25  0843   WBC 12.1*   CREATININE 0.68   BUN 14     Frequency of BMP: q4hr through   Height: 161.3 cm  Weight: 90 kg  Est CrCl: 78 ml/min; UO: -- ml/kg/hr  Temp (24hrs), Av.7 °F (36.5 °C), Min:97.7 °F (36.5 °C), Max:97.7 °F (36.5 °C)    Cultures:  : Blood, percutaneous x 2 - sent, in process    MRSA Swab ordered (if applicable)? NO    The plan below is expected to result in a target range of AUC/-600    Therapy will be initiated with a loading dose of 2250 mg IV x 1 to be followed by a maintenance dose of 1000 mg IV every 12 hours.  Pharmacy to follow patient daily and order levels / make dose adjustments as appropriate.    Olman Pereyra, PharmD  Clinical Pharmacist    *Vancomycin has been dosed used Bayesian kinetics software to target an AUC/JEN of 400-600, which provides adequate exposure for an assumed infection due to MRSA with an JEN of 1 or less while reducing the risk of nephrotoxicity as seen with traditional trough based dosing goals.    
Physical Therapy  07/02/25    Order acknowledged, chart reviewed. Patient admitted with LBP, found to have an severe endplate L1 compression fx with retropositioning, pending kyphoplasty. Will follow up post procedure as abele/appropriate.     Thank you,   Saran Carvajal, PT     
Renal Dosing/Monitoring  Medication: Ketorolac   Current regimen:  30 every Q6 hr PRN  Recent Labs     07/02/25  1006 07/03/25  0519 07/03/25  0520   CREATININE 0.63 0.64 0.58   BUN 9 10 10     Body mass index is 35.25 kg/m².  Estimated CrCl:  91 ml/min    Plan: Change to 15 mg IV Q 6 hours PRN with respect age >64 yo per [facility]/[employer]-approved Renal Dosing Adjustment protocol.  Pharmacy will continue to monitor this patient daily for changes in clinical status and renal function.      Jesus Kenney, PharmD  Clinical Pharmacist, Orthopedics and Med/Surg  [facility]    
Spiritual Health History and Assessment/Progress Note  [facility]    Rituals, Rites and Sacraments,  ,  ,      Name: Flores Carlisle MRN: [id number]    Age: 74 y.o.     Sex: female   Language: English   Latter-day: Faith   Hyponatremia     Date: 7/2/2025            Total Time Calculated: 5 min              Spiritual Assessment began in [facility] 6S NEURO-SCI TELE        Referral/Consult From: Clergy/   Encounter Overview/Reason: Rituals, Rites and Sacraments  Service Provided For: Patient    Bonnie, Belief, Meaning:   Patient is connected with a bonnie tradition or spiritual practice  Family/Friends No family/friends present      Importance and Influence:  Patient has spiritual/personal beliefs that influence decisions regarding their health  Family/Friends No family/friends present    Community:  Patient is connected with a spiritual community  Family/Friends No family/friends present    Assessment and Plan of Care:     Patient Interventions include: Provided sacramental/Rastafarian ritual  Family/Friends Interventions include: No family/friends present    Patient Plan of Care: Spiritual Care available upon further referral  Family/Friends Plan of Care: Spiritual Care available upon further referral    Electronically signed by Chaplain CHRISTA on 7/2/2025 at 1:40 PM     [facility]  Mrs. Carlisle is Faith. She +declined communion today because she wasn't prepared.  Prayer offered and assured her of continued prayer for her well-being.     Sr. RYAN Cook, RN, ACSW, LCSW   Page:  [phone]  
(ZOFRAN) injection 4 mg  4 mg IntraVENous Q6H PRN Gerri Foster MD        polyethylene glycol (GLYCOLAX) packet 17 g  17 g Oral Daily PRN Gerri Foster MD        acetaminophen (TYLENOL) tablet 650 mg  650 mg Oral Q6H PRN Gerri Foster MD   650 mg at 07/03/25 1546    Or    acetaminophen (TYLENOL) suppository 650 mg  650 mg Rectal Q6H PRN Gerri Foster MD        sodium chloride flush 0.9 % injection 5-40 mL  5-40 mL IntraVENous 2 times per day Gerri Foster MD   10 mL at 07/04/25 0824    sodium chloride flush 0.9 % injection 5-40 mL  5-40 mL IntraVENous PRN Gerri Foster MD        0.9 % sodium chloride infusion   IntraVENous PRN Gerri Foster MD        traMADol (ULTRAM) tablet 50 mg  50 mg Oral Q6H PRN Gerri Foster MD   50 mg at 07/02/25 1731     Current Outpatient Medications   Medication Sig Dispense Refill    [START ON 7/10/2025] Ergocalciferol (VITAMIN D) 99529 units CAPS Take 50,000 Units by mouth once a week for 6 doses 5 capsule 0    denosumab (PROLIA) 60 MG/ML SOSY SC injection Inject 1 mL into the skin every 6 months Not sure of dose      METHOTREXATE, ANTI-RHEUMATIC, PO Take 4 tablets by mouth once a week      leucovorin calcium (WELLCOVORIN) 5 MG tablet Take 1 tablet by mouth daily      telmisartan (MICARDIS) 80 MG tablet Take 1 tablet by mouth daily      Tocilizumab (ACTEMRA IV) Infuse intravenously every 30 days          Labs/Data:    Lab Results   Component Value Date    WBC 9.2 07/04/2025    HGB 12.3 07/04/2025    HCT 38.3 07/04/2025    MCV 95.0 07/04/2025     07/04/2025       Lab Results   Component Value Date/Time     07/04/2025 06:21 AM    K 4.4 07/04/2025 06:21 AM    CL 99 07/04/2025 06:21 AM    CO2 25 07/04/2025 06:21 AM    BUN 11 07/04/2025 06:21 AM    CREATININE 0.49 07/04/2025 06:21 AM    GLUCOSE 103 07/04/2025 06:21 AM    CALCIUM 8.3 07/04/2025 06:21 AM    LABGLOM >90 07/04/2025 06:21 AM        Wt Readings from Last 3 Encounters:   07/04/25 90.4 kg (199 lb

## 2025-07-04 NOTE — PLAN OF CARE
Problem: Discharge Planning  Goal: Discharge to home or other facility with appropriate resources  7/4/2025 0028 by Rosalva Goodwin RN  Outcome: Progressing  Flowsheets (Taken 7/3/2025 2000)  Discharge to home or other facility with appropriate resources: Identify barriers to discharge with patient and caregiver  7/3/2025 1118 by Pat Echevarria RN  Outcome: Progressing  Flowsheets (Taken 7/3/2025 0800)  Discharge to home or other facility with appropriate resources: Identify barriers to discharge with patient and caregiver     Problem: Pain  Goal: Verbalizes/displays adequate comfort level or baseline comfort level  7/4/2025 0028 by Rsoalva Goodwin RN  Outcome: Progressing  7/3/2025 1118 by Pat Echevarria RN  Outcome: Progressing     Problem: Safety - Adult  Goal: Free from fall injury  7/4/2025 0028 by Rosalva Goodwin RN  Outcome: Progressing  Flowsheets (Taken 7/3/2025 2000)  Free From Fall Injury: Instruct family/caregiver on patient safety  7/3/2025 1118 by Pat Echevarria RN  Outcome: Progressing  Flowsheets (Taken 7/3/2025 0800)  Free From Fall Injury:   Instruct family/caregiver on patient safety   Based on caregiver fall risk screen, instruct family/caregiver to ask for assistance with transferring infant if caregiver noted to have fall risk factors     Problem: Chronic Conditions and Co-morbidities  Goal: Patient's chronic conditions and co-morbidity symptoms are monitored and maintained or improved  7/4/2025 0028 by Rosalva Goodwin RN  Outcome: Progressing  Flowsheets (Taken 7/3/2025 2000)  Care Plan - Patient's Chronic Conditions and Co-Morbidity Symptoms are Monitored and Maintained or Improved: Monitor and assess patient's chronic conditions and comorbid symptoms for stability, deterioration, or improvement  7/3/2025 1118 by Pat Echevarria RN  Outcome: Progressing  Flowsheets (Taken 7/3/2025 0800)  Care Plan - Patient's Chronic Conditions and Co-Morbidity Symptoms are Monitored and

## 2025-07-04 NOTE — DISCHARGE INSTRUCTIONS
Low sodium: avoid non-steroidal anti-inflammatory drugs like ibuprofen.Tylenol is OK to take. Adhere to a fluid restriction of 1,500 mL's of total fluid daily.     Sodium level re-check in 1 week.

## 2025-07-04 NOTE — DISCHARGE SUMMARY
METHOTREXATE (ANTI-RHEUMATIC) PO     Prolia 60 MG/ML Sosy SC injection  Generic drug: denosumab     telmisartan 80 MG tablet  Commonly known as: MICARDIS            STOP taking these medications      ibuprofen 600 MG tablet  Commonly known as: ADVIL;MOTRIN               Where to Get Your Medications        These medications were sent to [id number] [place], [place] - [address] [place] [address] [phone] - F [phone] [address] [facility] [address]      Phone: [phone]   Vitamin D (Ergocalciferol) 98883 units Caps             PHYSICAL EXAMINATION AT DISCHARGE:    S: feels better, pain better, wants to go home    /88   Pulse (!) 104   Temp 97.9 °F (36.6 °C) (Oral)   Resp 23   Wt 90.4 kg (199 lb 4.8 oz)   SpO2 94%   BMI 35.30 kg/m²     NAD  RRR systolic murmur  Extr 1+ edema b/l LE    CHRONIC MEDICAL DIAGNOSES:  Principal Problem:    Hyponatremia  Resolved Problems:    * No resolved hospital problems. *      Greater than 30 minutes spent on discharge management.    Signed:   Victor Hugo Perez MD  7/4/2025  10:03 AM

## 2025-07-04 NOTE — PLAN OF CARE
Problem: Discharge Planning  Goal: Discharge to home or other facility with appropriate resources  7/4/2025 1025 by Nemo De La Rosa RN  Outcome: Completed  7/4/2025 0911 by Nemo De La Rosa RN  Outcome: Progressing  Flowsheets (Taken 7/4/2025 0800)  Discharge to home or other facility with appropriate resources:   Identify barriers to discharge with patient and caregiver   Arrange for needed discharge resources and transportation as appropriate   Identify discharge learning needs (meds, wound care, etc)   Refer to discharge planning if patient needs post-hospital services based on physician order or complex needs related to functional status, cognitive ability or social support system  7/4/2025 0028 by Rosalva Goodwin RN  Outcome: Progressing  Flowsheets (Taken 7/3/2025 2000)  Discharge to home or other facility with appropriate resources: Identify barriers to discharge with patient and caregiver     Problem: Pain  Goal: Verbalizes/displays adequate comfort level or baseline comfort level  7/4/2025 1025 by Nemo De La Rosa RN  Outcome: Completed  7/4/2025 0911 by Nemo De La Rosa RN  Outcome: Progressing  7/4/2025 0028 by Rosalva Goodwin RN  Outcome: Progressing     Problem: Safety - Adult  Goal: Free from fall injury  7/4/2025 1025 by Nemo De La Rosa RN  Outcome: Completed  7/4/2025 0911 by Nemo De La Rosa RN  Outcome: Progressing  7/4/2025 0028 by Rosalva Goodwin RN  Outcome: Progressing  Flowsheets (Taken 7/3/2025 2000)  Free From Fall Injury: Instruct family/caregiver on patient safety     Problem: Chronic Conditions and Co-morbidities  Goal: Patient's chronic conditions and co-morbidity symptoms are monitored and maintained or improved  7/4/2025 1025 by Nemo De La Rosa RN  Outcome: Completed  7/4/2025 0911 by Nemo De La Rosa RN  Outcome: Progressing  Flowsheets (Taken 7/4/2025 0800)  Care Plan - Patient's Chronic Conditions and Co-Morbidity Symptoms are Monitored

## 2025-07-04 NOTE — PLAN OF CARE
Problem: Discharge Planning  Goal: Discharge to home or other facility with appropriate resources  7/4/2025 0911 by Nemo De La Rosa RN  Outcome: Progressing  Flowsheets (Taken 7/4/2025 0800)  Discharge to home or other facility with appropriate resources:   Identify barriers to discharge with patient and caregiver   Arrange for needed discharge resources and transportation as appropriate   Identify discharge learning needs (meds, wound care, etc)   Refer to discharge planning if patient needs post-hospital services based on physician order or complex needs related to functional status, cognitive ability or social support system  7/4/2025 0028 by Rosalva Goodwin RN  Outcome: Progressing  Flowsheets (Taken 7/3/2025 2000)  Discharge to home or other facility with appropriate resources: Identify barriers to discharge with patient and caregiver     Problem: Pain  Goal: Verbalizes/displays adequate comfort level or baseline comfort level  7/4/2025 0911 by Nemo De La Rosa RN  Outcome: Progressing  7/4/2025 0028 by Rosalva Goodwin RN  Outcome: Progressing     Problem: Safety - Adult  Goal: Free from fall injury  7/4/2025 0911 by Nemo De La Rosa RN  Outcome: Progressing  7/4/2025 0028 by Rosalva Goodwin RN  Outcome: Progressing  Flowsheets (Taken 7/3/2025 2000)  Free From Fall Injury: Instruct family/caregiver on patient safety     Problem: Chronic Conditions and Co-morbidities  Goal: Patient's chronic conditions and co-morbidity symptoms are monitored and maintained or improved  7/4/2025 0911 by Nemo De La Rosa RN  Outcome: Progressing  Flowsheets (Taken 7/4/2025 0800)  Care Plan - Patient's Chronic Conditions and Co-Morbidity Symptoms are Monitored and Maintained or Improved:   Monitor and assess patient's chronic conditions and comorbid symptoms for stability, deterioration, or improvement   Collaborate with multidisciplinary team to address chronic and comorbid conditions and prevent

## 2025-07-06 LAB
BACTERIA SPEC CULT: NORMAL
BACTERIA SPEC CULT: NORMAL
SERVICE CMNT-IMP: NORMAL
SERVICE CMNT-IMP: NORMAL

## 2025-07-07 ENCOUNTER — TELEMEDICINE (OUTPATIENT)
Age: 75
End: 2025-07-07
Payer: MEDICARE

## 2025-07-07 DIAGNOSIS — Z09 HOSPITAL DISCHARGE FOLLOW-UP: Primary | ICD-10-CM

## 2025-07-07 PROCEDURE — G8417 CALC BMI ABV UP PARAM F/U: HCPCS | Performed by: NURSE PRACTITIONER

## 2025-07-07 PROCEDURE — G8400 PT W/DXA NO RESULTS DOC: HCPCS | Performed by: NURSE PRACTITIONER

## 2025-07-07 PROCEDURE — 3017F COLORECTAL CA SCREEN DOC REV: CPT | Performed by: NURSE PRACTITIONER

## 2025-07-07 PROCEDURE — 1159F MED LIST DOCD IN RCRD: CPT | Performed by: NURSE PRACTITIONER

## 2025-07-07 PROCEDURE — 99213 OFFICE O/P EST LOW 20 MIN: CPT | Performed by: NURSE PRACTITIONER

## 2025-07-07 PROCEDURE — 1036F TOBACCO NON-USER: CPT | Performed by: NURSE PRACTITIONER

## 2025-07-07 PROCEDURE — G8427 DOCREV CUR MEDS BY ELIG CLIN: HCPCS | Performed by: NURSE PRACTITIONER

## 2025-07-07 PROCEDURE — 1111F DSCHRG MED/CURRENT MED MERGE: CPT | Performed by: NURSE PRACTITIONER

## 2025-07-07 PROCEDURE — 1090F PRES/ABSN URINE INCON ASSESS: CPT | Performed by: NURSE PRACTITIONER

## 2025-07-07 PROCEDURE — 1123F ACP DISCUSS/DSCN MKR DOCD: CPT | Performed by: NURSE PRACTITIONER

## 2025-07-07 SDOH — ECONOMIC STABILITY: FOOD INSECURITY: WITHIN THE PAST 12 MONTHS, THE FOOD YOU BOUGHT JUST DIDN'T LAST AND YOU DIDN'T HAVE MONEY TO GET MORE.: NEVER TRUE

## 2025-07-07 SDOH — ECONOMIC STABILITY: TRANSPORTATION INSECURITY
IN THE PAST 12 MONTHS, HAS THE LACK OF TRANSPORTATION KEPT YOU FROM MEDICAL APPOINTMENTS OR FROM GETTING MEDICATIONS?: NO

## 2025-07-07 SDOH — ECONOMIC STABILITY: FOOD INSECURITY: WITHIN THE PAST 12 MONTHS, YOU WORRIED THAT YOUR FOOD WOULD RUN OUT BEFORE YOU GOT MONEY TO BUY MORE.: NEVER TRUE

## 2025-07-07 SDOH — ECONOMIC STABILITY: INCOME INSECURITY: IN THE LAST 12 MONTHS, WAS THERE A TIME WHEN YOU WERE NOT ABLE TO PAY THE MORTGAGE OR RENT ON TIME?: NO

## 2025-07-07 SDOH — ECONOMIC STABILITY: TRANSPORTATION INSECURITY
IN THE PAST 12 MONTHS, HAS LACK OF TRANSPORTATION KEPT YOU FROM MEETINGS, WORK, OR FROM GETTING THINGS NEEDED FOR DAILY LIVING?: NO

## 2025-07-07 ASSESSMENT — ENCOUNTER SYMPTOMS: BACK PAIN: 1

## 2025-07-07 NOTE — PROGRESS NOTES
Post-Discharge Transitional Care Follow Up      Flores Carlisle   YOB: 1950    Date of Office Visit:  7/7/2025  Date of Hospital Admission: 7/1/25  Date of Hospital Discharge: 7/4/25  Readmission Risk Score (high >=14%. Medium >=10%):Readmission Risk Score: 9.7      Care management risk score Rising risk (score 2-5) and Complex Care (Scores >=6): No Risk Score On File     Non face to face  following discharge, date last encounter closed (first attempt may have been earlier): 07/07/2025     Call initiated 2 business days of discharge: Yes         Medical Decision Making: straightforward    On this date 7/7/2025 I have spent 25 minutes reviewing previous notes, test results and face to face with the patient discussing the diagnosis and importance of compliance with the treatment plan as well as documenting on the day of the visit.       Subjective:   Patient was admitted to [facility] 7/-7/4 for back pain and hyponatremia. She had kryoplasty done on 7/3, hyponatremia was improving so was discharged to home  She reports the back pain is not improved and actually feels that there is \"something more wrong\"  Her pain has not been managed with tramadol and tylenol and was taking a ibuprofen at night even though was told to avoid, but it was the only thing that would help.   She feels she is not able to tolerate the pain at home and would like another MRI   She was not given a follow up with anyone regarding her back pain   Denies any new weakness, tingling or numbness. Denies incontinence        Patient feels she needs more immediate pain control and imaging of her back . She will go to [facility] evaluation.       Inpatient course: Discharge summary reviewed- see chart.    Interval history/Current status:     Patient Active Problem List   Diagnosis    Respiratory failure (HCC)    Hyponatremia       Medications listed as ordered at the time of discharge from hospital     Medication List

## 2025-07-08 ENCOUNTER — APPOINTMENT (OUTPATIENT)
Facility: HOSPITAL | Age: 75
DRG: 516 | End: 2025-07-08
Payer: MEDICARE

## 2025-07-08 ENCOUNTER — HOSPITAL ENCOUNTER (INPATIENT)
Facility: HOSPITAL | Age: 75
LOS: 1 days | Discharge: HOME HEALTH | DRG: 516 | End: 2025-07-10
Attending: EMERGENCY MEDICINE | Admitting: HOSPITALIST
Payer: MEDICARE

## 2025-07-08 DIAGNOSIS — M54.50 ACUTE MIDLINE LOW BACK PAIN WITHOUT SCIATICA: Primary | ICD-10-CM

## 2025-07-08 PROBLEM — S32.009G: Status: ACTIVE | Noted: 2025-07-08

## 2025-07-08 LAB
ALBUMIN SERPL-MCNC: 2.8 G/DL (ref 3.5–5)
ALBUMIN/GLOB SERPL: 0.7 (ref 1.1–2.2)
ALP SERPL-CCNC: 114 U/L (ref 45–117)
ALT SERPL-CCNC: 18 U/L (ref 12–78)
ANION GAP SERPL CALC-SCNC: 7 MMOL/L (ref 2–12)
APPEARANCE UR: CLEAR
AST SERPL-CCNC: 13 U/L (ref 15–37)
BACTERIA URNS QL MICRO: NEGATIVE /HPF
BASOPHILS # BLD: 0.07 K/UL (ref 0–0.1)
BASOPHILS NFR BLD: 0.9 % (ref 0–1)
BILIRUB SERPL-MCNC: 0.5 MG/DL (ref 0.2–1)
BILIRUB UR QL: NEGATIVE
BUN SERPL-MCNC: 17 MG/DL (ref 6–20)
BUN/CREAT SERPL: 25 (ref 12–20)
CALCIUM SERPL-MCNC: 8.9 MG/DL (ref 8.5–10.1)
CHLORIDE SERPL-SCNC: 96 MMOL/L (ref 97–108)
CO2 SERPL-SCNC: 28 MMOL/L (ref 21–32)
COLOR UR: ABNORMAL
COMMENT:: NORMAL
CREAT SERPL-MCNC: 0.67 MG/DL (ref 0.55–1.02)
DIFFERENTIAL METHOD BLD: ABNORMAL
EOSINOPHIL # BLD: 0.11 K/UL (ref 0–0.4)
EOSINOPHIL NFR BLD: 1.3 % (ref 0–7)
EPITH CASTS URNS QL MICRO: ABNORMAL /LPF
ERYTHROCYTE [DISTWIDTH] IN BLOOD BY AUTOMATED COUNT: 13.7 % (ref 11.5–14.5)
GLOBULIN SER CALC-MCNC: 3.8 G/DL (ref 2–4)
GLUCOSE SERPL-MCNC: 101 MG/DL (ref 65–100)
GLUCOSE UR STRIP.AUTO-MCNC: NEGATIVE MG/DL
HCT VFR BLD AUTO: 37.2 % (ref 35–47)
HGB BLD-MCNC: 11.9 G/DL (ref 11.5–16)
HGB UR QL STRIP: NEGATIVE
HYALINE CASTS URNS QL MICRO: ABNORMAL /LPF (ref 0–5)
IMM GRANULOCYTES # BLD AUTO: 0.03 K/UL (ref 0–0.04)
IMM GRANULOCYTES NFR BLD AUTO: 0.4 % (ref 0–0.5)
KETONES UR QL STRIP.AUTO: NEGATIVE MG/DL
LEUKOCYTE ESTERASE UR QL STRIP.AUTO: ABNORMAL
LYMPHOCYTES # BLD: 1.36 K/UL (ref 0.8–3.5)
LYMPHOCYTES NFR BLD: 16.6 % (ref 12–49)
MAGNESIUM SERPL-MCNC: 2 MG/DL (ref 1.6–2.4)
MCH RBC QN AUTO: 30.8 PG (ref 26–34)
MCHC RBC AUTO-ENTMCNC: 32 G/DL (ref 30–36.5)
MCV RBC AUTO: 96.4 FL (ref 80–99)
MONOCYTES # BLD: 0.75 K/UL (ref 0–1)
MONOCYTES NFR BLD: 9.1 % (ref 5–13)
NEUTS SEG # BLD: 5.89 K/UL (ref 1.8–8)
NEUTS SEG NFR BLD: 71.7 % (ref 32–75)
NITRITE UR QL STRIP.AUTO: NEGATIVE
NRBC # BLD: 0 K/UL (ref 0–0.01)
NRBC BLD-RTO: 0 PER 100 WBC
PH UR STRIP: 7.5 (ref 5–8)
PLATELET # BLD AUTO: 430 K/UL (ref 150–400)
PMV BLD AUTO: 8.8 FL (ref 8.9–12.9)
POTASSIUM SERPL-SCNC: 4.6 MMOL/L (ref 3.5–5.1)
PROT SERPL-MCNC: 6.6 G/DL (ref 6.4–8.2)
PROT UR STRIP-MCNC: NEGATIVE MG/DL
RBC # BLD AUTO: 3.86 M/UL (ref 3.8–5.2)
RBC #/AREA URNS HPF: ABNORMAL /HPF (ref 0–5)
SODIUM SERPL-SCNC: 131 MMOL/L (ref 136–145)
SP GR UR REFRACTOMETRY: 1.01 (ref 1–1.03)
SPECIMEN HOLD: NORMAL
URINE CULTURE IF INDICATED: ABNORMAL
UROBILINOGEN UR QL STRIP.AUTO: 1 EU/DL (ref 0.2–1)
WBC # BLD AUTO: 8.2 K/UL (ref 3.6–11)
WBC URNS QL MICRO: ABNORMAL /HPF (ref 0–4)

## 2025-07-08 PROCEDURE — 84439 ASSAY OF FREE THYROXINE: CPT

## 2025-07-08 PROCEDURE — 6360000002 HC RX W HCPCS: Performed by: EMERGENCY MEDICINE

## 2025-07-08 PROCEDURE — G0378 HOSPITAL OBSERVATION PER HR: HCPCS

## 2025-07-08 PROCEDURE — 72146 MRI CHEST SPINE W/O DYE: CPT

## 2025-07-08 PROCEDURE — 84443 ASSAY THYROID STIM HORMONE: CPT

## 2025-07-08 PROCEDURE — 96374 THER/PROPH/DIAG INJ IV PUSH: CPT

## 2025-07-08 PROCEDURE — 6370000000 HC RX 637 (ALT 250 FOR IP): Performed by: NURSE PRACTITIONER

## 2025-07-08 PROCEDURE — 83735 ASSAY OF MAGNESIUM: CPT

## 2025-07-08 PROCEDURE — 72148 MRI LUMBAR SPINE W/O DYE: CPT

## 2025-07-08 PROCEDURE — 72131 CT LUMBAR SPINE W/O DYE: CPT

## 2025-07-08 PROCEDURE — 81001 URINALYSIS AUTO W/SCOPE: CPT

## 2025-07-08 PROCEDURE — 80053 COMPREHEN METABOLIC PANEL: CPT

## 2025-07-08 PROCEDURE — 99285 EMERGENCY DEPT VISIT HI MDM: CPT

## 2025-07-08 PROCEDURE — 85025 COMPLETE CBC W/AUTO DIFF WBC: CPT

## 2025-07-08 RX ORDER — OXYCODONE HYDROCHLORIDE 5 MG/1
5 TABLET ORAL EVERY 4 HOURS PRN
Refills: 0 | Status: DISCONTINUED | OUTPATIENT
Start: 2025-07-08 | End: 2025-07-10 | Stop reason: HOSPADM

## 2025-07-08 RX ORDER — DEXAMETHASONE SODIUM PHOSPHATE 10 MG/ML
10 INJECTION, SOLUTION INTRAMUSCULAR; INTRAVENOUS ONCE
Status: COMPLETED | OUTPATIENT
Start: 2025-07-08 | End: 2025-07-08

## 2025-07-08 RX ORDER — ACETAMINOPHEN 650 MG/1
650 SUPPOSITORY RECTAL EVERY 6 HOURS PRN
Status: DISCONTINUED | OUTPATIENT
Start: 2025-07-08 | End: 2025-07-10 | Stop reason: HOSPADM

## 2025-07-08 RX ORDER — AMLODIPINE BESYLATE 5 MG/1
5 TABLET ORAL DAILY
Status: DISCONTINUED | OUTPATIENT
Start: 2025-07-08 | End: 2025-07-10 | Stop reason: HOSPADM

## 2025-07-08 RX ORDER — ACETAMINOPHEN 325 MG/1
650 TABLET ORAL EVERY 6 HOURS PRN
Status: DISCONTINUED | OUTPATIENT
Start: 2025-07-08 | End: 2025-07-10 | Stop reason: HOSPADM

## 2025-07-08 RX ORDER — MAGNESIUM SULFATE IN WATER 40 MG/ML
2000 INJECTION, SOLUTION INTRAVENOUS PRN
Status: DISCONTINUED | OUTPATIENT
Start: 2025-07-08 | End: 2025-07-10 | Stop reason: HOSPADM

## 2025-07-08 RX ORDER — POTASSIUM CHLORIDE 750 MG/1
40 TABLET, EXTENDED RELEASE ORAL PRN
Status: DISCONTINUED | OUTPATIENT
Start: 2025-07-08 | End: 2025-07-10 | Stop reason: HOSPADM

## 2025-07-08 RX ORDER — POTASSIUM CHLORIDE 7.45 MG/ML
10 INJECTION INTRAVENOUS PRN
Status: DISCONTINUED | OUTPATIENT
Start: 2025-07-08 | End: 2025-07-10 | Stop reason: HOSPADM

## 2025-07-08 RX ORDER — POLYETHYLENE GLYCOL 3350 17 G/17G
17 POWDER, FOR SOLUTION ORAL DAILY PRN
Status: DISCONTINUED | OUTPATIENT
Start: 2025-07-08 | End: 2025-07-10

## 2025-07-08 RX ORDER — SODIUM CHLORIDE 0.9 % (FLUSH) 0.9 %
5-40 SYRINGE (ML) INJECTION EVERY 12 HOURS SCHEDULED
Status: DISCONTINUED | OUTPATIENT
Start: 2025-07-08 | End: 2025-07-10 | Stop reason: HOSPADM

## 2025-07-08 RX ORDER — SODIUM CHLORIDE 9 MG/ML
INJECTION, SOLUTION INTRAVENOUS PRN
Status: DISCONTINUED | OUTPATIENT
Start: 2025-07-08 | End: 2025-07-10 | Stop reason: HOSPADM

## 2025-07-08 RX ORDER — ONDANSETRON 4 MG/1
4 TABLET, ORALLY DISINTEGRATING ORAL EVERY 8 HOURS PRN
Status: DISCONTINUED | OUTPATIENT
Start: 2025-07-08 | End: 2025-07-10 | Stop reason: HOSPADM

## 2025-07-08 RX ORDER — LOSARTAN POTASSIUM 50 MG/1
100 TABLET ORAL DAILY
Status: DISCONTINUED | OUTPATIENT
Start: 2025-07-08 | End: 2025-07-08

## 2025-07-08 RX ORDER — ENOXAPARIN SODIUM 100 MG/ML
40 INJECTION SUBCUTANEOUS DAILY
Status: DISCONTINUED | OUTPATIENT
Start: 2025-07-08 | End: 2025-07-10 | Stop reason: HOSPADM

## 2025-07-08 RX ORDER — CYCLOBENZAPRINE HCL 10 MG
10 TABLET ORAL 3 TIMES DAILY PRN
Status: DISCONTINUED | OUTPATIENT
Start: 2025-07-08 | End: 2025-07-10 | Stop reason: HOSPADM

## 2025-07-08 RX ORDER — ONDANSETRON 2 MG/ML
4 INJECTION INTRAMUSCULAR; INTRAVENOUS EVERY 6 HOURS PRN
Status: DISCONTINUED | OUTPATIENT
Start: 2025-07-08 | End: 2025-07-10 | Stop reason: HOSPADM

## 2025-07-08 RX ORDER — SODIUM CHLORIDE 0.9 % (FLUSH) 0.9 %
5-40 SYRINGE (ML) INJECTION PRN
Status: DISCONTINUED | OUTPATIENT
Start: 2025-07-08 | End: 2025-07-10 | Stop reason: HOSPADM

## 2025-07-08 RX ADMIN — AMLODIPINE BESYLATE 5 MG: 5 TABLET ORAL at 16:06

## 2025-07-08 RX ADMIN — OXYCODONE 5 MG: 5 TABLET ORAL at 20:02

## 2025-07-08 RX ADMIN — OXYCODONE 5 MG: 5 TABLET ORAL at 15:35

## 2025-07-08 RX ADMIN — CYCLOBENZAPRINE 10 MG: 10 TABLET, FILM COATED ORAL at 15:35

## 2025-07-08 RX ADMIN — DEXAMETHASONE SODIUM PHOSPHATE 10 MG: 10 INJECTION INTRAMUSCULAR; INTRAVENOUS at 11:46

## 2025-07-08 RX ADMIN — CYCLOBENZAPRINE 10 MG: 10 TABLET, FILM COATED ORAL at 23:25

## 2025-07-08 ASSESSMENT — ENCOUNTER SYMPTOMS
ABDOMINAL PAIN: 0
DIARRHEA: 0
NAUSEA: 0
BACK PAIN: 1
SHORTNESS OF BREATH: 0
CONSTIPATION: 0
COLOR CHANGE: 0
VOMITING: 0

## 2025-07-08 ASSESSMENT — PAIN DESCRIPTION - LOCATION
LOCATION: BACK

## 2025-07-08 ASSESSMENT — PAIN DESCRIPTION - FREQUENCY
FREQUENCY: CONTINUOUS
FREQUENCY: CONTINUOUS

## 2025-07-08 ASSESSMENT — PAIN DESCRIPTION - ONSET
ONSET: ON-GOING
ONSET: ON-GOING

## 2025-07-08 ASSESSMENT — PAIN SCALES - GENERAL
PAINLEVEL_OUTOF10: 7
PAINLEVEL_OUTOF10: 4

## 2025-07-08 ASSESSMENT — PAIN DESCRIPTION - DESCRIPTORS
DESCRIPTORS: ACHING
DESCRIPTORS: DULL;ACHING

## 2025-07-08 ASSESSMENT — PAIN - FUNCTIONAL ASSESSMENT
PAIN_FUNCTIONAL_ASSESSMENT: 0-10
PAIN_FUNCTIONAL_ASSESSMENT: PREVENTS OR INTERFERES WITH ALL ACTIVE AND SOME PASSIVE ACTIVITIES

## 2025-07-08 ASSESSMENT — PAIN DESCRIPTION - PAIN TYPE
TYPE: ACUTE PAIN
TYPE: ACUTE PAIN

## 2025-07-08 ASSESSMENT — LIFESTYLE VARIABLES
HOW OFTEN DO YOU HAVE A DRINK CONTAINING ALCOHOL: MONTHLY OR LESS
HOW MANY STANDARD DRINKS CONTAINING ALCOHOL DO YOU HAVE ON A TYPICAL DAY: 1 OR 2

## 2025-07-08 ASSESSMENT — PAIN DESCRIPTION - ORIENTATION
ORIENTATION: POSTERIOR;MID
ORIENTATION: LOWER

## 2025-07-08 NOTE — PROGRESS NOTES
ED TO INPATIENT SBAR HANDOFF    Patient Name: Flores Carlisle   :  1950  74 y.o.   MRN:  [id number]  ED Room #:  ER01/01     Situation  Code Status: Full Code   Allergies: Iodine  Weight: No data found.    Arrived from: home    Chief Complaint:   Chief Complaint   Patient presents with    Back Pain       Hospital Problem/Diagnosis:  Principal Problem:    Closed fracture dislocation of lumbar spine with delayed healing, subsequent encounter  Resolved Problems:    * No resolved hospital problems. *      Mobility: limited bed mobility  and limited transfer mobility   ED Fall Risk: Presents to emergency department  because of falls (Syncope, seizure, or loss of consciousness): No, Age > 70: Yes, Altered Mental Status, Intoxication with alcohol or substance confusion (Disorientation, impaired judgment, poor safety awaremess, or inability to follow instructions): No, Impaired Mobility: Ambulates or transfers with assistive devices or assistance; Unable to ambulate or transer.: No, Nursing Judgement: Yes   Fell in ED or prior to admission: no   Restraints: no     Sitter: no   Family/Caregiver Present:     Neet to know social/safety information:   Background  History:   Past Medical History:   Diagnosis Date    HLD (hyperlipidemia)     HTN (hypertension)     Rheumatoid arthritis (HCC)        Assessment    Abnormal Assessment Findings: lower back surgery  Imaging:   CT LUMBAR SPINE WO CONTRAST   Final Result      1. Status post kyphoplasty at L1 and L4 with unchanged compression deformities   at those levels.      2. No new fractures identified.      3. Multilevel degenerative changes as described         Electronically signed by Dontrell Guzman      MRI LUMBAR SPINE WO CONTRAST    (Results Pending)   MRI THORACIC SPINE WO CONTRAST    (Results Pending)     Abnormal labs:   Abnormal Labs Reviewed   CBC WITH AUTO DIFFERENTIAL - Abnormal; Notable for the following components:       Result Value    Platelets 430 (*)

## 2025-07-08 NOTE — ED TRIAGE NOTES
Patient is coming in with complaints of back pain. Patient had surgery last week and was discharged on Friday and continues with back pain. Denies any new injury. Denies numbness and tingling down either leg. Patient has increased edema to bilateral legs.

## 2025-07-08 NOTE — ED PROVIDER NOTES
packs/day: 0.00     Types: Cigarettes     Quit date: 10/26/2016     Years since quittin.7    Smokeless tobacco: Never   Substance and Sexual Activity    Alcohol use: Yes    Drug use: No     Social Drivers of Health     Housing Stability: Unknown (2025)    Housing Stability Vital Sign     Homeless in the Last Year: No       SCREENINGS         [place] Coma Scale  Eye Opening: Spontaneous  Best Verbal Response: Oriented  Best Motor Response: Obeys commands  [place] Coma Scale Score: 15                     CIWA Assessment  BP: 112/76  Pulse: (!) 104                 PHYSICAL EXAM    (up to 7 for level 4, 8 or more for level 5)     ED Triage Vitals [25 0842]   BP Systolic BP Percentile Diastolic BP Percentile Temp Temp Source Pulse Respirations SpO2   112/76 -- -- 97.5 °F (36.4 °C) Oral (!) 104 20 95 %      Height Weight         1.626 m (5' 4\") --             Physical Exam  Vitals and nursing note reviewed.   Constitutional:       General: She is not in acute distress.     Appearance: Normal appearance. She is not ill-appearing, toxic-appearing or diaphoretic.   HENT:      Head: Normocephalic and atraumatic.      Nose: Nose normal.      Mouth/Throat:      Mouth: Mucous membranes are moist.   Eyes:      Extraocular Movements: Extraocular movements intact.      Conjunctiva/sclera: Conjunctivae normal.      Pupils: Pupils are equal, round, and reactive to light.   Cardiovascular:      Comments: Warm and well perfused  Pulmonary:      Effort: Pulmonary effort is normal.   Musculoskeletal:         General: Normal range of motion.      Cervical back: Normal range of motion.      Right lower leg: Edema present.      Left lower leg: Edema present.   Skin:     General: Skin is warm and dry.   Neurological:      General: No focal deficit present.      Mental Status: She is alert and oriented to person, place, and time.   Psychiatric:         Mood and Affect: Mood normal.         Behavior: Behavior normal.

## 2025-07-08 NOTE — CARE COORDINATION
07/08/25 1540   Readmission Assessment   Number of Days since last admission? 1-7 days   Previous Disposition Home with Family   Who is being Interviewed Patient   What was the patient's/caregiver's perception as to why they think they needed to return back to the hospital? Did not realize care needs would be so extensive;Did not agree to original recommended D/C plan  (Pt was unhappy there was not an appointment with surgeon after dc.)   Did you visit your Primary Care Physician after you left the hospital, before you returned this time? No  (Had tele health appointment with NP when ambulatory CM identified pt was having concerns and unexpected pain)   Did you see a specialist, such as Cardiac, Pulmonary, Orthopedic Physician, etc. after you left the hospital? No   Who advised the patient to return to the hospital? Physician's Nurse/Office staff   Does the patient report anything that got in the way of taking their medications? No   In our efforts to provide the best possible care to you and others like you, can you think of anything that we could have done to help you after you left the hospital the first time, so that you might not have needed to return so soon? Arrange for more help when leaving the hospital;Improved written discharge instructions

## 2025-07-08 NOTE — CARE COORDINATION
Pt is observation status and a readmission        07/08/25 5922   Service Assessment   Patient Orientation Alert and Oriented;Person;Place;Situation;Self   Cognition Alert   History Provided By Patient;Medical Record  (Pt confirmed all details from medical record review)   Primary Caregiver Self   Accompanied By/Relationship n/a   Support Systems Spouse/Significant Other   Patient's Healthcare Decision Maker is: Legal Next of Kin   PCP Verified by CM Yes   Last Visit to PCP Within last 6 months   Prior Functional Level Assistance with the following:;Cooking;Housework;Shopping  (Pt independent with toileting, ambulation on first floor to bathrooma nd kitchen, dressing,)   Current Functional Level Assistance with the following:;Cooking;Housework;Shopping   Can patient return to prior living arrangement Yes   Ability to make needs known: Good   Family able to assist with home care needs: Other (comment)  (TBD)   Would you like for me to discuss the discharge plan with any other family members/significant others, and if so, who? No   Financial Resources Medicare   Social/Functional History   Lives With Spouse   Type of Home House   Home Layout Two level  (Pt has not been using stairs to second floor to her bedroom)   Home Access Stairs to enter with rails   Entrance Stairs - Number of Steps 5   Bathroom Shower/Tub None   Bathroom Equipment None   Bathroom Accessibility Accessible   Home Equipment None   Receives Help From Family   Prior Level of Assist for ADLs Independent  (modified independent -  getting her clothes from the sewcond floor)   Prior Level of Assist for Homemaking Needs assistance   Homemaking Responsibilities No  (not at this time - with kyphoplasty recovery)   Ambulation Assistance Independent   Prior Level of Assist for Transfers Independent   Active  Yes   Mode of Transportation Car   Occupation Retired   Discharge Planning   Type of Residence House   Living Arrangements

## 2025-07-08 NOTE — H&P
History and Physical    Date of Service:  7/8/2025  Primary Care Provider: Sierra Chaudhary MD  Source of information: patient, electronic medical record    Chief Complaint: Back Pain      History of Presenting Illness:   Flores Carlisle is a 74 y.o. female with pmhx of HTN, HLD and RA who presents with continue pain in lower back.  Patient was admitted last week for L1, L5 fracture and had kyphoplasty on 7/3.  Patient reports that she had little or no relief from the procedure and returned to be re-evaluated.  Reports pain is in lower/middle back with no radiation down either leg.  Pain is managed if she does not move but once she starts moving it becomes very painful.  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  Denies fever chest pain shortness of breath, no n/v/d.  Workup in ED showed CT lumbar spine with no new fractures.  , K 4.6, BUN 17, Cr. 0.67 glucose 101, WBC 8.2, Hg 11.9, plt 430. UA negative with small amount of leuk esteraces.  Hospitalist consulted for admission.         REVIEW OF SYSTEMS:  A comprehensive review of systems was negative except for that written in the History of Present Illness.     Past Medical History:   Diagnosis Date    HLD (hyperlipidemia)     HTN (hypertension)     Rheumatoid arthritis (HCC)       Past Surgical History:   Procedure Laterality Date    APPENDECTOMY      BREAST BIOPSY Left     Surgical BX (Over 20yrs ago) - BENIGN     COLONOSCOPY N/A 1/25/2023    COLONOSCOPY performed by Olman Villegas MD at [facility] ENDOSCOPY    COLONOSCOPY N/A 2/13/2024    COLONOSCOPY DIAGNOSTIC performed by Adelita Zuleta MD at [facility] ENDOSCOPY    IR KYPHOPLASTY LUMBAR 1 VERTEBRAL BODY  7/3/2025    IR KYPHOPLASTY LUMBAR 1 VERTEBRAL BODY 7/3/2025 [facility] RAD ANGIO IR    OTHER SURGICAL HISTORY      Breast Biopsy     Prior to Admission medications    Medication Sig Start Date End Date Taking?

## 2025-07-09 ENCOUNTER — HOSPITAL ENCOUNTER (OUTPATIENT)
Facility: HOSPITAL | Age: 75
Setting detail: OBSERVATION
Discharge: HOME | DRG: 516 | End: 2025-07-12
Payer: MEDICARE

## 2025-07-09 VITALS
HEART RATE: 109 BPM | TEMPERATURE: 98.5 F | RESPIRATION RATE: 12 BRPM | SYSTOLIC BLOOD PRESSURE: 131 MMHG | OXYGEN SATURATION: 97 % | DIASTOLIC BLOOD PRESSURE: 82 MMHG

## 2025-07-09 PROBLEM — S22.070K: Status: ACTIVE | Noted: 2025-07-09

## 2025-07-09 LAB
ANION GAP SERPL CALC-SCNC: 5 MMOL/L (ref 2–12)
BUN SERPL-MCNC: 11 MG/DL (ref 6–20)
BUN/CREAT SERPL: 21 (ref 12–20)
CALCIUM SERPL-MCNC: 8.9 MG/DL (ref 8.5–10.1)
CHLORIDE SERPL-SCNC: 103 MMOL/L (ref 97–108)
CO2 SERPL-SCNC: 25 MMOL/L (ref 21–32)
CREAT SERPL-MCNC: 0.53 MG/DL (ref 0.55–1.02)
GLUCOSE SERPL-MCNC: 91 MG/DL (ref 65–100)
POTASSIUM SERPL-SCNC: 4.8 MMOL/L (ref 3.5–5.1)
SODIUM SERPL-SCNC: 133 MMOL/L (ref 136–145)

## 2025-07-09 PROCEDURE — 2500000003 HC RX 250 WO HCPCS: Performed by: NURSE PRACTITIONER

## 2025-07-09 PROCEDURE — 6360000002 HC RX W HCPCS: Performed by: STUDENT IN AN ORGANIZED HEALTH CARE EDUCATION/TRAINING PROGRAM

## 2025-07-09 PROCEDURE — 80048 BASIC METABOLIC PNL TOTAL CA: CPT

## 2025-07-09 PROCEDURE — 2500000003 HC RX 250 WO HCPCS: Performed by: STUDENT IN AN ORGANIZED HEALTH CARE EDUCATION/TRAINING PROGRAM

## 2025-07-09 PROCEDURE — 6370000000 HC RX 637 (ALT 250 FOR IP): Performed by: NURSE PRACTITIONER

## 2025-07-09 PROCEDURE — G0378 HOSPITAL OBSERVATION PER HR: HCPCS

## 2025-07-09 PROCEDURE — 1100000000 HC RM PRIVATE

## 2025-07-09 PROCEDURE — 97535 SELF CARE MNGMENT TRAINING: CPT

## 2025-07-09 PROCEDURE — 97165 OT EVAL LOW COMPLEX 30 MIN: CPT

## 2025-07-09 PROCEDURE — 77003 FLUOROGUIDE FOR SPINE INJECT: CPT

## 2025-07-09 PROCEDURE — 97530 THERAPEUTIC ACTIVITIES: CPT

## 2025-07-09 PROCEDURE — 97161 PT EVAL LOW COMPLEX 20 MIN: CPT

## 2025-07-09 PROCEDURE — 0PU43JZ SUPPLEMENT THORACIC VERTEBRA WITH SYNTHETIC SUBSTITUTE, PERCUTANEOUS APPROACH: ICD-10-PCS | Performed by: STUDENT IN AN ORGANIZED HEALTH CARE EDUCATION/TRAINING PROGRAM

## 2025-07-09 PROCEDURE — 0PS43ZZ REPOSITION THORACIC VERTEBRA, PERCUTANEOUS APPROACH: ICD-10-PCS | Performed by: STUDENT IN AN ORGANIZED HEALTH CARE EDUCATION/TRAINING PROGRAM

## 2025-07-09 RX ORDER — FENTANYL CITRATE 50 UG/ML
INJECTION, SOLUTION INTRAMUSCULAR; INTRAVENOUS PRN
Status: COMPLETED | OUTPATIENT
Start: 2025-07-09 | End: 2025-07-09

## 2025-07-09 RX ORDER — LIDOCAINE HYDROCHLORIDE 10 MG/ML
INJECTION, SOLUTION EPIDURAL; INFILTRATION; INTRACAUDAL; PERINEURAL PRN
Status: COMPLETED | OUTPATIENT
Start: 2025-07-09 | End: 2025-07-09

## 2025-07-09 RX ORDER — MIDAZOLAM HYDROCHLORIDE 1 MG/ML
INJECTION, SOLUTION INTRAMUSCULAR; INTRAVENOUS PRN
Status: COMPLETED | OUTPATIENT
Start: 2025-07-09 | End: 2025-07-09

## 2025-07-09 RX ADMIN — OXYCODONE 5 MG: 5 TABLET ORAL at 06:56

## 2025-07-09 RX ADMIN — FENTANYL CITRATE 50 MCG: 50 INJECTION INTRAMUSCULAR; INTRAVENOUS at 13:11

## 2025-07-09 RX ADMIN — SODIUM CHLORIDE, PRESERVATIVE FREE 10 ML: 5 INJECTION INTRAVENOUS at 08:03

## 2025-07-09 RX ADMIN — OXYCODONE 5 MG: 5 TABLET ORAL at 21:18

## 2025-07-09 RX ADMIN — OXYCODONE 5 MG: 5 TABLET ORAL at 15:03

## 2025-07-09 RX ADMIN — FENTANYL CITRATE 50 MCG: 50 INJECTION INTRAMUSCULAR; INTRAVENOUS at 13:01

## 2025-07-09 RX ADMIN — WATER 2000 MG: 1 INJECTION INTRAMUSCULAR; INTRAVENOUS; SUBCUTANEOUS at 12:10

## 2025-07-09 RX ADMIN — OXYCODONE 5 MG: 5 TABLET ORAL at 00:01

## 2025-07-09 RX ADMIN — MIDAZOLAM 1 MG: 1 INJECTION INTRAMUSCULAR; INTRAVENOUS at 13:01

## 2025-07-09 RX ADMIN — LIDOCAINE HYDROCHLORIDE 9 ML: 10 INJECTION, SOLUTION EPIDURAL; INFILTRATION; INTRACAUDAL; PERINEURAL at 13:04

## 2025-07-09 RX ADMIN — AMLODIPINE BESYLATE 5 MG: 5 TABLET ORAL at 08:02

## 2025-07-09 RX ADMIN — CYCLOBENZAPRINE 10 MG: 10 TABLET, FILM COATED ORAL at 21:18

## 2025-07-09 ASSESSMENT — PAIN SCALES - GENERAL
PAINLEVEL_OUTOF10: 4
PAINLEVEL_OUTOF10: 5
PAINLEVEL_OUTOF10: 4
PAINLEVEL_OUTOF10: 6

## 2025-07-09 ASSESSMENT — PAIN DESCRIPTION - LOCATION: LOCATION: BACK

## 2025-07-09 NOTE — PROGRESS NOTES
Hospitalist Progress Note  JILLIAN Cantu - NP  Answering service: [phone] OR [phone] from in house phone        Date of Service:  2025  NAME:  Flores Carlisle  :  1950  MRN:  [id number]      Admission Summary:     Flores Carlisle is a 74 y.o. female with pmhx of HTN, HLD and RA who presents with continue pain in lower back.  Patient was admitted last week for L1, L5 fracture and had kyphoplasty on 7/3.  Patient reports that she had little or no relief from the procedure and returned to be re-evaluated.  Reports pain is in lower/middle back with no radiation down either leg.  Pain is managed if she does not move but once she starts moving it becomes very painful.  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  Denies fever chest pain shortness of breath, no n/v/d.  Workup in ED showed CT lumbar spine with no new fractures.  , K 4.6, BUN 17, Cr. 0.67 glucose 101, WBC 8.2, Hg 11.9, plt 430. UA negative with small amount of leuk esteraces.  Hospitalist consulted for admission.          Interval history / Subjective:     Patient seen and examined s/p kyphoplasty. No acute complaints.      Assessment & Plan:         Lumbar back pain s/p kyphoplasty L1 and L 5  -CT of lumbar spine  1. Status post kyphoplasty at L1 and L4 with unchanged compression deformities  at those levels.   2. No new fractures identified.   3. Multilevel degenerative changes as described     -MRI of lumbar and thoracic spine shows again fracture at L1   -oxycodone and flexeril prn  -s/p kyphoplasty today with IR of T10  -regular diet, npo at midnight  -PT/OT eval for tomorrow      HTN  -will substitute amlodipine for telmisartan while inpatient  -BLE swelling, consider as a possible side effect of telmisartan, might consider dc on

## 2025-07-09 NOTE — PROGRESS NOTES
Attended Interdisciplinary Rounds on [facility] where patient's care was discussed.    Peace Breaux  Chaplain Resident  [phone]

## 2025-07-09 NOTE — PROGRESS NOTES
TRANSFER - IN REPORT:    Verbal report received from ISMAEL Francois on Flores Carlisle  being received from [facility] for ordered procedure      Report consisted of patient's Situation, Background, Assessment and   Recommendations(SBAR).     Information from the following report(s) Nurse Handoff Report was reviewed with the receiving nurse.    Opportunity for questions and clarification was provided.      Assessment completed upon patient's arrival to unit and care assumed.

## 2025-07-09 NOTE — PROGRESS NOTES
TRANSFER - OUT REPORT:    Verbal report given to ISMAEL Messina on Flores Carlisle  being transferred to [facility] for routine post-op       Report consisted of patient's Situation, Background, Assessment and   Recommendations(SBAR).     Information from the following report(s) Nurse Handoff Report and Surgery Report was reviewed with the receiving nurse.           Lines:   Peripheral IV 07/08/25 Left Antecubital (Active)   Site Assessment Clean, dry & intact 07/08/25 2002   Line Status Capped 07/08/25 2002   Line Care Connections checked and tightened 07/08/25 2002   Phlebitis Assessment No symptoms 07/08/25 2002   Infiltration Assessment 0 07/08/25 2002   Alcohol Cap Used Yes 07/08/25 2002   Dressing Status Clean, dry & intact 07/08/25 2002   Dressing Type Transparent 07/08/25 2002        Opportunity for questions and clarification was provided.      Patient transported with:  transporter

## 2025-07-09 NOTE — H&P
Physical Activity: Not on file   Stress: Not on file   Social Connections: Not on file   Intimate Partner Violence: Not on file   Housing Stability: Unknown (7/7/2025)    Housing Stability Vital Sign     Unable to Pay for Housing in the Last Year: Not on file     Number of Times Moved in the Last Year: Not on file     Homeless in the Last Year: No       FAMILY HISTORY  No family history on file.    CURRENT MEDICATIONS  Current Facility-Administered Medications   Medication Dose Route Frequency Provider Last Rate Last Admin    ceFAZolin (ANCEF) 2,000 mg in sterile water 20 mL IV syringe    PRN Lalo Swan MD   2,000 mg at 07/09/25 1210     No current outpatient medications on file.     Facility-Administered Medications Ordered in Other Encounters   Medication Dose Route Frequency Provider Last Rate Last Admin    sodium chloride flush 0.9 % injection 5-40 mL  5-40 mL IntraVENous 2 times per day Mau Horan APRN - CNP   10 mL at 07/09/25 0803    sodium chloride flush 0.9 % injection 5-40 mL  5-40 mL IntraVENous PRN Mau Horan APRN - CNP        0.9 % sodium chloride infusion   IntraVENous PRN Mau Horan APRN - CNP        potassium chloride (KLOR-CON) extended release tablet 40 mEq  40 mEq Oral PRN Mau Horan APRN - CNP        Or    potassium bicarb-citric acid (EFFER-K) effervescent tablet 40 mEq  40 mEq Oral PRN Mau Horan APRN - CNP        Or    potassium chloride 10 mEq/100 mL IVPB (Peripheral Line)  10 mEq IntraVENous PRN Mau Horan APRN - CNP        magnesium sulfate 2000 mg in 50 mL IVPB premix  2,000 mg IntraVENous PRN Mau Horan APRN - CNP        [Held by provider] enoxaparin (LOVENOX) injection 40 mg  40 mg SubCUTAneous Daily Mau Horan APRN - CNP        ondansetron (ZOFRAN-ODT) disintegrating tablet 4 mg  4 mg Oral Q8H PRN Mau Horan APRN - CNP        Or    ondansetron (ZOFRAN) injection 4 mg  4 mg

## 2025-07-10 VITALS
HEART RATE: 113 BPM | OXYGEN SATURATION: 97 % | DIASTOLIC BLOOD PRESSURE: 84 MMHG | TEMPERATURE: 97.9 F | RESPIRATION RATE: 12 BRPM | SYSTOLIC BLOOD PRESSURE: 124 MMHG | HEIGHT: 64 IN | BODY MASS INDEX: 34.21 KG/M2

## 2025-07-10 PROBLEM — S22.070K: Status: RESOLVED | Noted: 2025-07-09 | Resolved: 2025-07-10

## 2025-07-10 PROBLEM — S32.009G: Status: RESOLVED | Noted: 2025-07-08 | Resolved: 2025-07-10

## 2025-07-10 LAB
ANION GAP SERPL CALC-SCNC: 8 MMOL/L (ref 2–12)
BASOPHILS # BLD: 0.09 K/UL (ref 0–0.1)
BASOPHILS NFR BLD: 1 % (ref 0–1)
BUN SERPL-MCNC: 15 MG/DL (ref 6–20)
BUN/CREAT SERPL: 22 (ref 12–20)
CALCIUM SERPL-MCNC: 8.4 MG/DL (ref 8.5–10.1)
CHLORIDE SERPL-SCNC: 101 MMOL/L (ref 97–108)
CO2 SERPL-SCNC: 23 MMOL/L (ref 21–32)
CREAT SERPL-MCNC: 0.69 MG/DL (ref 0.55–1.02)
DIFFERENTIAL METHOD BLD: ABNORMAL
EOSINOPHIL # BLD: 0.17 K/UL (ref 0–0.4)
EOSINOPHIL NFR BLD: 1.9 % (ref 0–7)
ERYTHROCYTE [DISTWIDTH] IN BLOOD BY AUTOMATED COUNT: 13.9 % (ref 11.5–14.5)
GLUCOSE SERPL-MCNC: 88 MG/DL (ref 65–100)
HCT VFR BLD AUTO: 34.4 % (ref 35–47)
HGB BLD-MCNC: 11.1 G/DL (ref 11.5–16)
IMM GRANULOCYTES # BLD AUTO: 0.03 K/UL (ref 0–0.04)
IMM GRANULOCYTES NFR BLD AUTO: 0.3 % (ref 0–0.5)
LYMPHOCYTES # BLD: 1.83 K/UL (ref 0.8–3.5)
LYMPHOCYTES NFR BLD: 20 % (ref 12–49)
MCH RBC QN AUTO: 30.6 PG (ref 26–34)
MCHC RBC AUTO-ENTMCNC: 32.3 G/DL (ref 30–36.5)
MCV RBC AUTO: 94.8 FL (ref 80–99)
MONOCYTES # BLD: 0.78 K/UL (ref 0–1)
MONOCYTES NFR BLD: 8.5 % (ref 5–13)
NEUTS SEG # BLD: 6.26 K/UL (ref 1.8–8)
NEUTS SEG NFR BLD: 68.3 % (ref 32–75)
NRBC # BLD: 0 K/UL (ref 0–0.01)
NRBC BLD-RTO: 0 PER 100 WBC
PLATELET # BLD AUTO: 383 K/UL (ref 150–400)
PMV BLD AUTO: 8.8 FL (ref 8.9–12.9)
POTASSIUM SERPL-SCNC: 4.6 MMOL/L (ref 3.5–5.1)
RBC # BLD AUTO: 3.63 M/UL (ref 3.8–5.2)
SODIUM SERPL-SCNC: 132 MMOL/L (ref 136–145)
T4 FREE SERPL-MCNC: 1.6 NG/DL (ref 0.8–1.5)
TSH SERPL DL<=0.05 MIU/L-ACNC: 1.4 UIU/ML (ref 0.36–3.74)
WBC # BLD AUTO: 9.2 K/UL (ref 3.6–11)

## 2025-07-10 PROCEDURE — 93005 ELECTROCARDIOGRAM TRACING: CPT

## 2025-07-10 PROCEDURE — 97116 GAIT TRAINING THERAPY: CPT

## 2025-07-10 PROCEDURE — 6370000000 HC RX 637 (ALT 250 FOR IP): Performed by: NURSE PRACTITIONER

## 2025-07-10 PROCEDURE — 85025 COMPLETE CBC W/AUTO DIFF WBC: CPT

## 2025-07-10 PROCEDURE — 6360000002 HC RX W HCPCS: Performed by: NURSE PRACTITIONER

## 2025-07-10 PROCEDURE — 80048 BASIC METABOLIC PNL TOTAL CA: CPT

## 2025-07-10 RX ORDER — POLYETHYLENE GLYCOL 3350 17 G/17G
17 POWDER, FOR SOLUTION ORAL DAILY
Status: DISCONTINUED | OUTPATIENT
Start: 2025-07-10 | End: 2025-07-10 | Stop reason: HOSPADM

## 2025-07-10 RX ADMIN — POLYETHYLENE GLYCOL 3350 17 G: 17 POWDER, FOR SOLUTION ORAL at 08:05

## 2025-07-10 RX ADMIN — ACETAMINOPHEN 650 MG: 325 TABLET ORAL at 08:05

## 2025-07-10 RX ADMIN — ACETAMINOPHEN 650 MG: 325 TABLET ORAL at 15:26

## 2025-07-10 RX ADMIN — ENOXAPARIN SODIUM 40 MG: 100 INJECTION SUBCUTANEOUS at 09:57

## 2025-07-10 RX ADMIN — CYCLOBENZAPRINE 10 MG: 10 TABLET, FILM COATED ORAL at 13:29

## 2025-07-10 ASSESSMENT — PAIN DESCRIPTION - LOCATION: LOCATION: BACK

## 2025-07-10 ASSESSMENT — PAIN DESCRIPTION - DESCRIPTORS: DESCRIPTORS: ACHING

## 2025-07-10 ASSESSMENT — PAIN SCALES - GENERAL
PAINLEVEL_OUTOF10: 4
PAINLEVEL_OUTOF10: 6
PAINLEVEL_OUTOF10: 4

## 2025-07-10 NOTE — DISCHARGE SUMMARY
Discharge Summary       PATIENT ID: Flores Carlisle  MRN: [id number]   YOB: 1950    DATE OF ADMISSION: 7/8/2025  9:23 AM    DATE OF DISCHARGE: 7/10/25   PRIMARY CARE PROVIDER: Sierra Chaudhary MD     ATTENDING PHYSICIAN: DENISE MEEHAN MD  DISCHARGING PROVIDER: Brianna Mckenzie PA-C    To contact this individual call [phone] and ask the  to page.  If unavailable ask to be transferred the Adult Hospitalist Department.    CONSULTATIONS: IP CONSULT TO HOSPITALIST  IP CONSULT TO INTERVENTIONAL RADIOLOGY  IP CONSULT HOME HEALTH  IP CONSULT TO CASE MANAGEMENT    PROCEDURES/SURGERIES: * No surgery found *     ADMITTING DIAGNOSES & HOSPITAL COURSE:     Flores Carlisle is a 74 y.o. female with pmhx of HTN, HLD and RA who presents with continue pain in lower back. Patient was admitted last week for L1, L5 fracture and had kyphoplasty on 7/3. Patient reports that she had little or no relief from the procedure and returned to be re-evaluated. Reports pain is in lower/middle back with no radiation down either leg. Pain is managed if she does not move but once she starts moving it becomes very painful. 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 Denies fever chest pain shortness of breath, no n/v/d. Workup in ED showed CT lumbar spine with no new fractures. , K 4.6, BUN 17, Cr. 0.67 glucose 101, WBC 8.2, Hg 11.9, plt 430. UA negative with small amount of leuk esteraces. Hospitalist consulted for admission.       7/10  Seen and examined this am with nursing staff  She is comfortably laying in bed. Denies cp, sob, abd, n/v, lightheadedness/dizziness. Tolerating diet well.   Discussed her sinus tachycardia, pt states she has a hx of tachycardia and was seen by cardiology ~1 year ago for same. Per pt, her HR typically runs around 105. Chart review corroborates with HR ranging  dating back to 2022.   Does

## 2025-07-10 NOTE — CARE COORDINATION
Transition of Care Plan:    RUR: 12%   Prior Level of Functioning: Independent   Disposition: Home with Family Assistance and Home Health   Home [facility]   PT/OT/SN  [place]: TodayFollow up appointments: PCP  DME needed: RW; Delivered   Transportation at discharge: Family   IM/IMM Medicare/ letter given: Received   Caregiver Contact: Alexi Carlisle (Spouse) [phone]   Discharge Caregiver contacted prior to discharge? N  Care Conference needed? N      Per conversation with the pt; this cm met the pt at the bedside to discuss the [facility] plan. This cm informed the pt that therapy is recommending HH at discharge. The pt reported that she was aware. This cm provided the pt with education regarding the level of care and the services. This cm inquired if she had hx of HH. The pt reported that her  had hx of HH but could not recall the name of the agency. The pt reported that she did not have a preference in a HH provider at this  time. This cm informed her that a [facility] would be [facility] to [facility]. The pt reported that she was in agreement.         07/10/25 0951   Discharge Planning   Type of Home Care Services OT;PT;Nursing Services   Patient expects to be discharged to: House   Services At/After Discharge   Transition of Care Consult (CM Consult) Discharge Planning;Home Health   Internal Home Health No   Mode of Transport at Discharge Other (see comment)   Confirm Follow Up Transport Family   Condition of Participation: Discharge Planning   The Plan for Transition of Care is related to the following treatment goals: Home with Home Health   The Patient and/or Patient Representative was provided with a Choice of Provider? Patient   The Patient and/Or Patient Representative agree with the Discharge Plan? Yes   Freedom of Choice list was provided with basic dialogue that supports the patient's individualized plan of care/goals, treatment preferences, and shares the quality data associated

## 2025-07-10 NOTE — DISCHARGE INSTRUCTIONS
Discharge Instructions       PATIENT ID: Flores Carlisle  MRN: [id number]   YOB: 1950    DATE OF ADMISSION: 7/8/2025   DATE OF DISCHARGE: 7/10/2025    PRIMARY CARE PROVIDER: Sierra Chaudhary     ATTENDING PHYSICIAN: Rafael Cruz MD   DISCHARGING PROVIDER: Brianna Mckenzie PA-C    To contact this individual call [phone] and ask the  to page.   If unavailable ask to be transferred the Adult Hospitalist Department.    DISCHARGE DIAGNOSES      Lumbar back pain s/p kyphoplasty L1 and L 5  -CT of lumbar spine  1. Status post kyphoplasty at L1 and L4 with unchanged compression deformities  at those levels.   2. No new fractures identified.   3. Multilevel degenerative changes as described     -MRI of lumbar and thoracic spine shows again fracture at L1   -oxycodone and flexeril prn  -s/p kyphoplasty 7/9 with IR of T10  -PT/OT eval rec HH     HTN  -will substitute amlodipine for telmisartan while inpatient  -BLE swelling, consider as a possible side effect of telmisartan, might consider dc on amlodipine  - stable     Sinus tachycardia  - HR ranging 105-110 (120 when working with PT)  - EKG sinus tach   - Hx of same for which she saw cardiology last year   - TSH WNR  - Does not meet SIRS criteria/no signs or symptoms of infection  - BP and O2 stable  - F/u with pcp      Hyponatremia (resolving)  -Na 132  -evaluated by nephrology last admission,   -was thought to be hyponatremic due to SIADH  -hold ibuprofen as it may be contributing to hyponatremia    CONSULTATIONS: [unfilled]    PROCEDURES/SURGERIES: * No surgery found *      PENDING TEST RESULTS:   At the time of discharge the following test results are still pending: x    FOLLOW UP APPOINTMENTS:    Follow-up Information       Follow up With Specialties Details Why Contact Info    [facility]  Call As needed [address] [place] [address]    Sierra Chaudhary MD Internal Medicine Schedule an appointment as soon

## 2025-07-10 NOTE — PLAN OF CARE
Problem: Occupational Therapy - Adult  Goal: By Discharge: Performs self-care activities at highest level of function for planned discharge setting.  See evaluation for individualized goals.  Description: FUNCTIONAL STATUS PRIOR TO ADMISSION:  Patient was ambulatory using no DME and was independent for ADLs/IADLs.     Receives Help From: Family, Prior Level of Assist for ADLs: Independent, Prior Level of Assist for Homemaking: Needs assistance, Ambulation Assistance: Independent, Prior Level of Assist for Transfers: Independent, Active : Yes     HOME SUPPORT: Patient lived with her  and brother but didn't require assistance.    Occupational Therapy Goals:  Initiated 7/9/2025  1.  Patient will perform lower body dressing with Modified [place] within 7 day(s).  2.  Patient will perform bathing with Modified [place] within 7 day(s).  3.  Patient will perform grooming in standing with Modified [place] within 7 day(s).  4.  Patient will perform toilet transfers with Modified [place]  within 7 day(s).  5.  Patient will perform all aspects of toileting with Modified [place] within 7 day(s).  6.  Patient will participate in upper extremity therapeutic exercise/activities with [place] for 10 minutes within 7 day(s).    7.  Patient will utilize energy conservation techniques during functional activities with verbal cues within 7 day(s).  Outcome: Progressing   OCCUPATIONAL THERAPY EVALUATION    Patient: Flores Carlisle (74 y.o. 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 
  Problem: Physical Therapy - Adult  Goal: By Discharge: Performs mobility at highest level of function for planned discharge setting.  See evaluation for individualized goals.  Description: FUNCTIONAL STATUS PRIOR TO ADMISSION: Patient was independent with mobility and ADLs.  Lives with  and brother but did not require assistance.  Pt recently admitted to [facility] with L1 fracture and underwent kyphoplasty 7/3.  Since that admission, she has been staying on the first level of her home and sleeping in a recliner chair.  She has remained independent with mobility and ADLs but reports the significant pain has not improved.      Physical Therapy Goals  Initiated 7/9/2025  1.  Patient will move from supine to sit and sit to supine and scoot up and down in bed with independence within 7 day(s).    2.  Patient will perform sit to stand with independence within 7 day(s).  3.  Patient will transfer from bed to chair and chair to bed with independence using the least restrictive device within 7 day(s).  4.  Patient will ambulate with independence for 150 feet with the least restrictive device within 7 day(s).   5.  Patient will ascend/descend 5 stairs with no handrail(s) with contact guard assist within 7 day(s).    Outcome: Progressing    PHYSICAL THERAPY TREATMENT    Patient: Flores Carlisle (74 y.o. 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 
  Problem: Safety - Adult  Goal: Free from fall injury  Outcome: Progressing     Problem: Discharge Planning  Goal: Discharge to home or other facility with appropriate resources  Outcome: Progressing     Problem: Pain  Goal: Verbalizes/displays adequate comfort level or baseline comfort level  Outcome: Progressing     Problem: Physical Therapy - Adult  Goal: By Discharge: Performs mobility at highest level of function for planned discharge setting.  See evaluation for individualized goals.  Description: FUNCTIONAL STATUS PRIOR TO ADMISSION: Patient was independent with mobility and ADLs.  Lives with  and brother but did not require assistance.  Pt recently admitted to [facility] with L1 fracture and underwent kyphoplasty 7/3.  Since that admission, she has been staying on the first level of her home and sleeping in a recliner chair.  She has remained independent with mobility and ADLs but reports the significant pain has not improved.      Physical Therapy Goals  Initiated 7/9/2025  1.  Patient will move from supine to sit and sit to supine and scoot up and down in bed with independence within 7 day(s).    2.  Patient will perform sit to stand with independence within 7 day(s).  3.  Patient will transfer from bed to chair and chair to bed with independence using the least restrictive device within 7 day(s).  4.  Patient will ambulate with independence for 150 feet with the least restrictive device within 7 day(s).   5.  Patient will ascend/descend 5 stairs with no handrail(s) with contact guard assist within 7 day(s).    7/10/2025 1137 by Karlie Rawls, ESTUARDO  Outcome: Progressing     
APPENDECTOMY      BREAST BIOPSY Left     Surgical BX (Over 20yrs ago) - BENIGN     COLONOSCOPY N/A 1/25/2023    COLONOSCOPY performed by Olman Villegas MD at [facility] ENDOSCOPY    COLONOSCOPY N/A 2/13/2024    COLONOSCOPY DIAGNOSTIC performed by Adelita Zuleta MD at [facility] ENDOSCOPY    IR KYPHOPLASTY LUMBAR 1 VERTEBRAL BODY  7/3/2025    IR KYPHOPLASTY LUMBAR 1 VERTEBRAL BODY 7/3/2025 [facility] RAD ANGIO IR    OTHER SURGICAL HISTORY      Breast Biopsy       Home Situation:  Social/Functional History  Lives With: Spouse (and brother)  Type of Home: House  Home Layout: Two level (staying on first level since last admission)  Home Access: Stairs to enter with rails  Entrance Stairs - Number of Steps: 5  Bathroom Shower/Tub: None  Bathroom Equipment: None  Bathroom Accessibility: Accessible  Home Equipment: None  Receives Help From: Family  Prior Level of Assist for ADLs: Independent  Prior Level of Assist for Homemaking: Needs assistance  Homemaking Responsibilities: No (not at this time - with kyphoplasty recovery)  Prior Level of Assist for Transfers: Independent  Active : Yes  Mode of Transportation: Car  Occupation: Retired    Cognitive/Behavioral Status:  Orientation  Orientation Level: Oriented X4   Strength:    Strength: Generally decreased, functional    Tone & Sensation:   Tone: Normal  Sensation: Intact    Coordination:  Coordination: Generally decreased, functional    Range Of Motion:  AROM: Generally decreased, functional (d/t pain)       Functional Mobility:  Bed Mobility:  Bed Mobility Training  Rolling: Stand by assistance (VC for log roll technique)  Supine to Sit: Minimal assistance  Scooting: Stand by assistance  Transfers:  Transfer Training  Sit to Stand: Stand by assistance  Stand to Sit: Stand by assistance  Bed to Chair: Stand by assistance  Balance:   Balance  Sitting: Intact  Standing: Impaired  Standing - Static: Good  Standing - Dynamic: Fair;Unsupported  Ambulation/Gait

## 2025-07-11 LAB
EKG ATRIAL RATE: 118 BPM
EKG DIAGNOSIS: NORMAL
EKG P AXIS: 24 DEGREES
EKG P-R INTERVAL: 148 MS
EKG Q-T INTERVAL: 290 MS
EKG QRS DURATION: 80 MS
EKG QTC CALCULATION (BAZETT): 406 MS
EKG R AXIS: -39 DEGREES
EKG T AXIS: 3 DEGREES
EKG VENTRICULAR RATE: 118 BPM

## 2025-07-11 PROCEDURE — 93010 ELECTROCARDIOGRAM REPORT: CPT | Performed by: INTERNAL MEDICINE

## (undated) DEVICE — FCPS RAD JAW 4LC 240CM W/NDL -- BX/40